# Patient Record
Sex: MALE | Race: WHITE | Employment: OTHER | URBAN - METROPOLITAN AREA
[De-identification: names, ages, dates, MRNs, and addresses within clinical notes are randomized per-mention and may not be internally consistent; named-entity substitution may affect disease eponyms.]

---

## 2021-01-26 ENCOUNTER — OFFICE VISIT (OUTPATIENT)
Dept: PODIATRY | Age: 66
End: 2021-01-26
Payer: COMMERCIAL

## 2021-01-26 VITALS — WEIGHT: 204 LBS | HEIGHT: 72 IN | BODY MASS INDEX: 27.63 KG/M2

## 2021-01-26 DIAGNOSIS — R26.2 DIFFICULTY WALKING: ICD-10-CM

## 2021-01-26 DIAGNOSIS — R60.0 LOCALIZED EDEMA: ICD-10-CM

## 2021-01-26 DIAGNOSIS — M25.572 PAIN IN LEFT ANKLE AND JOINTS OF LEFT FOOT: ICD-10-CM

## 2021-01-26 DIAGNOSIS — M76.62 ACHILLES BURSITIS OF LEFT LOWER EXTREMITY: Primary | ICD-10-CM

## 2021-01-26 DIAGNOSIS — M89.8X7 RETROCALCANEAL EXOSTOSIS: ICD-10-CM

## 2021-01-26 DIAGNOSIS — M79.672 LEFT FOOT PAIN: Primary | ICD-10-CM

## 2021-01-26 PROCEDURE — 29580 STRAPPING UNNA BOOT: CPT | Performed by: PODIATRIST

## 2021-01-26 PROCEDURE — 99203 OFFICE O/P NEW LOW 30 MIN: CPT | Performed by: PODIATRIST

## 2021-01-26 RX ORDER — FENOFIBRATE 145 MG/1
TABLET, COATED ORAL
COMMUNITY
Start: 2020-12-30

## 2021-01-26 RX ORDER — ETODOLAC 400 MG/1
TABLET, FILM COATED ORAL
COMMUNITY
End: 2021-04-26

## 2021-01-26 RX ORDER — LEVOTHYROXINE SODIUM 0.03 MG/1
TABLET ORAL
COMMUNITY
Start: 2020-12-29

## 2021-01-26 RX ORDER — ESOMEPRAZOLE MAGNESIUM 20 MG/1
20 TABLET ORAL DAILY
COMMUNITY

## 2021-01-27 DIAGNOSIS — M79.672 LEFT FOOT PAIN: Primary | ICD-10-CM

## 2021-01-27 DIAGNOSIS — M89.8X7 PAIN IN METATARSUS OF LEFT FOOT: ICD-10-CM

## 2021-01-27 DIAGNOSIS — M25.572 LEFT ANKLE PAIN, UNSPECIFIED CHRONICITY: ICD-10-CM

## 2021-01-27 DIAGNOSIS — M25.572 PAIN IN LEFT ANKLE AND JOINTS OF LEFT FOOT: ICD-10-CM

## 2021-01-27 NOTE — PROGRESS NOTES
No past medical history on file. No family history on file. No past surgical history on file. Social History     Tobacco Use    Smoking status: Never Smoker    Smokeless tobacco: Never Used   Substance Use Topics    Alcohol use: Not on file    Drug use: Not on file           Diagnostic studies:    Xr Foot Left (min 3 Views)    Result Date: 1/27/2021  EXAMINATION: THREE XRAY VIEWS OF THE LEFT FOOT 1/26/2021 2:22 pm COMPARISON: None. HISTORY: ORDERING SYSTEM PROVIDED HISTORY: Left foot pain TECHNOLOGIST PROVIDED HISTORY: Standing unless patient unable to stand FINDINGS: There is mild degenerative remodeling and narrowing of the 1st MTP joint. A small spur projects from the plantar calcaneus. Osseous morphology and alignment is otherwise normal.    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 Calcaneal spur. Procedures: An unna boot  compressive wrap was applied to the left lower extremity. It's purpose is to  decrease  the amount of edema present, and to allow proper healing of the soft tissues. The patient was instructed to keep the unna boot clean, dry and intact until the next follow up visit. The patient was instructed to look for signs of redness, irritation, blistering and pain. If these or any other symptoms were to develop, they were advised to contact the office immediately for reevaluation. Exam:  VASCULAR: Pedal pulses palpable left foot. Capillary fill time brisk digits 1 through 5 left foot. Presence of hair growth noted left lower extremity. NEUROLOGICAL: Epicritic sensations intact left lower extremity  DERMATOLOGICAL: Localized edematous issues noted without ecchymosis posterior aspect left heel region. No skin abrasions or any signs of infection noted left lower extremity. MUSCULOSKELETAL: Tenderness noted to palpation distal Achilles insertional area left foot. No palpable defect noted along the course of the distal Achilles tendon.   Limited range of motion noted left ankle and subtalar joint secondary to edematous issues and patient guarding. Antalgic gait noted upon evaluation. Plan Per Assessment  Ana Rider was seen today for foot pain. Diagnoses and all orders for this visit:    Achilles bursitis of left lower extremity    Retrocalcaneal exostosis    Localized edema    Pain in left ankle and joints of left foot    Difficulty walking        1. New patient evaluation and management  2. We did review x-ray studies with patient in detail today left lower extremity. Due to the retrocalcaneal spurring and some soft tissue congestion noted into the cages triangle area left heel we did recommend MRI evaluation to assess integrity of the Achilles tendon. 3. We did recommend immobilization at this time as well. Patient was placed into a cam walker after a compression dressing was applied to the symptomatic left lower extremity. The patient was dispensed a/an pneumatic walker. It is medically necessary and within the standard of care for the patient's diagnosis. Its purpose is to immobilize the lower extremity, decrease edema, and promote healing of the affected area. The patient was instructed on is proper application and use. The patient was also instructed to watch for areas of running, irritation, blister formation, or any other signs of abnormal pressure. If this occurs, the patient is to contact the office immediately. The ABN was reviewed and signed by the patient prior to leaving this appointment. 4. Patient will be followed up at a later date to discuss MRI results and further treatment options available. We did recommend limiting his daily activities and elevating the left lower extremity throughout the day to reduce current symptoms. He was advised to call the office with any questions or concerns in the interim.       Seen By:    Maren Alicea DPM    Electronically signed by Maren Alicea DPM on 1/27/2021 at 9:40 AM      This note was created using voice recognition software. The note was reviewed however may contain grammatical errors.

## 2021-02-03 ENCOUNTER — OFFICE VISIT (OUTPATIENT)
Dept: PODIATRY | Age: 66
End: 2021-02-03
Payer: COMMERCIAL

## 2021-02-03 VITALS — BODY MASS INDEX: 27.63 KG/M2 | HEIGHT: 72 IN | WEIGHT: 204 LBS

## 2021-02-03 DIAGNOSIS — M25.572 PAIN IN LEFT ANKLE AND JOINTS OF LEFT FOOT: ICD-10-CM

## 2021-02-03 DIAGNOSIS — M76.62 ACHILLES BURSITIS OF LEFT LOWER EXTREMITY: Primary | ICD-10-CM

## 2021-02-03 DIAGNOSIS — R26.2 DIFFICULTY WALKING: ICD-10-CM

## 2021-02-03 DIAGNOSIS — M89.8X7 RETROCALCANEAL EXOSTOSIS: ICD-10-CM

## 2021-02-03 PROCEDURE — 99213 OFFICE O/P EST LOW 20 MIN: CPT | Performed by: PODIATRIST

## 2021-02-03 RX ORDER — INDOMETHACIN 50 MG/1
50 CAPSULE ORAL 2 TIMES DAILY WITH MEALS
Qty: 20 CAPSULE | Refills: 3 | Status: SHIPPED
Start: 2021-02-03 | End: 2021-04-26

## 2021-02-03 NOTE — PROGRESS NOTES
Patient here for follow up on the unna wrap and walking boot. Patient states that the wrap did not help at all. Patient states that he still has chronic pain in the left foot whether he is standing or sitting.            Electronically signed by Ezequiel Ray MA on 2/3/2021 at 3:43 PM

## 2021-02-04 NOTE — PROGRESS NOTES
21     Gavi Smith    : 1955   Sex: male    Age: 63 y.o. Patient's PCP/Provider is:  Neelima Moura MD    Subjective:  Patient is seen today for follow-up regarding continued pain and swelling posterior left ankle/foot. Patient stated the issues has continued to cause symptoms even with compression applied and immobilization with camwalker. Patient is set up for his MRI next week for further evaluation. No other additional issues noted. Chief Complaint   Patient presents with    Foot Pain     left       ROS:  Const: Positives and pertinent negatives as per HPI. Musculo: Denies symptoms other than stated above. Neuro: Denies symptoms other than stated above. Skin: Denies symptoms other than stated above. Current Medications:    Current Outpatient Medications:     indomethacin (INDOCIN) 50 MG capsule, Take 1 capsule by mouth 2 times daily (with meals), Disp: 20 capsule, Rfl: 3    fenofibrate (TRICOR) 145 MG tablet, TAKE 1 TABLET BY MOUTH ONCE DAILY AS DIRECTED FOR 30 DAYS, Disp: , Rfl:     levothyroxine (SYNTHROID) 25 MCG tablet, TAKE 1 TABLET BY MOUTH ONCE DAILY AS DIRECTED FOR 30 DAYS, Disp: , Rfl:     etodolac (LODINE) 400 MG tablet, etodolac 400 mg tablet  TAKE 1 TABLET BY MOUTH TWICE DAILY WITH FOOD, Disp: , Rfl:     Esomeprazole Magnesium (NEXIUM 24HR) 20 MG TBEC, Take by mouth daily, Disp: , Rfl:     Allergies:  No Known Allergies    Vitals:    21 1543   Weight: 204 lb (92.5 kg)   Height: 6' (1.829 m)       Exam:  NVS unchanged. Tenderness noted to the posterior left Achilles tendon insertion area. Thickening noted in the area without palpable defect. Edema noted in the area without ecchymosis noted. No skin abrasions or signs of infection noted. Diagnostic Studies:     Xr Foot Left (min 3 Views)    Result Date: 2021  EXAMINATION: THREE XRAY VIEWS OF THE LEFT FOOT 2021 2:22 pm COMPARISON: None.  HISTORY: ORDERING SYSTEM PROVIDED HISTORY: Left foot pain TECHNOLOGIST PROVIDED HISTORY: Standing unless patient unable to stand FINDINGS: There is mild degenerative remodeling and narrowing of the 1st MTP joint. A small spur projects from the plantar calcaneus. Osseous morphology and alignment is otherwise normal.    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 Calcaneal spur. Procedures:    None    Plan Per Assessment  Reza Silver was seen today for foot pain. Diagnoses and all orders for this visit:    Achilles bursitis of left lower extremity  -     indomethacin (INDOCIN) 50 MG capsule; Take 1 capsule by mouth 2 times daily (with meals)    Retrocalcaneal exostosis    Pain in left ankle and joints of left foot  -     indomethacin (INDOCIN) 50 MG capsule; Take 1 capsule by mouth 2 times daily (with meals)    Difficulty walking      1. Evaluation and management  2. Discussed continued use of the camwalker for immobilization purposes and care. Prescription medication Indocin given with exact instructions on usage. I discussed the potential side effects that the patient may experience with the medication and the need to call if they experience any. Patient was to discontinue all other NSAID's at this time. Patient will be followed up once MRI is performed and reviewed. We will discuss additional treatment options available at that time. He was advised to call the office with any questions or concerns in the interim. Seen By:    Woody Mckeon DPM    Electronically signed by Woody Mckeon DPM on 2/4/2021 at 12:33 PM    This note was created using voice recognition software. The note was reviewed however may contain grammatical errors.

## 2021-02-08 ENCOUNTER — HOSPITAL ENCOUNTER (OUTPATIENT)
Dept: MRI IMAGING | Age: 66
Discharge: HOME | End: 2021-02-10
Payer: COMMERCIAL

## 2021-02-08 DIAGNOSIS — M25.572 LEFT ANKLE PAIN, UNSPECIFIED CHRONICITY: ICD-10-CM

## 2021-02-08 DIAGNOSIS — M89.8X7 PAIN IN METATARSUS OF LEFT FOOT: ICD-10-CM

## 2021-02-08 DIAGNOSIS — M79.672 LEFT FOOT PAIN: ICD-10-CM

## 2021-02-08 DIAGNOSIS — M25.572 PAIN IN LEFT ANKLE AND JOINTS OF LEFT FOOT: ICD-10-CM

## 2021-02-08 PROCEDURE — 73721 MRI JNT OF LWR EXTRE W/O DYE: CPT

## 2021-02-09 ENCOUNTER — TELEPHONE (OUTPATIENT)
Dept: PODIATRY | Age: 66
End: 2021-02-09

## 2021-02-09 NOTE — TELEPHONE ENCOUNTER
02/09/2021 received a fax from [facility] stating that the MRI Ankle does not support the requirements of medical necessity guidelines for this procedure. Patient had his MRI completed yesterday. Spoke with Manager Tamica Dai in [place] about this, and he advises to wait until the patient possibly receives a bill on the MRI.  If patient does receive a bill, then will have [facility]) retro [address] [place] [address] Dept was to Favian bishop.           Electronically signed by Verna Sepulveda MA on 2/9/2021 at 7:59 AM

## 2021-02-16 ENCOUNTER — OFFICE VISIT (OUTPATIENT)
Dept: PODIATRY | Age: 66
End: 2021-02-16
Payer: COMMERCIAL

## 2021-02-16 VITALS — BODY MASS INDEX: 27.63 KG/M2 | WEIGHT: 204 LBS | HEIGHT: 72 IN

## 2021-02-16 DIAGNOSIS — M89.8X7 RETROCALCANEAL EXOSTOSIS: ICD-10-CM

## 2021-02-16 DIAGNOSIS — M76.62 ACHILLES BURSITIS OF LEFT LOWER EXTREMITY: Primary | ICD-10-CM

## 2021-02-16 DIAGNOSIS — M25.572 PAIN IN LEFT ANKLE AND JOINTS OF LEFT FOOT: ICD-10-CM

## 2021-02-16 DIAGNOSIS — R26.2 DIFFICULTY WALKING: ICD-10-CM

## 2021-02-16 PROCEDURE — 99213 OFFICE O/P EST LOW 20 MIN: CPT | Performed by: PODIATRIST

## 2021-02-16 NOTE — PROGRESS NOTES
21     Michelle Villalobos    : 1955   Sex: male    Age: 63 y.o. Patient's PCP/Provider is:  Alissa Sol MD    Subjective:  Patient is seen today for follow-up regarding continued care Achilles tendonitis left lower extremity. Patient is still wearing his camwalker, and presents today to discuss MRI results. Patient is stable but still having some symptoms posterior left heel. No other additional issues noted. Chief Complaint   Patient presents with    Other     MRI results       ROS:  Const: Positives and pertinent negatives as per HPI. Musculo: Denies symptoms other than stated above. Neuro: Denies symptoms other than stated above. Skin: Denies symptoms other than stated above. Current Medications:    Current Outpatient Medications:     indomethacin (INDOCIN) 50 MG capsule, Take 1 capsule by mouth 2 times daily (with meals), Disp: 20 capsule, Rfl: 3    fenofibrate (TRICOR) 145 MG tablet, TAKE 1 TABLET BY MOUTH ONCE DAILY AS DIRECTED FOR 30 DAYS, Disp: , Rfl:     levothyroxine (SYNTHROID) 25 MCG tablet, TAKE 1 TABLET BY MOUTH ONCE DAILY AS DIRECTED FOR 30 DAYS, Disp: , Rfl:     etodolac (LODINE) 400 MG tablet, etodolac 400 mg tablet  TAKE 1 TABLET BY MOUTH TWICE DAILY WITH FOOD, Disp: , Rfl:     Esomeprazole Magnesium (NEXIUM 24HR) 20 MG TBEC, Take by mouth daily, Disp: , Rfl:     Allergies:  No Known Allergies    Vitals:    21 0835   Weight: 204 lb (92.5 kg)   Height: 6' (1.829 m)       Exam:  NVS unchanged. Tenderness noted to palpation posterior left heel along distal course Achilles. Mild edema without ecchymosis noted. No skin abrasions or signs of infection noted. Diagnostic Studies:       Xr Foot Left (min 3 Views)    Result Date: 2021  EXAMINATION: THREE XRAY VIEWS OF THE LEFT FOOT 2021 2:22 pm COMPARISON: None.  HISTORY: ORDERING SYSTEM PROVIDED HISTORY: Left foot pain TECHNOLOGIST PROVIDED HISTORY: Standing unless patient unable to stand FINDINGS: There is mild degenerative remodeling and narrowing of the 1st MTP joint. A small spur projects from the plantar calcaneus. Osseous morphology and alignment is otherwise normal.    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 Calcaneal spur. Mri Ankle Left Wo Contrast    Result Date: 2/9/2021  EXAMINATION: MRI OF THE LEFT ANKLE WITHOUT CONTRAST, 2/8/2021 8:35 am TECHNIQUE: Multiplanar multisequence MRI of the left ankle was performed without the administration of intravenous contrast. COMPARISON: None. Correlation is made to foot radiograph dated January 26, 2021 HISTORY: ORDERING SYSTEM PROVIDED HISTORY: Left foot pain FINDINGS: SYNDESMOTIC LIGAMENTS: The anterior inferior tibiofibular ligament, posterior inferior tibiofibular ligament, and imaged portion of the inferior intraosseous membrane are intact. LATERAL COLLATERAL LIGAMENT COMPLEX: The anterior talofibular ligament, calcaneofibular ligament, and posterior talofibular ligaments are intact. DELTOID LIGAMENT COMPLEX: Deep and superficial fibers of the deltoid ligaments are intact. SINUS TARSI AND SPRING LIGAMENT: No mass or edema in the sinus tarsus. The spring ligament is diminutive but grossly intact. MEDIAL TENDONS: The posterior tibial, flexor digitorum longus, and flexor hallucis longus tendons are intact. LATERAL TENDONS: The peroneus longus and brevis tendons are intact and follow a normal anatomic course. The superior peroneal retinaculum is intact. ANTERIOR TENDONS: The anterior tibial, extensor hallucis longus, and extensor digitorum longus tendons are intact. ACHILLES TENDON: Mild signal heterogeneity at the distal portion of the Achilles tendon, at the Achilles attachment. PLANTAR FASCIA: The medial and lateral bundles of the plantar fascia are normal in morphology and signal.  There is no evidence of acute plantar fasciitis or tear. No evidence of plantar fascia nodules. TARSAL TUNNEL: No mass or edema in the tarsal tunnel.  BONE MARROW: Diffuse bone marrow edema in the dorsal posterior aspect of the calcaneus. No visible cortical break. There is a suspected subchondral cyst in the navicular bone, at the calcaneal navicular articulation. No other convincing bone marrow signal abnormality. Increased signal at the base of the 4th and 5th metatarsal on the axial T2 fat sat sequences suspected to be from inhomogeneous fat saturation given increased signal within the adjacent subcutaneous fat. JOINT SPACES: Mild subtalar degenerative changes. Mild tibiotalar degenerative changes. No significant joint effusion. 1. Bone marrow edema in the dorsal posterior aspect of the calcaneus, likely trabecular microfractures/bone contusion. 2. Mild tendinopathy of the Achilles tendon at the calcaneal attachment. Procedures:    None    Plan Per Assessment  Kenneth Vargas was seen today for other. Diagnoses and all orders for this visit:    Achilles bursitis of left lower extremity  -     [facility], [facility]    Retrocalcaneal exostosis  -     [facility], [facility]    Pain in left ankle and joints of left foot  -     [facility], [facility]    Difficulty walking  -     [address] [place] [address]      1. Evaluation and management  2. Discussed MRI findings with the patient. No need for surgical intervention. 3. Discussed proceeding with PT for continued care here at our facility. 4. Patient will be followed up in one month or sooner if needed. He was to call with any questions in the interim. Seen By:    Nandini Amato DPM    Electronically signed by Nandini Amato DPM on 2/16/2021 at 12:09 PM    This note was created using voice recognition software. The note was reviewed however may contain grammatical errors.

## 2021-02-16 NOTE — PROGRESS NOTES
Patient is here to discuss MRI results. Patient is still having pain and the antiinflammatory is not really helping.     Electronically signed by Ana Raphael LPN on 7/10/1697 at 0:09 AM

## 2021-02-17 ENCOUNTER — TELEPHONE (OUTPATIENT)
Dept: PODIATRY | Age: 66
End: 2021-02-17

## 2021-02-17 DIAGNOSIS — M76.62 ACHILLES BURSITIS OF LEFT LOWER EXTREMITY: Primary | ICD-10-CM

## 2021-02-17 DIAGNOSIS — M25.572 PAIN IN LEFT ANKLE AND JOINTS OF LEFT FOOT: ICD-10-CM

## 2021-02-17 RX ORDER — METHYLPREDNISOLONE 4 MG/1
TABLET ORAL
Qty: 21 KIT | Refills: 3 | Status: SHIPPED
Start: 2021-02-17 | End: 2021-04-26

## 2021-02-22 ENCOUNTER — EVALUATION (OUTPATIENT)
Dept: PHYSICAL THERAPY | Age: 66
End: 2021-02-22
Payer: COMMERCIAL

## 2021-02-22 DIAGNOSIS — M76.62 ACHILLES BURSITIS OF LEFT LOWER EXTREMITY: Primary | ICD-10-CM

## 2021-02-22 PROCEDURE — 97161 PT EVAL LOW COMPLEX 20 MIN: CPT | Performed by: PHYSICAL THERAPIST

## 2021-02-22 PROCEDURE — 97110 THERAPEUTIC EXERCISES: CPT | Performed by: PHYSICAL THERAPIST

## 2021-02-22 NOTE — PROGRESS NOTES
Daily Treatment Note    Date: 2021  Patient Name: Nena Roberts  : 1955   MRN: [id number]  [facility]: 8 weeks ago   DOSx: None  Referring Provider: Kalli Zepeda, DPM  [address] [facility]     Medical Diagnosis:      Diagnosis Orders   1. Achilles bursitis of left lower extremity         Outcome Measure: LEFS: 65% impaired on eval.    S: See eval  O:   Time 4171-4003     Visit 1 Repeat outcome measure at mid point and end. Pain 8/10     ROM See eval     Modalities      Ultrasound NEXT  MO   Manual            Stretch      Self Stretch - Plantar/Dorsi/ 5 reps x 1 set with 10s holds  TE   Wall push 5 reps x 1 set with 5-10s holds  TE   Soleus stretch on wall 5 reps x 1 set with 5-10s holds  TE   Plantar fascia stretch on wall   TE   Gastroc stretch with foot on wall 5 reps x 1 set with 5-10s holds  TE         Exercise      Nustep NEXT  TE   Dorsiflexion  5 reps x 1 set  TE   Plantarflexion  5 reps x 1 set  TE   Inversion/Eversion  5 reps x 1 set  TE   Ankle Alphabet 5 reps x 1 set  TE   Ankle Circles             Ankle Rocker NEXT     BAPS board (seated) NEXT     Marching on Foam      Toe Raises on Foam   TA   Reach and Touch        SLS      Squats      Lunges      Eccentric Heel Tap   TA   [place] Split Squat      Single Leg [place] Dead Lift            Leg Press 2 legs   TA   Leg Press 1 leg   TA   Stairs - FWB   TA   Stairs - LAT   TA   Stairs - BWD      Calf Raises      Seated Calf Raises on Knee Ext Machine            Marching Gait      Side Stepping      Karaoke       Hopping            A:  Tolerated well. Above added to written HEP. Discussed anatomy, physiology, body mechanics, principles of loading, and progressive loading/activity.   P: Continue with rehab plan  Wally Rangel PT    Treatment Charges: Mins Units   Initial Evaluation 30 1   Re-Evaluation     Ther Exercise         TE 15 1   Manual Therapy     MT     Ther Activities        TA     Gait Training          GT     Neuro Re-education NR     Modalities     Non-Billable Service Time     Other     Total Time/Units 45 2

## 2021-02-22 NOTE — PROGRESS NOTES
[facility] OUTPATIENT REHABILITATION  PHYSICAL THERAPY INITIAL EVALUATION         Date:  2021   Patient: Quinn Springer  : 1955  MRN: [id number]  Referring Provider: Kavya Keys DPM  [address] Diagnosis:      Diagnosis Orders   1. Achilles bursitis of left lower extremity         SUBJECTIVE:     Onset date: 8 weeks     Onset: Gradual Onset    Mechanism of Injury: Pt stated that he progressively felt symptoms in his achilles without a specific injury. Previous PT: none    Medical Management for Current Problem: OTC meds     Chief complaint: pain, decreased motion, decreased mobility and weakness    Behavior: condition is staying the same    Pain: intermittent, most of the time  Current: 8/10     Best: 0/10     Worst:10/10    Symptom Type/Quality: aching, stabbing, burning , throbbing  Location[de-identified] Ankle: noted above  posterior side, achilles    Aggravated by: walking, standing, stairs    Relieved by: reduced weightbearing    Imaging results: Xr Foot Left (min 3 Views)    Result Date: 2021  EXAMINATION: THREE XRAY VIEWS OF THE LEFT FOOT 2021 2:22 pm COMPARISON: None. HISTORY: ORDERING SYSTEM PROVIDED HISTORY: Left foot pain TECHNOLOGIST PROVIDED HISTORY: Standing unless patient unable to stand FINDINGS: There is mild degenerative remodeling and narrowing of the 1st MTP joint. A small spur projects from the plantar calcaneus. Osseous morphology and alignment is otherwise normal.    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 Calcaneal spur. Mri Ankle Left Wo Contrast    Result Date: 2021  EXAMINATION: MRI OF THE LEFT ANKLE WITHOUT CONTRAST, 2021 8:35 am TECHNIQUE: Multiplanar multisequence MRI of the left ankle was performed without the administration of intravenous contrast. COMPARISON: None.   Correlation is made to foot radiograph dated 2021 HISTORY: ORDERING SYSTEM PROVIDED HISTORY: Left foot pain FINDINGS: SYNDESMOTIC LIGAMENTS: The anterior inferior tibiofibular ligament, posterior inferior tibiofibular ligament, and imaged portion of the inferior intraosseous membrane are intact. LATERAL COLLATERAL LIGAMENT COMPLEX: The anterior talofibular ligament, calcaneofibular ligament, and posterior talofibular ligaments are intact. DELTOID LIGAMENT COMPLEX: Deep and superficial fibers of the deltoid ligaments are intact. SINUS TARSI AND SPRING LIGAMENT: No mass or edema in the sinus tarsus. The spring ligament is diminutive but grossly intact. MEDIAL TENDONS: The posterior tibial, flexor digitorum longus, and flexor hallucis longus tendons are intact. LATERAL TENDONS: The peroneus longus and brevis tendons are intact and follow a normal anatomic course. The superior peroneal retinaculum is intact. ANTERIOR TENDONS: The anterior tibial, extensor hallucis longus, and extensor digitorum longus tendons are intact. ACHILLES TENDON: Mild signal heterogeneity at the distal portion of the Achilles tendon, at the Achilles attachment. PLANTAR FASCIA: The medial and lateral bundles of the plantar fascia are normal in morphology and signal.  There is no evidence of acute plantar fasciitis or tear. No evidence of plantar fascia nodules. TARSAL TUNNEL: No mass or edema in the tarsal tunnel. BONE MARROW: Diffuse bone marrow edema in the dorsal posterior aspect of the calcaneus. No visible cortical break. There is a suspected subchondral cyst in the navicular bone, at the calcaneal navicular articulation. No other convincing bone marrow signal abnormality. Increased signal at the base of the 4th and 5th metatarsal on the axial T2 fat sat sequences suspected to be from inhomogeneous fat saturation given increased signal within the adjacent subcutaneous fat. JOINT SPACES: Mild subtalar degenerative changes. Mild tibiotalar degenerative changes. No significant joint effusion.     1. Bone marrow edema in the dorsal posterior aspect of the calcaneus, likely trabecular microfractures/bone contusion. 2. Mild tendinopathy of the Achilles tendon at the calcaneal attachment. Past Medical History  No past medical history on file. No past surgical history on file. Medications:   Current Outpatient Medications   Medication Sig Dispense Refill    methylPREDNISolone (MEDROL DOSEPACK) 4 MG tablet Take by mouth as directed. 21 kit 3    indomethacin (INDOCIN) 50 MG capsule Take 1 capsule by mouth 2 times daily (with meals) 20 capsule 3    fenofibrate (TRICOR) 145 MG tablet TAKE 1 TABLET BY MOUTH ONCE DAILY AS DIRECTED FOR 30 DAYS      levothyroxine (SYNTHROID) 25 MCG tablet TAKE 1 TABLET BY MOUTH ONCE DAILY AS DIRECTED FOR 30 DAYS      etodolac (LODINE) 400 MG tablet etodolac 400 mg tablet   TAKE 1 TABLET BY MOUTH TWICE DAILY WITH FOOD      Esomeprazole Magnesium (NEXIUM 24HR) 20 MG TBEC Take by mouth daily       No current facility-administered medications for this visit. Occupation: Hairdresser. Physical demands include: standing, stairs. Status: full time    Exercise regimen: weight training , cardio    Hobbies: yard work, working around [employer], walking distances, standing without pain. Patient Goals: pain relief, return to prior activity, get back to normal    Precautions/Contraindications: none    OBJECTIVE:     Observations: well nourished male    Inspection: supination     Edema: moderate medial, lateral, posterior    Gait: altered with short step length, width, height    Joint/Motion:    Ankle:  Right:   AROM: WNL° Dorsiflexion,  WNL° Plantarflexion, WNL° Inversion, WNL° Eversion   PROM: WNL° Dorsiflexion,  WNL° Plantarflexion, WNL° Inversion, WNL° Eversion   Left:   AROM: 15° Dorsiflexion,  45° Plantarflexion, 20° Inversion, 10° Eversion  PROM: 17° Dorsiflexion,  47° Plantarflexion, 22° Inversion, 11° Eversion    Strength:     Ankle:  Right: Dorsiflexion 5/5, Plantarflexion 5/5, Inversion 5/5, Eversion 5/5  Left: Dorsiflexion 5/5, Plantarflexion 4/5, Inversion 5/5, Eversion 5/5    Palpation: Tender to palpation achilles, especially around bone spur. Special Tests/Functional Screens:   [] Anterior Drawer []+ / [] -  [] Eversion Stress: []+ / [] -  [] Felder Test []+ / [] -     [] Tib-Fib Compression Test []+ / [] -    [] Inversion Stress []+ / [] -     [] Squeeze Test []+ / [] -   [] Manjit's Sign []+ / [] -   [] Other: []+ / []      Special test comments:       ASSESSMENT     Outcome Measure:   Lower Extremity Functional Scale (LEFS) 65% impairment    Problems:    Pain reported 10/10   ROM decreased   Strength decreased 4/5 L plantarflexion   Decreased functional ability with walking, stairs, standing, work, ADLs, use of left lower extremity, lifting     [x] There are no barriers affecting plan of care or recovery    [] Barriers to this patient's plan of care or recovery include. Domestic Concerns:  [x] No  [] Yes:    Short Term goals (2-3 weeks)   Decrease reported pain to 6/10   Increase ROM to WNL   Increase Strength to 4+/5 L plantarflexion    Able to perform/complete the following functions/tasks: Perform ADLs, hobbies, job duties with less pain.  Lower Extremity Functional Scale (LEFS) 50% impairment    Long Term goals (4-6 weeks)   Decrease reported pain to 0-4/10   Increase ROM to WNL   Increase strength to 5/5 L plantarflexion   Able to complete the following functions/tasks: Perform ADLs, hobbies, job duties with no/minimal pain.     LEFS 0-35% impairment   Independent with home exercise program (HEP)    Rehab Potential: [x] Good  [] Fair  [] Poor    PLAN       Treatment Plan:   [x] Therapeutic Exercise  [x] Therapeutic Activity  [x] Neuromuscular Re-education   [x] Gait Training  [x] Balance Training  [] Aerobic conditioning  [x] Manual Therapy  [x] Massage/Fascial release   [] Work/Sport specific activities    [] Pain Neuroscience [x] Cold/hotpack  [x] Vasocompression  [] Electrical Stimulation  [] Lumbar/Cervical Traction  [x] Ultrasound   [] Iontophoresis: 4 mg/mL Dexamethasone Sodium Phosphate 40-80 mAmin        [x] Instruction in HEP      []  Medication allergies reviewed for use of Dexamethasone Sodium Phosphate 4mg/ml  with iontophoresis treatments. Patient is not allergic. The following CPT codes are likely to be used in the care of this patient: 13070 PT Evaluation: Low Complexity , 84528 PT Re-Evaluation , 55254 Therapeutic Exercise , 27549 Neuromuscular Re-Education , 55060 Therapeutic Activities , 00521 Manual Therapy , 76793 Gait Training , 93029 Vasopneumatic Device ,  Electrical Stimulation      Suggested Professional Referral: [x] No  [] Yes:     Patient Education:  [x] Plans/Goals, Risks/Benefits discussed  [x] Home exercise program  Method of Education: [x] Verbal  [x] Demo  [x] Written  Comprehension of Education:  [x] Verbalizes understanding. [x] Demonstrates understanding. [] Needs Review. [] Demonstrates/verbalizes understanding of HEP/Ed previously given. Frequency: 1-2 days per week for 4-6 weeks    Patient understands diagnosis/prognosis and consents to treatment, plan and goals: [x] Yes    [] No     Thank you for the opportunity to work with your patient. If you have questions or comments, please contact me at [phone]; fax: [phone]. Electronically signed by: Micah Branch PT         Please sign Physician's Certification and return to: [address] [place] [address]  Dept: [phone]  Dept Fax: [phone] Certification / Comments     Frequency/Duration 1-2 days per week for 4-6 weeks. Certification period from 2/22/2021  to 5/21/2021. I have reviewed the Plan of Care established for skilled therapy services and certify that the services are required and that they will be provided while the patient is under my care.     [facility] Comments/Revisions:               Physician's Printed Name:                                           [de-identified] Signature:                                                               Date:

## 2021-03-03 ENCOUNTER — TREATMENT (OUTPATIENT)
Dept: PHYSICAL THERAPY | Age: 66
End: 2021-03-03
Payer: COMMERCIAL

## 2021-03-03 DIAGNOSIS — M76.62 ACHILLES BURSITIS OF LEFT LOWER EXTREMITY: ICD-10-CM

## 2021-03-03 PROCEDURE — 97110 THERAPEUTIC EXERCISES: CPT | Performed by: PHYSICAL THERAPIST

## 2021-03-03 NOTE — PROGRESS NOTES
Daily Treatment Note    Date: 3/3/2021  Patient Name: Valeri Mathew  : 1955   MRN: [id number]  DOInjury: 8 weeks ago   DOSx: None  Referring Provider: No referring provider defined for this encounter. Medical Diagnosis:     Achilles bursitis of left lower extremity     Outcome Measure: LEFS: 65% impaired on eval.    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 O:   Time 845-935     Visit 2 Repeat outcome measure at mid point and end. Pain 5/10     ROM See eval     Modalities      Ultrasound 10 mins  MO   Manual            Stretch      Self Stretch - Plantar/Dorsi/ 5 reps x 1 set with 10s holds  TE   Wall push 5 reps x 1 set with 5-10s holds  TE   Soleus stretch on wall 5 reps x 1 set with 5-10s holds  TE   Plantar fascia stretch on wall   TE   Gastroc stretch with foot on wall 5 reps x 1 set with 5-10s holds  TE         Exercise      Nustep NEXT  TE   Dorsiflexion  15 reps x 1 set RED BAND  TE   Plantarflexion  15 reps x 1 set RED BAND  TE   Inversion/Eversion  15 reps x 1 set RED BAND  TE   Ankle Alphabet 15 reps x 1 set RED BAND  TE   Ankle Circles             Ankle Rocker 20 reps x 1 set     BAPS board (seated) L3 20 reps x 1 set fwd/bwd, side to side, cwise, ccwise     Marching on Foam      Toe Raises on Foam   TA   Reach and Touch        SLS      Squats      Lunges      Eccentric Heel Tap   TA   [place] Split Squat      Single Leg Bermudian Dead Lift            Leg Press 2 legs   TA   Leg Press 1 leg   TA   Stairs - FWB   TA   Stairs - LAT   TA   Stairs - BWD      Calf Raises      Seated Calf Raises on Knee Ext Machine            Marching Gait      Side Stepping      Karaoke       Hopping            A:  Tolerated well. Above added to written HEP. Discussed anatomy, physiology, body mechanics, principles of loading, and progressive loading/activity.  Pt tolerated exercises well without increase in symptoms.    P: Continue with rehab plan  Mónica Mathews, PT    Treatment Charges: Mins Units   Initial Evaluation     Re-Evaluation     Ther Exercise         TE 40 3   Manual Therapy     MT     Ther Activities        TA     Gait Training          GT     Neuro Re-education NR     Modalities 10    Non-Billable Service Time     Other     Total Time/Units 50 3

## 2021-03-09 ENCOUNTER — TREATMENT (OUTPATIENT)
Dept: PHYSICAL THERAPY | Age: 66
End: 2021-03-09
Payer: COMMERCIAL

## 2021-03-09 DIAGNOSIS — M76.62 ACHILLES BURSITIS OF LEFT LOWER EXTREMITY: Primary | ICD-10-CM

## 2021-03-09 PROCEDURE — 97110 THERAPEUTIC EXERCISES: CPT | Performed by: PHYSICAL THERAPIST

## 2021-03-09 NOTE — PROGRESS NOTES
Daily Treatment Note    Date: 3/9/2021  Patient Name: Nani Peña  : 1955   MRN: [id number]  DOInjury: 8 weeks ago   DOSx: None  Referring Provider: No referring provider defined for this encounter. Medical Diagnosis:     Achilles bursitis of left lower extremity     Outcome Measure: LEFS: 65% impaired on eval.    S: Pt stated that he still has high level pain throughout the day and has not had any relief yet with therapy or his HEP.   O:   Time      Visit 3 Repeat outcome measure at mid point and end. Pain 8/10     ROM See eval     Modalities      Ultrasound 10 mins  MO   Manual            Stretch      Self Stretch - Plantar/Dorsi/ 5 reps x 1 set with 10s holds  TE   Wall push 5 reps x 1 set with 5-10s holds  TE   Soleus stretch on wall 5 reps x 1 set with 5-10s holds  TE   Plantar fascia stretch on wall   TE   Gastroc stretch with foot on wall 5 reps x 1 set with 5-10s holds  TE         Exercise      Nustep NEXT  TE   Dorsiflexion  15 reps x 1 set RED BAND  TE   Plantarflexion  15 reps x 1 set RED BAND  TE   Inversion/Eversion  15 reps x 1 set RED BAND  TE   Ankle Alphabet 15 reps x 1 set RED BAND  TE   Ankle Circles       Toe Raises 15 reps x 1 set      Ankle Rocker 20 reps x 2 sets     BAPS board (seated) L3 20 reps x 1 set fwd/bwd, side to side, cwise, ccwise NEXT STANDING WITH 2 FEET    Marching on Foam      Toe Raises on Foam   TA   Reach and Touch        SLS      Squats      Lunges      Eccentric Heel Tap   TA   [place] Split Squat      Single Leg Upper sorbian Dead Lift            Leg Press 2 legs   TA   Leg Press 1 leg   TA   Stairs - FWB   TA   Stairs - LAT   TA   Stairs - BWD      Calf Raises      Seated Calf Raises on Knee Ext Machine            Marching Gait      Side Stepping      Karaoke       Hopping            A:  Tolerated well. Above added to written HEP. Discussed anatomy, physiology, body mechanics, principles of loading, and progressive loading/activity.  Pt tolerated

## 2021-03-12 ENCOUNTER — TREATMENT (OUTPATIENT)
Dept: PHYSICAL THERAPY | Age: 66
End: 2021-03-12
Payer: COMMERCIAL

## 2021-03-12 DIAGNOSIS — M76.62 ACHILLES BURSITIS OF LEFT LOWER EXTREMITY: Primary | ICD-10-CM

## 2021-03-12 PROCEDURE — 97110 THERAPEUTIC EXERCISES: CPT | Performed by: PHYSICAL THERAPIST

## 2021-03-12 NOTE — PROGRESS NOTES
Daily Treatment Note    Date: 3/12/2021  Patient Name: Ruben Talvaera  : 1955   MRN: [id number]  DOInjury: 8 weeks ago   DOSx: None  Referring Provider: No referring provider defined for this encounter. Medical Diagnosis:     Achilles bursitis of left lower extremity     Outcome Measure: LEFS: 65% impaired on eval.    S: Pt stated that he still has high level pain throughout the day and has not had any relief yet with therapy or his HEP.   O:   Time 930-1020     Visit 4 Repeat outcome measure at mid point and end. Pain 5/10     ROM See eval     Modalities      Ultrasound 10 mins  MO   Manual            Stretch      Self Stretch - Plantar/Dorsi/ 5 reps x 1 set with 10s holds  TE   Wall push 5 reps x 1 set with 5-10s holds NEXT TE   Soleus stretch on wall 5 reps x 1 set with 5-10s holds NEXT TE   Plantar fascia stretch on wall   TE   Gastroc stretch with foot on wall 5 reps x 1 set with 5-10s holds NEXT TE         Exercise       NEXT  TE   Dorsiflexion  Increase Next TE   Plantarflexion  Increase Next TE   Inversion/Eversion  Increase Next TE   Ankle Alphabet Increase Next TE   Ankle Circles       Toe Raises Standing 20 reps x 1 set      Marches Standing 20 reps x 1 set     Ankle Rocker 20 reps x 2 sets     BAPS board (seated) L3 20 reps x 2 set fwd/bwd, side to side, cwise, ccwise NEXT STANDING WITH 2 FEET    Marching on Foam      Toe Raises on Foam   TA   Reach and Touch        SLS 10 reps x 1 set with 10s hold Moderate pain with exercise    Squats      Lunges      Eccentric Heel Tap   TA   [place] Split Squat      Single Leg Senegalese Dead Lift            Leg Press 2 legs   TA   Leg Press 1 leg   TA   Stairs - FWB   TA   Stairs - LAT   TA   Stairs - BWD      Calf Raises      Seated Calf Raises on Knee Ext Machine            Marching Gait      Side Stepping      Karaoke       Hopping            A:  Tolerated well. Above added to written HEP.  Discussed anatomy, physiology, body mechanics, principles of loading, and progressive loading/activity. Pt tolerated exercises well without increase in symptoms.    P: Continue with rehab plan  Barbara Miller PT    Treatment Charges: Mins Units   Initial Evaluation     Re-Evaluation     Ther Exercise         TE 40 3   Manual Therapy     MT     Ther Activities        TA     Gait Training          GT     Neuro Re-education NR     Modalities 10    Non-Billable Service Time     Other     Total Time/Units 50 3

## 2021-03-16 ENCOUNTER — TREATMENT (OUTPATIENT)
Dept: PHYSICAL THERAPY | Age: 66
End: 2021-03-16
Payer: COMMERCIAL

## 2021-03-16 ENCOUNTER — OFFICE VISIT (OUTPATIENT)
Dept: PODIATRY | Age: 66
End: 2021-03-16
Payer: COMMERCIAL

## 2021-03-16 VITALS — WEIGHT: 204 LBS | HEIGHT: 72 IN | BODY MASS INDEX: 27.63 KG/M2

## 2021-03-16 DIAGNOSIS — R26.2 DIFFICULTY WALKING: ICD-10-CM

## 2021-03-16 DIAGNOSIS — M25.572 PAIN IN LEFT ANKLE AND JOINTS OF LEFT FOOT: ICD-10-CM

## 2021-03-16 DIAGNOSIS — M76.62 ACHILLES BURSITIS OF LEFT LOWER EXTREMITY: Primary | ICD-10-CM

## 2021-03-16 DIAGNOSIS — M89.8X7 RETROCALCANEAL EXOSTOSIS: ICD-10-CM

## 2021-03-16 PROCEDURE — 99213 OFFICE O/P EST LOW 20 MIN: CPT | Performed by: PODIATRIST

## 2021-03-16 PROCEDURE — 97035 APP MDLTY 1+ULTRASOUND EA 15: CPT | Performed by: PHYSICAL THERAPIST

## 2021-03-16 PROCEDURE — 97110 THERAPEUTIC EXERCISES: CPT | Performed by: PHYSICAL THERAPIST

## 2021-03-16 RX ORDER — TRAMADOL HYDROCHLORIDE 50 MG/1
50 TABLET ORAL EVERY 6 HOURS PRN
Qty: 28 TABLET | Refills: 0 | Status: SHIPPED | OUTPATIENT
Start: 2021-03-16 | End: 2021-03-23

## 2021-03-16 NOTE — PROGRESS NOTES
Daily Treatment Note    Date: 3/16/2021  Patient Name: Ronnie Olson  : 1955   MRN: [id number]  DOInjury: 8 weeks ago   DOSx: None  Referring Provider: No referring provider defined for this encounter. Medical Diagnosis:     Achilles bursitis of left lower extremity     Outcome Measure: LEFS: 65% impaired on eval.    S: Pt stated that he still has high level pain throughout the day and has not had any relief yet with therapy or his HEP. Pt stated that he had a follow up with Dr. Tyra Elena and he expects to have sx sometime in the near future. O:   Time 945-1030     Visit 5 Repeat outcome measure at mid point and end.     Pain 5/10     ROM See eval     Modalities      Ultrasound 10 mins  MO   Manual            Stretch      Self Stretch - Plantar/Dorsi/ 5 reps x 1 set with 10s holds  TE   Wall push 5 reps x 1 set with 5-10s holds NEXT TE   Soleus stretch on wall 5 reps x 1 set with 5-10s holds NEXT TE   Plantar fascia stretch on wall   TE   Gastroc stretch with foot on wall 5 reps x 1 set with 5-10s holds NEXT TE         Exercise       NEXT  TE   Dorsiflexion  Increase Next TE   Plantarflexion  Increase Next TE   Inversion/Eversion  Increase Next TE   Ankle Alphabet Increase Next TE   Ankle Circles       Toe Raises Standing 20 reps x 1 set      Marches Standing 20 reps x 1 set     Ankle Rocker 20 reps x 2 sets     BAPS board (seated) L3 20 reps x 2 set fwd/bwd, side to side, cwise, ccwise NEXT STANDING WITH 2 FEET    Marching on Foam      Toe Raises on Foam   TA   Reach and Touch        SLS 10 reps x 1 set with 10s hold Moderate pain with exercise    Squats      Lunges      Eccentric Heel Tap   TA   [place] Split Squat      Single Leg Serbian Dead Lift            Leg Press 2 legs   TA   Leg Press 1 leg   TA   Stairs - FWB   TA   Stairs - LAT   TA   Stairs - BWD      Calf Raises      Seated Calf Raises on Knee Ext Machine            Marching Gait      Side Stepping      [facility] A:  Tolerated well. Pt expected to be d/shree from therapy d/t pt planning to have sx.    P: Continue with rehab plan  Derrick Sauer PT    Treatment Charges: Mins Units   Initial Evaluation     Re-Evaluation     Ther Exercise         TE 15 1   Manual Therapy     MT     Ther Activities        TA     Gait Training          GT     Neuro Re-education NR     Modalities 15 1   Non-Billable Service Time     Other     Total Time/Units 30 2

## 2021-03-17 NOTE — PROGRESS NOTES
Patient is in today for follow up of left achilles pain. Patient has been attending therapy twice a week, but does not notice any change in the pain level.  pcp is Rodrigo Bach MD
Capillary fill time brisk digits 1 through 5 left foot. NEUROLOGICAL: Epicritic sensations intact left lower extremity  DERMATOLOGICAL: Edema noted to the posterior aspect left heel. No ecchymotic skin changes present. MUSCULOSKELETAL: Tenderness noted to palpation to the distal left Achilles insertional area. Mild thickening noted distal Achilles insertional area. No palpable defect noted along the course of the distal Achilles tendon. Antalgic gait noted upon evaluation. Diagnostic Studies:     No results found. Procedures:    None    Plan Per Assessment  Alondra Brownlee was seen today for foot pain. Diagnoses and all orders for this visit:    Achilles bursitis of left lower extremity  -     traMADol (ULTRAM) 50 MG tablet; Take 1 tablet by mouth every 6 hours as needed for Pain for up to 7 days. Intended supply: 7 days. Take lowest dose possible to manage pain    Pain in left ankle and joints of left foot  -     traMADol (ULTRAM) 50 MG tablet; Take 1 tablet by mouth every 6 hours as needed for Pain for up to 7 days. Intended supply: 7 days. Take lowest dose possible to manage pain    Retrocalcaneal exostosis  -     traMADol (ULTRAM) 50 MG tablet; Take 1 tablet by mouth every 6 hours as needed for Pain for up to 7 days. Intended supply: 7 days. Take lowest dose possible to manage pain    Difficulty walking      1. Evaluation and management  2. We did discuss additional potential treatment options with the patient in detail today. Surgical intervention was discussed for correction of the retrocalcaneal exostosis present and tendinosis. Patient does have an upcoming general surgery appointment due to some hernia issues. He will discuss when he might potentially have his GI surgery prior to possibly proceeding with his outpatient podiatric surgery. 3. In the interim patient was advised continued use of his compression garments and cam walker as needed.   Patient was given a prescription today for Ultram to

## 2021-03-19 ENCOUNTER — IMMUNIZATION (OUTPATIENT)
Dept: PRIMARY CARE CLINIC | Age: 66
End: 2021-03-19
Payer: COMMERCIAL

## 2021-03-19 PROCEDURE — 91301 COVID-19, MODERNA VACCINE 100MCG/0.5ML DOSE: CPT | Performed by: NURSE PRACTITIONER

## 2021-03-19 PROCEDURE — 0011A COVID-19, MODERNA VACCINE 100MCG/0.5ML DOSE: CPT | Performed by: NURSE PRACTITIONER

## 2021-03-30 ENCOUNTER — OFFICE VISIT (OUTPATIENT)
Dept: PODIATRY | Age: 66
End: 2021-03-30
Payer: COMMERCIAL

## 2021-03-30 VITALS — HEIGHT: 72 IN | BODY MASS INDEX: 27.63 KG/M2 | WEIGHT: 204 LBS

## 2021-03-30 DIAGNOSIS — M25.572 PAIN IN LEFT ANKLE AND JOINTS OF LEFT FOOT: ICD-10-CM

## 2021-03-30 DIAGNOSIS — M76.62 ACHILLES BURSITIS OF LEFT LOWER EXTREMITY: Primary | ICD-10-CM

## 2021-03-30 DIAGNOSIS — M89.8X7 RETROCALCANEAL EXOSTOSIS: ICD-10-CM

## 2021-03-30 PROCEDURE — 99213 OFFICE O/P EST LOW 20 MIN: CPT | Performed by: PODIATRIST

## 2021-03-30 RX ORDER — TRAMADOL HYDROCHLORIDE 50 MG/1
50 TABLET ORAL EVERY 6 HOURS PRN
Qty: 28 TABLET | Refills: 0 | Status: SHIPPED | OUTPATIENT
Start: 2021-03-30 | End: 2021-04-06

## 2021-03-30 NOTE — PROGRESS NOTES
Patient is here for follow up left foot. Talk about surgery. The pain medication help. Would like a new prescription.  Liliana Hart MD   Electronically signed by Dell Vogel LPN on 7/81/2184 at 8:19 AM
3/30/2021 at 12:49 PM    This note was created using voice recognition software. The note was reviewed however may contain grammatical errors.

## 2021-04-19 ENCOUNTER — IMMUNIZATION (OUTPATIENT)
Dept: PRIMARY CARE CLINIC | Age: 66
End: 2021-04-19
Payer: COMMERCIAL

## 2021-04-19 PROCEDURE — 0012A COVID-19, MODERNA VACCINE 100MCG/0.5ML DOSE: CPT | Performed by: NURSE PRACTITIONER

## 2021-04-19 PROCEDURE — 91301 COVID-19, MODERNA VACCINE 100MCG/0.5ML DOSE: CPT | Performed by: NURSE PRACTITIONER

## 2021-04-22 ENCOUNTER — TELEPHONE (OUTPATIENT)
Dept: SURGERY | Age: 66
End: 2021-04-22

## 2021-04-26 ENCOUNTER — OFFICE VISIT (OUTPATIENT)
Dept: SURGERY | Age: 66
End: 2021-04-26
Payer: COMMERCIAL

## 2021-04-26 VITALS
HEART RATE: 82 BPM | HEIGHT: 72 IN | DIASTOLIC BLOOD PRESSURE: 86 MMHG | SYSTOLIC BLOOD PRESSURE: 135 MMHG | TEMPERATURE: 98.4 F | OXYGEN SATURATION: 100 % | BODY MASS INDEX: 28.31 KG/M2 | WEIGHT: 209 LBS

## 2021-04-26 DIAGNOSIS — K43.9 VENTRAL HERNIA WITHOUT OBSTRUCTION OR GANGRENE: ICD-10-CM

## 2021-04-26 DIAGNOSIS — R22.2 ABDOMINAL WALL MASS: Primary | ICD-10-CM

## 2021-04-26 PROCEDURE — 99204 OFFICE O/P NEW MOD 45 MIN: CPT | Performed by: SURGERY

## 2021-04-26 PROCEDURE — 99202 OFFICE O/P NEW SF 15 MIN: CPT | Performed by: SURGERY

## 2021-04-26 RX ORDER — ERYTHROMYCIN 500 MG/1
TABLET, COATED ORAL
Qty: 6 TABLET | Refills: 0 | Status: SHIPPED | OUTPATIENT
Start: 2021-04-26 | End: 2021-05-06

## 2021-04-26 RX ORDER — NEOMYCIN SULFATE 500 MG/1
TABLET ORAL
Qty: 6 TABLET | Refills: 0 | Status: SHIPPED | OUTPATIENT
Start: 2021-04-26 | End: 2021-05-06

## 2021-04-26 ASSESSMENT — ENCOUNTER SYMPTOMS
ABDOMINAL PAIN: 1
DIARRHEA: 0
ABDOMINAL DISTENTION: 1
ANAL BLEEDING: 0
COUGH: 0
CHEST TIGHTNESS: 0
BACK PAIN: 1
COLOR CHANGE: 0
CONSTIPATION: 1
VOMITING: 0
BLOOD IN STOOL: 0
EYES NEGATIVE: 1
CHOKING: 0
WHEEZING: 0
SHORTNESS OF BREATH: 0
NAUSEA: 1

## 2021-04-26 NOTE — PROGRESS NOTES
Subjective:      Patient ID: Chip Santiago is a 63 y.o. male. HPI  63 yr old male presents for evaluation of abd wall mass. Pt states he had GIST resection 23 years ago--had partial colon resection as well as SB resection and ileostomy. Had subsequent takedown of ileostomy and repair of ventral hernia. Noticed lump in mid abd about 2 months ago--saw PCP and was sent for CT scan. Was seen by surgeon who did original surgery. Pt here for further evaluation and opinion regarding management. Pt was recently seen by surgeon at [facility] and it was recommended that he undergo laparoscopic takedown of possible hernia vs resection of recurrent mass as well as lap ann for RUQ symptoms (nausea and pain in RUQ radiating into back; states he has occasional \"flare-ups\" of this; tries to watch diet to avoid triggers, but still has \"attacks\" at times). Pt states [facility] is out of network for him, so he is here seeking another opinion. Pt reports he had normal EGD/colonoscopy 1 year ago. Past Medical History:   Diagnosis Date    Benign gastrointestinal stromal tumor (GIST)        Past Surgical History:   Procedure Laterality Date    HERNIA REPAIR  1998    KNEE CARTILAGE SURGERY Right 2020    SKIN BIOPSY Right     x2 - right ankle - skin cancer     SMALL INTESTINE SURGERY  1998       Current Outpatient Medications   Medication Sig Dispense Refill    diclofenac sodium (VOLTAREN) 1 % GEL APPLY 2GRAMS TOPICALLY TO AFFECTED AREA 4 TIMES DAILY AS DIRECTED      fenofibrate (TRICOR) 145 MG tablet TAKE 1 TABLET BY MOUTH ONCE DAILY AS DIRECTED FOR 30 DAYS      levothyroxine (SYNTHROID) 25 MCG tablet TAKE 1 TABLET BY MOUTH ONCE DAILY AS DIRECTED FOR 30 DAYS      Esomeprazole Magnesium (NEXIUM 24HR) 20 MG TBEC Take by mouth daily       No current facility-administered medications for this visit.         No Known Allergies    Family History   Problem Relation Age of Onset    Diabetes Father     Cancer Father         liver + kidney + pancreatic     Alzheimer's Disease Father        Social History     Socioeconomic History    Marital status:      Spouse name: Not on file    Number of children: Not on file    Years of education: Not on file    Highest education level: Not on file   Occupational History    Not on file   Social Needs    Financial resource strain: Not on file    Food insecurity     Worry: Not on file     Inability: Not on file    Transportation needs     Medical: Not on file     Non-medical: Not on file   Tobacco Use    Smoking status: Former Smoker    Smokeless tobacco: Never Used   Substance and Sexual Activity    Alcohol use: Yes     Comment: occasionally     Drug use: Not Currently    Sexual activity: Not on file   Lifestyle    Physical activity     Days per week: Not on file     Minutes per session: Not on file    Stress: Not on file   Relationships    Social connections     Talks on phone: Not on file     Gets together: Not on file     Attends Yazdanism service: Not on file     Active member of club or organization: Not on file     Attends meetings of clubs or organizations: Not on file     Relationship status: Not on file    Intimate partner violence     Fear of current or ex partner: Not on file     Emotionally abused: Not on file     Physically abused: Not on file     Forced sexual activity: Not on file   Other Topics Concern    Not on file   Social History Narrative    Not on file     Review of Systems   Constitutional: Negative for activity change, appetite change, chills, fever and unexpected weight change. HENT: Negative. Eyes: Negative. Respiratory: Negative for cough, choking, chest tightness, shortness of breath and wheezing. Cardiovascular: Positive for leg swelling. Negative for chest pain and palpitations. Gastrointestinal: Positive for abdominal distention, abdominal pain, constipation and nausea. Negative for anal bleeding, blood in stool, diarrhea and vomiting. Endocrine: Negative for cold intolerance, heat intolerance, polydipsia and polyuria. Genitourinary: Negative for discharge, dysuria, frequency, hematuria, penile pain, penile swelling, scrotal swelling, testicular pain and urgency. Musculoskeletal: Positive for arthralgias, back pain, gait problem, joint swelling, myalgias and neck stiffness. Negative for neck pain. Skin: Negative for color change, pallor, rash and wound. Allergic/Immunologic: Negative for environmental allergies and food allergies. Neurological: Positive for dizziness and light-headedness. Negative for seizures, syncope, weakness, numbness and headaches. Hematological: Negative for adenopathy. Does not bruise/bleed easily. Psychiatric/Behavioral: Negative for agitation, confusion, decreased concentration, hallucinations, self-injury and suicidal ideas. The patient is nervous/anxious. The patient is not hyperactive. Objective:   Physical Exam  Constitutional:       General: He is not in acute distress. Appearance: Normal appearance. He is not ill-appearing, toxic-appearing or diaphoretic. HENT:      Head: Normocephalic and atraumatic. Nose: Nose normal.      Mouth/Throat:      Mouth: Mucous membranes are moist.      Pharynx: Oropharynx is clear. Eyes:      General: No scleral icterus. Right eye: No discharge. Left eye: No discharge. Extraocular Movements: Extraocular movements intact. Conjunctiva/sclera: Conjunctivae normal.      Pupils: Pupils are equal, round, and reactive to light. Neck:      Musculoskeletal: Normal range of motion and neck supple. Cardiovascular:      Rate and Rhythm: Normal rate and regular rhythm. Heart sounds: Normal heart sounds. No murmur. Pulmonary:      Effort: Pulmonary effort is normal. No respiratory distress. Breath sounds: Normal breath sounds. No stridor. No wheezing, rhonchi or rales. Abdominal:      General: A surgical scar is present. Bowel sounds are normal. There is no distension. Palpations: Abdomen is soft. There is mass. Tenderness: There is no abdominal tenderness. There is no guarding or rebound. Hernia: A hernia is present. Hernia is present in the umbilical area and ventral area. Musculoskeletal: Normal range of motion. Skin:     General: Skin is warm and dry. Neurological:      General: No focal deficit present. Mental Status: He is alert and oriented to person, place, and time. Psychiatric:         Mood and Affect: Mood normal.         Behavior: Behavior normal.         Thought Content: Thought content normal.         Judgment: Judgment normal.         Assessment:      Abdominal wall mass--doubt this is a hernia--appears more like a mass in soft tissue--could be coming from transverse colon  Ventral hernia  RUQ pain  Nausea       Plan:      Recommend robotic assisted laparoscopic resection of abd wall mass, possible bowel resection, possible open resection, possible hernia repair. The patient was advised of the risks, benefits, complication and options. This included but was not limited to bleeding, wound infection, recurrence of hernia or mass, damage to the bowel or surrounding structures, reaction to anesthesia medicine, and need for open surgery. The patient voiced an understanding and agrees to proceed. Will need bowel prep. Continue to monitor RUQ symptoms--I am not convinced symptoms are related to gallbladder and do not recommend cholecystectomy at this time. Arsenio Torres MD     The patient was counseled at length about the risks of temo Covid-19 during their perioperative period and any recovery window from their procedure. The patient was made aware that temo Covid-19  may worsen their prognosis for recovering from their procedure  and lend to a higher morbidity and/or mortality risk.   All material risks, benefits, and reasonable alternatives including postponing the procedure were discussed. The patient does wish to proceed with the procedure at this time.

## 2021-04-27 ENCOUNTER — TELEPHONE (OUTPATIENT)
Dept: SURGERY | Age: 66
End: 2021-04-27

## 2021-04-27 NOTE — TELEPHONE ENCOUNTER
MA scheduled patient for robotic assisted laparoscopic resection of abdominal wall mass with Dr. Kaya Mosley on 5/18/21 at 7:30AM. Patient needs to arrive at [facility] at 5:30AM.  Confirmed procedure date time and location with patient. Surgery letter and instructions sent via mail. Informed patient of need for COVID testing prior to procedure, to be discussed by PAT.     Electronically signed by Julio Cesar Valdez on 4/27/21 at 4:22 PM EDT

## 2021-04-27 NOTE — TELEPHONE ENCOUNTER
Prior Authorization Form:      DEMOGRAPHICS:                     Patient Name:  Nash Gudino  Patient :  1955            Insurance:  Payor: Marly Melvin  / Plan: Kary Camarena ELEN / Product Type: *No Product type* /   Insurance ID Number:    Payor/Plan Subscr  Sex Relation Sub.  Ins. ID Effective Group Num   1. Germania Perches 1955 Male Self [id number]                                   [address]         DIAGNOSIS & PROCEDURE:                       Procedure/Operation: Robotic assisted laparoscopic resection of abdominal wall mass, possible bowel resection, possible open resection, possible hernia repair           CPT Code: 56328    Diagnosis:  Abdominal wall mass    ICD10 Code: R22.2    Location:  [facility]    Surgeon:  Dr. Raulito Maria INFORMATION:                          Date: 21    Time: 7:30AM              Anesthesia:  General                                                       Status:  Inpatient        Special Comments:  n/a       Electronically signed by Javed Ferris on 2021 at 4:24 PM

## 2021-05-11 RX ORDER — ACETAMINOPHEN 500 MG
1000 TABLET ORAL DAILY PRN
COMMUNITY

## 2021-05-11 NOTE — PROGRESS NOTES
[address] PRE-ADMISSION TESTING GENERAL INSTRUCTIONS- [facility]-phone number:[phone]    GENERAL INSTRUCTIONS  [x] Antibacterial Soap shower Night before  Surgery  [x] Ready Prep CHG wipe instruction sheet and wipes given. [x] Nothing by mouth after midnight, including gum, candy, mints, or water. [x] You may brush your teeth, gargle, but do NOT swallow water. [x]No smoking, chewing tobacco, illegal drugs, or alcohol within 24 hours of your surgery. [x] Jewelry, valuables or body piercing's should not be brought to the hospital. All body and/or tongue piercing's must be removed prior to arriving to hospital.  ALL hair pins must be removed. [x] Do not wear makeup, lotions, powders, deodorant. Nail polish as directed by the nurse. [x] Arrange transportation with a responsible adult  to and from the hospital. If you do not have a responsible adult  to transport you, you will need to make arrangements with a medical transportation company (i.e. [employer]. A Uber/taxi/bus is not appropriate unless you are accompanied by a responsible adult ). Arrange for someone to be with you for the remainder of the day and for 24 hours after your procedure due to having had anesthesia. Who will be your  for transportation? Tasha Pop, spouse    [x] [employer] card and photo ID. [x] Transfusion Bracelet: Please bring with you to hospital, day of surgery       PARKING INSTRUCTIONS:   [x] Arrival Time: 5:30 am, you and your  will need to wear a mask. · [x] Parking lot '\"I\"  is located on [facility] (the corner of [facility]). To enter, press the button and the gate will lift. A free token will be provided to exit the lot. One car per patient is allowed to park in this lot. All other cars are to park on [address] [place] [address] either in the parking garage or the handicap lot.       Walk up the front walk to the [facility], the door will be locked an employee will greet you and let you in. EDUCATION INSTRUCTIONS:        [x] Pre-admission Testing educational folder given  [x] Incentive Spirometry,coughing & deep breathing exercises reviewed. [x]Medication information sheet(s)       [x]Pain: Post-op pain is normal and to be expected. You will be asked to rate your pain from 0-10 (a zero is not acceptable-education is needed). Your post-op pain goal is:   [x] Ask your nurse for your pain medication. [] Other:  Bring with you  ____________    MEDICATION INSTRUCTIONS:  [x]Bring a complete list of your medications, please write the last time you took the medicine, give this list to the nurse. [x] Take the following medications the morning of surgery with 1-2 ounces of water: esomeprazole magnesium  [x] Stop herbal supplements and vitamins 5 days before your surgery. [x] Follow physician instructions regarding any blood thinners you may be taking. WHAT TO EXPECT:  [x] The day of surgery you will be greeted and checked in by the [employer]. Please bring your photo ID and insurance card. A nurse will greet you in accordance to the time you are needed in the pre-op area to prepare you for surgery. Please do not be discouraged if you are not greeted in the order you arrive as there are many variables that are involved in patient preparation. Your patience is greatly appreciated as you wait for your nurse. Please bring in items such as: books, magazines, newspapers, electronics, or any other items  to occupy your time in the waiting area. [x]  Delays may occur with surgery and staff will make a sincere effort to keep you informed of delays. If any delays occur with your procedure, we apologize ahead of time for your inconvenience as we recognize the value of your time.

## 2021-05-13 ENCOUNTER — HOSPITAL ENCOUNTER (OUTPATIENT)
Dept: PREADMISSION TESTING | Age: 66
Discharge: HOME | End: 2021-05-13
Payer: COMMERCIAL

## 2021-05-13 VITALS
WEIGHT: 205 LBS | SYSTOLIC BLOOD PRESSURE: 133 MMHG | HEART RATE: 70 BPM | DIASTOLIC BLOOD PRESSURE: 76 MMHG | HEIGHT: 72 IN | RESPIRATION RATE: 20 BRPM | OXYGEN SATURATION: 98 % | TEMPERATURE: 96.4 F | BODY MASS INDEX: 27.77 KG/M2

## 2021-05-13 DIAGNOSIS — Z01.818 PREOP TESTING: ICD-10-CM

## 2021-05-13 DIAGNOSIS — Z01.812 PRE-OPERATIVE LABORATORY EXAMINATION: Primary | ICD-10-CM

## 2021-05-13 LAB
ABO/RH: NORMAL
ANTIBODY SCREEN: NORMAL
EKG ATRIAL RATE: 64 BPM
EKG Q-T INTERVAL: 416 MS
EKG QRS DURATION: 100 MS
EKG QTC CALCULATION (BAZETT): 418 MS
EKG R AXIS: 14 DEGREES
EKG T AXIS: 42 DEGREES
EKG VENTRICULAR RATE: 61 BPM
HCT VFR BLD CALC: 43.5 % (ref 37–54)
HEMOGLOBIN: 14.1 G/DL (ref 12.5–16.5)
MCH RBC QN AUTO: 27.4 PG (ref 26–35)
MCHC RBC AUTO-ENTMCNC: 32.4 % (ref 32–34.5)
MCV RBC AUTO: 84.5 FL (ref 80–99.9)
PDW BLD-RTO: 13.7 FL (ref 11.5–15)
PLATELET # BLD: 194 E9/L (ref 130–450)
PMV BLD AUTO: 10.1 FL (ref 7–12)
RBC # BLD: 5.15 E12/L (ref 3.8–5.8)
WBC # BLD: 3.9 E9/L (ref 4.5–11.5)

## 2021-05-13 PROCEDURE — 87081 CULTURE SCREEN ONLY: CPT

## 2021-05-13 PROCEDURE — 86900 BLOOD TYPING SEROLOGIC ABO: CPT

## 2021-05-13 PROCEDURE — 86850 RBC ANTIBODY SCREEN: CPT

## 2021-05-13 PROCEDURE — 85027 COMPLETE CBC AUTOMATED: CPT

## 2021-05-13 PROCEDURE — 36415 COLL VENOUS BLD VENIPUNCTURE: CPT

## 2021-05-13 PROCEDURE — 93005 ELECTROCARDIOGRAM TRACING: CPT | Performed by: ANESTHESIOLOGY

## 2021-05-13 PROCEDURE — 93010 ELECTROCARDIOGRAM REPORT: CPT | Performed by: INTERNAL MEDICINE

## 2021-05-13 PROCEDURE — 86901 BLOOD TYPING SEROLOGIC RH(D): CPT

## 2021-05-13 NOTE — PROGRESS NOTES
PATIENT STARES THAT HE HAS RECEIVED BOTH DOSES OF COVID VACCINE. INSTRUCTED TO PLEASE BRING VACCINATION CARD THE MORNING OF HIS SURGERY. PATIENT AGREES.

## 2021-05-14 LAB — MRSA CULTURE ONLY: NORMAL

## 2021-05-17 ENCOUNTER — ANESTHESIA EVENT (OUTPATIENT)
Dept: OPERATING ROOM | Age: 66
End: 2021-05-17
Payer: COMMERCIAL

## 2021-05-17 RX ORDER — SODIUM CHLORIDE 9 MG/ML
INJECTION, SOLUTION INTRAVENOUS CONTINUOUS
Status: CANCELLED | OUTPATIENT
Start: 2021-05-17

## 2021-05-18 ENCOUNTER — ANESTHESIA (OUTPATIENT)
Dept: OPERATING ROOM | Age: 66
End: 2021-05-18
Payer: COMMERCIAL

## 2021-05-18 ENCOUNTER — HOSPITAL ENCOUNTER (OUTPATIENT)
Age: 66
Setting detail: OUTPATIENT SURGERY
Discharge: HOME | End: 2021-05-18
Attending: SURGERY | Admitting: SURGERY
Payer: COMMERCIAL

## 2021-05-18 VITALS
SYSTOLIC BLOOD PRESSURE: 132 MMHG | HEIGHT: 72 IN | WEIGHT: 205 LBS | TEMPERATURE: 97.2 F | OXYGEN SATURATION: 98 % | HEART RATE: 82 BPM | DIASTOLIC BLOOD PRESSURE: 80 MMHG | BODY MASS INDEX: 27.77 KG/M2 | RESPIRATION RATE: 14 BRPM

## 2021-05-18 VITALS — DIASTOLIC BLOOD PRESSURE: 85 MMHG | TEMPERATURE: 97 F | SYSTOLIC BLOOD PRESSURE: 146 MMHG | OXYGEN SATURATION: 100 %

## 2021-05-18 DIAGNOSIS — U07.1 COVID-19: Primary | ICD-10-CM

## 2021-05-18 DIAGNOSIS — K43.6 INCARCERATED VENTRAL HERNIA: ICD-10-CM

## 2021-05-18 PROCEDURE — 2580000003 HC RX 258: Performed by: NURSE ANESTHETIST, CERTIFIED REGISTERED

## 2021-05-18 PROCEDURE — 3700000001 HC ADD 15 MINUTES (ANESTHESIA): Performed by: SURGERY

## 2021-05-18 PROCEDURE — 6370000000 HC RX 637 (ALT 250 FOR IP): Performed by: ANESTHESIOLOGY

## 2021-05-18 PROCEDURE — 3700000000 HC ANESTHESIA ATTENDED CARE: Performed by: SURGERY

## 2021-05-18 PROCEDURE — C1781 MESH (IMPLANTABLE): HCPCS | Performed by: SURGERY

## 2021-05-18 PROCEDURE — 6360000002 HC RX W HCPCS: Performed by: NURSE ANESTHETIST, CERTIFIED REGISTERED

## 2021-05-18 PROCEDURE — 2500000003 HC RX 250 WO HCPCS: Performed by: NURSE ANESTHETIST, CERTIFIED REGISTERED

## 2021-05-18 PROCEDURE — S2900 ROBOTIC SURGICAL SYSTEM: HCPCS | Performed by: SURGERY

## 2021-05-18 PROCEDURE — 2720000010 HC SURG SUPPLY STERILE: Performed by: SURGERY

## 2021-05-18 PROCEDURE — 2580000003 HC RX 258: Performed by: SURGERY

## 2021-05-18 PROCEDURE — 3600000009 HC SURGERY ROBOT BASE: Performed by: SURGERY

## 2021-05-18 PROCEDURE — 2500000003 HC RX 250 WO HCPCS: Performed by: SURGERY

## 2021-05-18 PROCEDURE — 6360000002 HC RX W HCPCS: Performed by: SURGERY

## 2021-05-18 PROCEDURE — 3600000019 HC SURGERY ROBOT ADDTL 15MIN: Performed by: SURGERY

## 2021-05-18 PROCEDURE — 6360000002 HC RX W HCPCS: Performed by: ANESTHESIOLOGY

## 2021-05-18 PROCEDURE — 7100000001 HC PACU RECOVERY - ADDTL 15 MIN: Performed by: SURGERY

## 2021-05-18 PROCEDURE — 2709999900 HC NON-CHARGEABLE SUPPLY: Performed by: SURGERY

## 2021-05-18 PROCEDURE — 7100000011 HC PHASE II RECOVERY - ADDTL 15 MIN: Performed by: SURGERY

## 2021-05-18 PROCEDURE — 7100000010 HC PHASE II RECOVERY - FIRST 15 MIN: Performed by: SURGERY

## 2021-05-18 PROCEDURE — 7100000000 HC PACU RECOVERY - FIRST 15 MIN: Performed by: SURGERY

## 2021-05-18 DEVICE — MESH SURG W6XL10IN OVL W/ ECHO PS POS SYS FOR LAP VENTRAL: Type: IMPLANTABLE DEVICE | Site: ABDOMEN | Status: FUNCTIONAL

## 2021-05-18 RX ORDER — ONDANSETRON 2 MG/ML
INJECTION INTRAMUSCULAR; INTRAVENOUS PRN
Status: DISCONTINUED | OUTPATIENT
Start: 2021-05-18 | End: 2021-05-18 | Stop reason: SDUPTHER

## 2021-05-18 RX ORDER — LIDOCAINE HYDROCHLORIDE 20 MG/ML
INJECTION, SOLUTION INTRAVENOUS PRN
Status: DISCONTINUED | OUTPATIENT
Start: 2021-05-18 | End: 2021-05-18 | Stop reason: SDUPTHER

## 2021-05-18 RX ORDER — KETOROLAC TROMETHAMINE 30 MG/ML
INJECTION, SOLUTION INTRAMUSCULAR; INTRAVENOUS PRN
Status: DISCONTINUED | OUTPATIENT
Start: 2021-05-18 | End: 2021-05-18 | Stop reason: SDUPTHER

## 2021-05-18 RX ORDER — SCOLOPAMINE TRANSDERMAL SYSTEM 1 MG/1
1 PATCH, EXTENDED RELEASE TRANSDERMAL ONCE
Status: DISCONTINUED | OUTPATIENT
Start: 2021-05-18 | End: 2021-05-18 | Stop reason: HOSPADM

## 2021-05-18 RX ORDER — SUCCINYLCHOLINE/SOD CL,ISO/PF 200MG/10ML
SYRINGE (ML) INTRAVENOUS PRN
Status: DISCONTINUED | OUTPATIENT
Start: 2021-05-18 | End: 2021-05-18 | Stop reason: SDUPTHER

## 2021-05-18 RX ORDER — PROMETHAZINE HYDROCHLORIDE 25 MG/ML
6.25 INJECTION, SOLUTION INTRAMUSCULAR; INTRAVENOUS
Status: DISCONTINUED | OUTPATIENT
Start: 2021-05-18 | End: 2021-05-18 | Stop reason: HOSPADM

## 2021-05-18 RX ORDER — FENTANYL CITRATE 50 UG/ML
INJECTION, SOLUTION INTRAMUSCULAR; INTRAVENOUS PRN
Status: DISCONTINUED | OUTPATIENT
Start: 2021-05-18 | End: 2021-05-18 | Stop reason: SDUPTHER

## 2021-05-18 RX ORDER — OXYCODONE HYDROCHLORIDE AND ACETAMINOPHEN 5; 325 MG/1; MG/1
1 TABLET ORAL
Status: COMPLETED | OUTPATIENT
Start: 2021-05-18 | End: 2021-05-18

## 2021-05-18 RX ORDER — OXYCODONE HYDROCHLORIDE AND ACETAMINOPHEN 5; 325 MG/1; MG/1
1 TABLET ORAL EVERY 6 HOURS PRN
Qty: 28 TABLET | Refills: 0 | Status: SHIPPED | OUTPATIENT
Start: 2021-05-18 | End: 2021-05-25

## 2021-05-18 RX ORDER — FENTANYL CITRATE 50 UG/ML
50 INJECTION, SOLUTION INTRAMUSCULAR; INTRAVENOUS EVERY 5 MIN PRN
Status: DISCONTINUED | OUTPATIENT
Start: 2021-05-18 | End: 2021-05-18 | Stop reason: HOSPADM

## 2021-05-18 RX ORDER — MEPERIDINE HYDROCHLORIDE 25 MG/ML
12.5 INJECTION INTRAMUSCULAR; INTRAVENOUS; SUBCUTANEOUS EVERY 5 MIN PRN
Status: DISCONTINUED | OUTPATIENT
Start: 2021-05-18 | End: 2021-05-18 | Stop reason: HOSPADM

## 2021-05-18 RX ORDER — NEOSTIGMINE METHYLSULFATE 1 MG/ML
INJECTION, SOLUTION INTRAVENOUS PRN
Status: DISCONTINUED | OUTPATIENT
Start: 2021-05-18 | End: 2021-05-18 | Stop reason: SDUPTHER

## 2021-05-18 RX ORDER — SODIUM CHLORIDE 9 MG/ML
25 INJECTION, SOLUTION INTRAVENOUS PRN
Status: DISCONTINUED | OUTPATIENT
Start: 2021-05-18 | End: 2021-05-18 | Stop reason: HOSPADM

## 2021-05-18 RX ORDER — BUPIVACAINE HYDROCHLORIDE 5 MG/ML
INJECTION, SOLUTION EPIDURAL; INTRACAUDAL PRN
Status: DISCONTINUED | OUTPATIENT
Start: 2021-05-18 | End: 2021-05-18 | Stop reason: ALTCHOICE

## 2021-05-18 RX ORDER — DEXAMETHASONE SODIUM PHOSPHATE 10 MG/ML
INJECTION INTRAMUSCULAR; INTRAVENOUS PRN
Status: DISCONTINUED | OUTPATIENT
Start: 2021-05-18 | End: 2021-05-18 | Stop reason: SDUPTHER

## 2021-05-18 RX ORDER — DOCUSATE SODIUM 100 MG/1
100 CAPSULE, LIQUID FILLED ORAL 2 TIMES DAILY
Qty: 60 CAPSULE | Refills: 0 | Status: SHIPPED | OUTPATIENT
Start: 2021-05-18 | End: 2021-06-17

## 2021-05-18 RX ORDER — VECURONIUM BROMIDE 1 MG/ML
INJECTION, POWDER, LYOPHILIZED, FOR SOLUTION INTRAVENOUS PRN
Status: DISCONTINUED | OUTPATIENT
Start: 2021-05-18 | End: 2021-05-18 | Stop reason: SDUPTHER

## 2021-05-18 RX ORDER — SODIUM CHLORIDE 9 MG/ML
INJECTION, SOLUTION INTRAVENOUS CONTINUOUS PRN
Status: DISCONTINUED | OUTPATIENT
Start: 2021-05-18 | End: 2021-05-18 | Stop reason: SDUPTHER

## 2021-05-18 RX ORDER — DIPHENHYDRAMINE HYDROCHLORIDE 50 MG/ML
12.5 INJECTION INTRAMUSCULAR; INTRAVENOUS
Status: DISCONTINUED | OUTPATIENT
Start: 2021-05-18 | End: 2021-05-18 | Stop reason: HOSPADM

## 2021-05-18 RX ORDER — PROPOFOL 10 MG/ML
INJECTION, EMULSION INTRAVENOUS PRN
Status: DISCONTINUED | OUTPATIENT
Start: 2021-05-18 | End: 2021-05-18 | Stop reason: SDUPTHER

## 2021-05-18 RX ORDER — MIDAZOLAM HYDROCHLORIDE 1 MG/ML
INJECTION INTRAMUSCULAR; INTRAVENOUS PRN
Status: DISCONTINUED | OUTPATIENT
Start: 2021-05-18 | End: 2021-05-18 | Stop reason: SDUPTHER

## 2021-05-18 RX ORDER — FENTANYL CITRATE 50 UG/ML
25 INJECTION, SOLUTION INTRAMUSCULAR; INTRAVENOUS EVERY 5 MIN PRN
Status: DISCONTINUED | OUTPATIENT
Start: 2021-05-18 | End: 2021-05-18 | Stop reason: HOSPADM

## 2021-05-18 RX ORDER — SODIUM CHLORIDE 0.9 % (FLUSH) 0.9 %
5-40 SYRINGE (ML) INJECTION PRN
Status: DISCONTINUED | OUTPATIENT
Start: 2021-05-18 | End: 2021-05-18 | Stop reason: HOSPADM

## 2021-05-18 RX ORDER — GLYCOPYRROLATE 1 MG/5 ML
SYRINGE (ML) INTRAVENOUS PRN
Status: DISCONTINUED | OUTPATIENT
Start: 2021-05-18 | End: 2021-05-18 | Stop reason: SDUPTHER

## 2021-05-18 RX ORDER — SODIUM CHLORIDE 0.9 % (FLUSH) 0.9 %
5-40 SYRINGE (ML) INJECTION EVERY 12 HOURS SCHEDULED
Status: DISCONTINUED | OUTPATIENT
Start: 2021-05-18 | End: 2021-05-18 | Stop reason: HOSPADM

## 2021-05-18 RX ADMIN — Medication 3 MG: at 11:18

## 2021-05-18 RX ADMIN — FENTANYL CITRATE 50 MCG: 50 INJECTION, SOLUTION INTRAMUSCULAR; INTRAVENOUS at 08:06

## 2021-05-18 RX ADMIN — PROPOFOL 200 MG: 10 INJECTION, EMULSION INTRAVENOUS at 07:49

## 2021-05-18 RX ADMIN — FENTANYL CITRATE 50 MCG: 50 INJECTION, SOLUTION INTRAMUSCULAR; INTRAVENOUS at 12:42

## 2021-05-18 RX ADMIN — FENTANYL CITRATE 50 MCG: 50 INJECTION, SOLUTION INTRAMUSCULAR; INTRAVENOUS at 11:56

## 2021-05-18 RX ADMIN — KETOROLAC TROMETHAMINE 30 MG: 30 INJECTION, SOLUTION INTRAMUSCULAR; INTRAVENOUS at 11:13

## 2021-05-18 RX ADMIN — VECURONIUM BROMIDE 3 MG: 10 INJECTION, POWDER, LYOPHILIZED, FOR SOLUTION INTRAVENOUS at 09:22

## 2021-05-18 RX ADMIN — SODIUM CHLORIDE 25 ML: 9 INJECTION, SOLUTION INTRAVENOUS at 06:27

## 2021-05-18 RX ADMIN — VECURONIUM BROMIDE 1 MG: 10 INJECTION, POWDER, LYOPHILIZED, FOR SOLUTION INTRAVENOUS at 07:49

## 2021-05-18 RX ADMIN — VECURONIUM BROMIDE 1 MG: 10 INJECTION, POWDER, LYOPHILIZED, FOR SOLUTION INTRAVENOUS at 10:15

## 2021-05-18 RX ADMIN — DEXAMETHASONE SODIUM PHOSPHATE 10 MG: 10 INJECTION INTRAMUSCULAR; INTRAVENOUS at 08:02

## 2021-05-18 RX ADMIN — VECURONIUM BROMIDE 1 MG: 10 INJECTION, POWDER, LYOPHILIZED, FOR SOLUTION INTRAVENOUS at 08:23

## 2021-05-18 RX ADMIN — SODIUM CHLORIDE: 9 INJECTION, SOLUTION INTRAVENOUS at 09:34

## 2021-05-18 RX ADMIN — OXYCODONE HYDROCHLORIDE AND ACETAMINOPHEN 1 TABLET: 5; 325 TABLET ORAL at 16:19

## 2021-05-18 RX ADMIN — VECURONIUM BROMIDE 1 MG: 10 INJECTION, POWDER, LYOPHILIZED, FOR SOLUTION INTRAVENOUS at 08:38

## 2021-05-18 RX ADMIN — Medication 160 MG: at 07:49

## 2021-05-18 RX ADMIN — Medication 0.2 MG: at 08:13

## 2021-05-18 RX ADMIN — FENTANYL CITRATE 50 MCG: 50 INJECTION, SOLUTION INTRAMUSCULAR; INTRAVENOUS at 08:16

## 2021-05-18 RX ADMIN — VECURONIUM BROMIDE 6 MG: 10 INJECTION, POWDER, LYOPHILIZED, FOR SOLUTION INTRAVENOUS at 07:55

## 2021-05-18 RX ADMIN — Medication 0.6 MG: at 11:18

## 2021-05-18 RX ADMIN — LIDOCAINE HYDROCHLORIDE 100 MG: 20 INJECTION, SOLUTION INTRAVENOUS at 07:49

## 2021-05-18 RX ADMIN — SODIUM CHLORIDE: 9 INJECTION, SOLUTION INTRAVENOUS at 07:46

## 2021-05-18 RX ADMIN — Medication 2000 MG: at 07:48

## 2021-05-18 RX ADMIN — OXYCODONE HYDROCHLORIDE AND ACETAMINOPHEN 1 TABLET: 5; 325 TABLET ORAL at 13:50

## 2021-05-18 RX ADMIN — MIDAZOLAM 2 MG: 1 INJECTION INTRAMUSCULAR; INTRAVENOUS at 07:35

## 2021-05-18 RX ADMIN — FENTANYL CITRATE 50 MCG: 50 INJECTION, SOLUTION INTRAMUSCULAR; INTRAVENOUS at 10:24

## 2021-05-18 RX ADMIN — FENTANYL CITRATE 100 MCG: 50 INJECTION, SOLUTION INTRAMUSCULAR; INTRAVENOUS at 07:49

## 2021-05-18 RX ADMIN — ONDANSETRON HYDROCHLORIDE 4 MG: 2 INJECTION, SOLUTION INTRAMUSCULAR; INTRAVENOUS at 10:26

## 2021-05-18 RX ADMIN — FENTANYL CITRATE 50 MCG: 50 INJECTION, SOLUTION INTRAMUSCULAR; INTRAVENOUS at 13:19

## 2021-05-18 ASSESSMENT — PULMONARY FUNCTION TESTS
PIF_VALUE: 25
PIF_VALUE: 23
PIF_VALUE: 24
PIF_VALUE: 21
PIF_VALUE: 24
PIF_VALUE: 23
PIF_VALUE: 25
PIF_VALUE: 13
PIF_VALUE: 20
PIF_VALUE: 25
PIF_VALUE: 22
PIF_VALUE: 21
PIF_VALUE: 29
PIF_VALUE: 22
PIF_VALUE: 23
PIF_VALUE: 23
PIF_VALUE: 24
PIF_VALUE: 25
PIF_VALUE: 24
PIF_VALUE: 21
PIF_VALUE: 23
PIF_VALUE: 24
PIF_VALUE: 23
PIF_VALUE: 23
PIF_VALUE: 14
PIF_VALUE: 1
PIF_VALUE: 24
PIF_VALUE: 24
PIF_VALUE: 21
PIF_VALUE: 25
PIF_VALUE: 24
PIF_VALUE: 24
PIF_VALUE: 13
PIF_VALUE: 25
PIF_VALUE: 25
PIF_VALUE: 24
PIF_VALUE: 15
PIF_VALUE: 29
PIF_VALUE: 23
PIF_VALUE: 23
PIF_VALUE: 25
PIF_VALUE: 22
PIF_VALUE: 20
PIF_VALUE: 24
PIF_VALUE: 22
PIF_VALUE: 21
PIF_VALUE: 23
PIF_VALUE: 24
PIF_VALUE: 1
PIF_VALUE: 17
PIF_VALUE: 55
PIF_VALUE: 4
PIF_VALUE: 25
PIF_VALUE: 24
PIF_VALUE: 21
PIF_VALUE: 25
PIF_VALUE: 16
PIF_VALUE: 25
PIF_VALUE: 22
PIF_VALUE: 24
PIF_VALUE: 24
PIF_VALUE: 15
PIF_VALUE: 0
PIF_VALUE: 5
PIF_VALUE: 25
PIF_VALUE: 24
PIF_VALUE: 23
PIF_VALUE: 4
PIF_VALUE: 26
PIF_VALUE: 23
PIF_VALUE: 25
PIF_VALUE: 25
PIF_VALUE: 24
PIF_VALUE: 24
PIF_VALUE: 25
PIF_VALUE: 24
PIF_VALUE: 25
PIF_VALUE: 24
PIF_VALUE: 24
PIF_VALUE: 23
PIF_VALUE: 26
PIF_VALUE: 23
PIF_VALUE: 22
PIF_VALUE: 25
PIF_VALUE: 27
PIF_VALUE: 3
PIF_VALUE: 24
PIF_VALUE: 23
PIF_VALUE: 25
PIF_VALUE: 0
PIF_VALUE: 24
PIF_VALUE: 20
PIF_VALUE: 23
PIF_VALUE: 24
PIF_VALUE: 20
PIF_VALUE: 3
PIF_VALUE: 24
PIF_VALUE: 4
PIF_VALUE: 21
PIF_VALUE: 24
PIF_VALUE: 23
PIF_VALUE: 25
PIF_VALUE: 14
PIF_VALUE: 26
PIF_VALUE: 25
PIF_VALUE: 23
PIF_VALUE: 24
PIF_VALUE: 1
PIF_VALUE: 1
PIF_VALUE: 22
PIF_VALUE: 23
PIF_VALUE: 16
PIF_VALUE: 25
PIF_VALUE: 24
PIF_VALUE: 16
PIF_VALUE: 27
PIF_VALUE: 30
PIF_VALUE: 16
PIF_VALUE: 25
PIF_VALUE: 25
PIF_VALUE: 22

## 2021-05-18 ASSESSMENT — PAIN DESCRIPTION - LOCATION
LOCATION: ABDOMEN

## 2021-05-18 ASSESSMENT — PAIN DESCRIPTION - DESCRIPTORS
DESCRIPTORS: ACHING;SORE
DESCRIPTORS: ACHING;DISCOMFORT;SORE

## 2021-05-18 ASSESSMENT — PAIN SCALES - GENERAL
PAINLEVEL_OUTOF10: 4
PAINLEVEL_OUTOF10: 3
PAINLEVEL_OUTOF10: 0
PAINLEVEL_OUTOF10: 7

## 2021-05-18 ASSESSMENT — PAIN DESCRIPTION - PAIN TYPE
TYPE: SURGICAL PAIN

## 2021-05-18 ASSESSMENT — PAIN - FUNCTIONAL ASSESSMENT: PAIN_FUNCTIONAL_ASSESSMENT: 0-10

## 2021-05-18 NOTE — H&P
Known Allergies     Family History         Family History   Problem Relation Age of Onset    Diabetes Father      Cancer Father           liver + kidney + pancreatic     Alzheimer's Disease Father              Social History               Socioeconomic History    Marital status:        Spouse name: Not on file    Number of children: Not on file    Years of education: Not on file    Highest education level: Not on file   Occupational History    Not on file   Social Needs    Financial resource strain: Not on file    Food insecurity       Worry: Not on file       Inability: Not on file    Transportation needs       Medical: Not on file       Non-medical: Not on file   Tobacco Use    Smoking status: Former Smoker    Smokeless tobacco: Never Used   Substance and Sexual Activity    Alcohol use: Yes       Comment: occasionally     Drug use: Not Currently    Sexual activity: Not on file   Lifestyle    Physical activity       Days per week: Not on file       Minutes per session: Not on file    Stress: Not on file   Relationships    Social connections       Talks on phone: Not on file       Gets together: Not on file       Attends Restoration service: Not on file       Active member of club or organization: Not on file       Attends meetings of clubs or organizations: Not on file       Relationship status: Not on file    Intimate partner violence       Fear of current or ex partner: Not on file       Emotionally abused: Not on file       Physically abused: Not on file       Forced sexual activity: Not on file   Other Topics Concern    Not on file   Social History Narrative    Not on file         Review of Systems   Constitutional: Negative for activity change, appetite change, chills, fever and unexpected weight change. HENT: Negative. Eyes: Negative. Respiratory: Negative for cough, choking, chest tightness, shortness of breath and wheezing. Cardiovascular: Positive for leg swelling. that temo Covid-19  may worsen their prognosis for recovering from their procedure  and lend to a higher morbidity and/or mortality risk. All material risks, benefits, and reasonable alternatives including postponing the procedure were discussed. The patient does wish to proceed with the procedure at this time.      UPDATED 5/18/21  History and physical unchanged  For robotic assisted laparoscopic resection of abd wall mass, possible bowel resection, possible open resection, possible hernia repair    Janeen Gaona MD, FACS  5/18/2021  7:07 AM

## 2021-05-18 NOTE — PROGRESS NOTES
Patient attempted to use bathroom, became dizzy upon standing which did not improve. Patient placed back in bed. Will attempt later.

## 2021-05-18 NOTE — OP NOTE
Operative Note      Patient: Piero Jaramillo  YOB: 1955  MRN: [id number]    Date of Procedure: 5/18/2021    Pre-Op Diagnosis: ABD. WALL MASS    Post-Op Diagnosis: Same and incarcerated ventral hernia       Procedure(s):  ROBOTIC  LAPAROSCOPIC RESECTION OF ABDOMINAL WALL MASS  POSS BOWEL RESECTION POSS OPEN POSS HERNIA    Surgeon(s):  Arsenio Torres MD    Assistant:   Resident: Ryan Moreno MD    Anesthesia: General    Estimated Blood Loss (mL): less than 50     Complications: None    Specimens:   * No specimens in log *    Implants:  Implant Name Type Inv. Item Serial No.  Lot No. LRB No. Used Action   MESH SURG [id number] OVL W/ ECHO PS POS SYS FOR LAP VENTRAL  MESH SURG [id number] OVL W/ ECHO PS POS SYS FOR LAP VENTRAL  BARD DAVOL-WD [id number] N/A 1 Implanted         Drains:   Urethral Catheter 16 fr (Active)       Findings: multiple small ventral hernias with incarcerated fat and bowel at area of mass    Detailed Description of Procedure:   Patient with history of GIST and presented with an abdominal wall mass and ventral hernia. It was unclear if the abdominal wall mass was emanating from the bowel or if this is was a recurrent GIST in his previous incision. It was recommended that he undergo robotic assisted laparoscopic resection of abdominal wall mass and possible bowel resection and repair of ventral hernia. The risks/benefits/alternatives/expected outcomes were explained to the patient. He verbalized understanding agreed to proceed. Patient was brought into the operative suite and placed in supine position. The area the abdomen was prepped and draped in usual sterile fashion after initiation of general anesthesia and after placement of a Webster catheter. Abdominal wall mass could be palpated in the midline incision. Attention was turned towards the left upper quadrant where a #11 scalpel blade was used to make a 1 cm incision for placement of robotic cannula.   Verres needle was inserted in the abdomen and placement was confirmed with a water drop test.  The abdomen was insufflated CO2 and there is adequate gas flow and pressure noted on the monitor. Robotic cannula was inserted into the abdomen. Camera was placed through this cannula and the note of significant adhesions were seen in the abdomen. Attention was then turned towards the left lateral abdomen where a #11 scalpel blade was used to make a 1 cm incision placement of robotic cannula. This cannula was placed in the abdomen direct vision by the camera within the abdomen. Attention was then turned towards the left lower quadrant where a number Leven scalpel is used to make a 1 cm incision for placement of robotic cannula. The skin was inserted under direct vision by the camera within the abdomen. The robot was then docked to the cannulas and the dissection ensued robotically using laparoscopic scissors the lyse of adhesions was undertaken. Lysis of adhesions lasted for greater than 1-1/2 hours as there were dense adhesions throughout the entire abdomen. In the process of lysing the adhesions multiple small ventral hernias were identified incarcerated fat was removed from those defects. A larger defect at the level of the palpated mass on the abdominal wall was encountered. There was noted to be mesenteric fat as well as a loop of small bowel in the hernia defect. Once this was reduced, it was apparent that the abdominal wall mass consisted of incarcerated bowel and fat. The bowel was inspected and there was no mass noted on the bowel. After the extensive lysis of adhesions was completed the hernia defect measured approximately 17 cm in length including all of the small fascial defects. A 15.2 x 25.4 cm ventral light ST mesh with echo positioning system was then selected and inserted through the left upper quadrant cannula after the cannula was upsized to a 12 mm port.   A Edd-Piedad needle was passed through the midline incision grasping the echo positioning system pulling the mesh up to the abdominal wall. The balloon was then inflated on the echo positioning system. A secure strap tacker was then used to tack the mesh circumferentially. A 5 mm cannula was placed in the right lower quadrant to aid in circumferential tacking of the mesh. A double layer of tacks were placed. Following this the abdomen was inspected there was noted to be some bleeding from the mesentery that was dissected from the abdominal wall. This was grasped with a grasper and bleeding was controlled with electrocautery device. The left upper quadrant port was then removed and the fascial defect was closed with a 0 Vicryl suture using a Edd-Piedad needle. The cannulas were then removed and the abdomen was deflated. Skin was then closed with 4-0 Vicryl deep dermal sutures and skin glue was applied over top. The patient was awakened general anesthesia transported to recovery in stable condition. At the end of the case all sponge instrument needle counts were correct. Patient tolerated procedure well and there are no complications. Electronically signed by Yue Young MD on 5/18/2021 at 11:19 AM     NOTE: This report was transcribed using voice recognition software. Every effort was made to ensure accuracy; however, inadvertent computerized transcription errors may be present.

## 2021-05-18 NOTE — PROGRESS NOTES
Reviewed discharge instructions with patient and spouse including discharge medications, follow up and s/s of infection and incision care. Verbalized understanding, no further questions at this time.

## 2021-05-18 NOTE — PROGRESS NOTES
Patient ambulated to bathroom. Having significant pain. New orders received from anesthesia. Patient unable to urinate after several minutes. Oral fluids encouraged.

## 2021-05-18 NOTE — ANESTHESIA PRE PROCEDURE
Department of Anesthesiology  Preprocedure Note       Name:  Casey Valle   Age:  63 y.o.  :  1955                                          MRN:  [id number]         Date:  2021      Surgeon: Sandra Summers):  Hesham Morales MD    Procedure: Procedure(s):  ROBOTIC  LAPAROSCOPIC RESECTION OF ABDOMINAL WALL MASS  POSS BOWEL RESECTION POSS OPEN POSS HERNIA    Medications prior to admission:   Prior to Admission medications    Medication Sig Start Date End Date Taking?  Authorizing Provider   acetaminophen (TYLENOL) 500 MG tablet Take 1,000 mg by mouth daily as needed for Pain   Yes Historical Provider, MD   diclofenac sodium (VOLTAREN) 1 % GEL APPLY 2GRAMS TOPICALLY TO AFFECTED AREA 4 TIMES DAILY AS DIRECTED 21  Yes Historical Provider, MD   fenofibrate (TRICOR) 145 MG tablet TAKE 1 TABLET BY MOUTH ONCE DAILY AS DIRECTED FOR 30 DAYS 20  Yes Historical Provider, MD   levothyroxine (SYNTHROID) 25 MCG tablet TAKE 1 TABLET BY MOUTH ONCE DAILY AS DIRECTED FOR 30 DAYS 20  Yes Historical Provider, MD   Esomeprazole Magnesium (NEXIUM 24HR) 20 MG TBEC Take by mouth daily   Yes Historical Provider, MD       Current medications:    Current Facility-Administered Medications   Medication Dose Route Frequency Provider Last Rate Last Admin    sodium chloride flush 0.9 % injection 5-40 mL  5-40 mL Intravenous 2 times per day Hesham Morales MD        sodium chloride flush 0.9 % injection 5-40 mL  5-40 mL Intravenous PRN Hesham Morales MD        0.9 % sodium chloride infusion  25 mL Intravenous PRN Hesham Morales  mL/hr at 21 0627 25 mL at 21 5061    ceFAZolin (ANCEF) 2000 mg in sterile water 20 mL IV syringe  2,000 mg Intravenous See Admin Instructions Hesham Morales MD           Allergies:  No Known Allergies    Problem List:    Patient Active Problem List   Diagnosis Code    Achilles bursitis of left lower extremity M76.62    Localized edema R60.0    Pain in left ankle and joints of left foot M25.572    Difficulty walking R26.2    Retrocalcaneal exostosis M89.8X7       Past Medical History:        Diagnosis Date    Benign gastrointestinal stromal tumor (GIST)        Past Surgical History:        Procedure Laterality Date    HERNIA REPAIR  1998    KNEE CARTILAGE SURGERY Right 2020    SKIN BIOPSY Right     x2 - right ankle - skin cancer     SMALL INTESTINE SURGERY  1998       Social History:    Social History     Tobacco Use    Smoking status: Former Smoker    Smokeless tobacco: Never Used   Substance Use Topics    Alcohol use: Yes     Alcohol/week: 3.0 standard drinks     Types: 3 Glasses of wine per week                                Counseling given: Not Answered      Vital Signs (Current):   Vitals:    05/11/21 1453 05/18/21 0611   BP:  (!) 142/78   Pulse:  68   Resp:  18   Temp:  97.1 °F (36.2 °C)   TempSrc:  Temporal   SpO2:  100%   Weight: 209 lb (94.8 kg) 205 lb (93 kg)   Height: 6' (1.829 m) 6' (1.829 m)                                              BP Readings from Last 3 Encounters:   05/18/21 (!) 142/78   05/13/21 133/76   04/26/21 135/86       NPO Status: Time of last liquid consumption: 2200                        Time of last solid consumption: 2000                        Date of last liquid consumption: 05/17/21                        Date of last solid food consumption: 05/17/21    BMI:   Wt Readings from Last 3 Encounters:   05/18/21 205 lb (93 kg)   05/13/21 205 lb (93 kg)   04/26/21 209 lb (94.8 kg)     Body mass index is 27.8 kg/m².     CBC:   Lab Results   Component Value Date    WBC 3.9 05/13/2021    RBC 5.15 05/13/2021    HGB 14.1 05/13/2021    HCT 43.5 05/13/2021    MCV 84.5 05/13/2021    RDW 13.7 05/13/2021     05/13/2021       CMP:   Lab Results   Component Value Date     10/18/2016    K 4.6 10/18/2016     10/18/2016    CO2 26 10/18/2016    BUN 15 10/18/2016    CREATININE 1.0 10/18/2016    GFRAA >60 10/18/2016    LABGLOM >60 10/18/2016    GLUCOSE 110 10/18/2016    GLUCOSE 99 08/08/2011    PROT 7.4 10/18/2016    CALCIUM 9.5 10/18/2016    BILITOT 0.4 10/18/2016    ALKPHOS 110 10/18/2016    AST 21 10/18/2016    ALT 33 10/18/2016       POC Tests: No results for input(s): POCGLU, POCNA, POCK, POCCL, POCBUN, POCHEMO, POCHCT in the last 72 hours. Coags: No results found for: PROTIME, INR, APTT    HCG (If Applicable): No results found for: PREGTESTUR, PREGSERUM, HCG, HCGQUANT     ABGs: No results found for: PHART, PO2ART, MSX6ZEN, LSV8HIA, BEART, O8ZXFCVZ     Type & Screen (If Applicable):  No results found for: LABABO, LABRH    Drug/Infectious Status (If Applicable):  No results found for: HIV, HEPCAB    COVID-19 Screening (If Applicable): No results found for: COVID19        Anesthesia Evaluation  Patient summary reviewed no history of anesthetic complications:   Airway: Mallampati: II  TM distance: <3 FB   Neck ROM: full  Mouth opening: > = 3 FB Dental: normal exam         Pulmonary: breath sounds clear to auscultation                            ROS comment: Quit smoking about 5 years ago per patient    Probable VARGHESE - never had sleep study   Cardiovascular:Negative CV ROS  Exercise tolerance: good (>4 METS),         ECG reviewed  Rhythm: regular  Rate: normal                    Neuro/Psych:   Negative Neuro/Psych ROS              GI/Hepatic/Renal:   (+) GERD:,          ROS comment: H/o bowel resection/ ileostomy, reversed (roughly 20 years ago)    Chronic nausea. Endo/Other:    (+) hypothyroidism::., .                 Abdominal:           Vascular: negative vascular ROS. Anesthesia Plan      general     ASA 3     (Patient asked about TAP block but surgeon stated he would use local anesthetic at port sites. There is also a possibility of the procedure being open or more extensive in which case a TAP block would not provide adequate analgesia.   Patient understands and spoke to surgeon as well.     )  Induction: intravenous and rapid sequence. MIPS: Postoperative opioids intended, Prophylactic antiemetics administered and Postoperative trial extubation. Anesthetic plan and risks discussed with patient. Plan discussed with attending.               Kelsey Will MD   5/18/2021  (this addendum was done preop but unable to be filed electronically at that time)

## 2021-05-18 NOTE — ANESTHESIA POSTPROCEDURE EVALUATION
Department of Anesthesiology  Postprocedure Note    Patient: Barby Garcia  MRN: [id number]  YOB: 1955  Date of evaluation: 5/18/2021  Time:  7:20 PM     Procedure Summary     Date: 05/18/21 Room / Location: [place] [address] / [facility]    Anesthesia Start: 9812 Anesthesia Stop: 1531    Procedure: ROBOTIC  LAPAROSCOPIC ASSISTED VENTRAL HERNIA REPAIR WITH MESH AND LYSIS OF ADHESIONS (N/A Abdomen) Diagnosis: (ABD.WALL MASS)    Surgeons: Courtney Chinchilla MD Responsible Provider: Virginie Vega MD    Anesthesia Type: general ASA Status: 3          Anesthesia Type: general    Coby Phase I: Coby Score: 10    Coby Phase II: Coby Score: 10    Last vitals: Reviewed and per EMR flowsheets.        Anesthesia Post Evaluation    Patient location during evaluation: PACU  Patient participation: complete - patient participated  Level of consciousness: awake  Airway patency: patent  Nausea & Vomiting: no vomiting and no nausea  Complications: no  Cardiovascular status: hemodynamically stable  Respiratory status: acceptable  Hydration status: stable

## 2021-05-28 ENCOUNTER — TELEPHONE (OUTPATIENT)
Dept: SURGERY | Age: 66
End: 2021-05-28

## 2021-05-28 NOTE — TELEPHONE ENCOUNTER
----- Message from Alicia Kitchen MD sent at 5/28/2021  2:58 PM EDT -----  Tylenol 650 mg every 4 hours (or 500 mg every 4 hours if he has extra-strength) and ibuprofen 600 mg every 6 hours as needed for pain

## 2021-05-28 NOTE — TELEPHONE ENCOUNTER
MA received a call from patient who states he is out of pain medication and is still experiencing \"discomfort pain\". Patient states he is trying tylenol and ibuprofen with some relief. Patient is questioning if he can have a refill or should he take something else for pain. MA explained I would forward a message to Dr. Sandee Bernheim for further advisement. Patient understood and can be reached at [phone].   Electronically signed by Kathie Cardoso on 5/28/21 at 1:44 PM EDT

## 2021-06-07 ENCOUNTER — OFFICE VISIT (OUTPATIENT)
Dept: SURGERY | Age: 66
End: 2021-06-07
Payer: COMMERCIAL

## 2021-06-07 VITALS
OXYGEN SATURATION: 98 % | BODY MASS INDEX: 28.17 KG/M2 | HEART RATE: 67 BPM | SYSTOLIC BLOOD PRESSURE: 122 MMHG | DIASTOLIC BLOOD PRESSURE: 76 MMHG | TEMPERATURE: 97.4 F | WEIGHT: 208 LBS | RESPIRATION RATE: 16 BRPM | HEIGHT: 72 IN

## 2021-06-07 DIAGNOSIS — Z98.890 S/P REPAIR OF VENTRAL HERNIA: Primary | ICD-10-CM

## 2021-06-07 DIAGNOSIS — Z87.19 S/P REPAIR OF VENTRAL HERNIA: Primary | ICD-10-CM

## 2021-06-07 PROCEDURE — 99024 POSTOP FOLLOW-UP VISIT: CPT | Performed by: SURGERY

## 2021-06-07 NOTE — PROGRESS NOTES
Pt here for follow-up from robotic assisted laparoscopic ventral hernia repair with mesh from 5/18/21. Pt reports minimal discomfort with movement. States incisions are healing well. Tolerating regular diet and having regular BMs. Upon exam, no evidence of hernia recurrence or hernia at port sites; incisions healing well. S/p robotic assisted laparoscopic ventral hernia repair with mesh--pt instructed on activity limitations. Return PRN.     Alicia Kitchen MD, FACS  6/7/2021  2:43 PM

## 2021-06-10 ENCOUNTER — OFFICE VISIT (OUTPATIENT)
Dept: PODIATRY | Age: 66
End: 2021-06-10
Payer: COMMERCIAL

## 2021-06-10 VITALS — HEIGHT: 72 IN | WEIGHT: 208 LBS | BODY MASS INDEX: 28.17 KG/M2

## 2021-06-10 DIAGNOSIS — M89.8X7 RETROCALCANEAL EXOSTOSIS: ICD-10-CM

## 2021-06-10 DIAGNOSIS — M25.572 PAIN IN LEFT ANKLE AND JOINTS OF LEFT FOOT: ICD-10-CM

## 2021-06-10 DIAGNOSIS — M76.62 ACHILLES BURSITIS OF LEFT LOWER EXTREMITY: Primary | ICD-10-CM

## 2021-06-10 PROCEDURE — 99213 OFFICE O/P EST LOW 20 MIN: CPT | Performed by: PODIATRIST

## 2021-06-10 RX ORDER — TRAMADOL HYDROCHLORIDE 50 MG/1
50 TABLET ORAL EVERY 6 HOURS PRN
Qty: 28 TABLET | Refills: 0 | Status: SHIPPED | OUTPATIENT
Start: 2021-06-10 | End: 2021-06-17

## 2021-06-11 NOTE — PROGRESS NOTES
21     Onel [facility]    : 1955   Sex: male    Age: 63 y.o. Patient's PCP/Provider is:  Sandra Mena MD    Subjective:  Patient is seen today for follow-up regarding continued care regarding Achilles tendinosis with associated retrocalcaneal exostosis present left lower extremity. Overall patient is still having issues into the posterior left heel with every day activities and ambulation. Patient did have his gastric surgery which he is well-healed from. Did get clearance from his general surgeon to be able to have the procedure performed in a prone position as well. Patient does want to proceed with outpatient surgical intervention at this time. No other additional abnormalities noted at this time. Chief Complaint   Patient presents with    Follow-up     discuss sx for 21       ROS:  Const: Positives and pertinent negatives as per HPI. Musculo: Denies symptoms other than stated above. Neuro: Denies symptoms other than stated above. Skin: Denies symptoms other than stated above. Current Medications:    Current Outpatient Medications:     traMADol (ULTRAM) 50 MG tablet, Take 1 tablet by mouth every 6 hours as needed for Pain for up to 7 days. Intended supply: 7 days.  Take lowest dose possible to manage pain, Disp: 28 tablet, Rfl: 0    docusate sodium (COLACE) 100 MG capsule, Take 1 capsule by mouth 2 times daily, Disp: 60 capsule, Rfl: 0    acetaminophen (TYLENOL) 500 MG tablet, Take 1,000 mg by mouth daily as needed for Pain, Disp: , Rfl:     diclofenac sodium (VOLTAREN) 1 % GEL, APPLY 2GRAMS TOPICALLY TO AFFECTED AREA 4 TIMES DAILY AS DIRECTED, Disp: , Rfl:     fenofibrate (TRICOR) 145 MG tablet, TAKE 1 TABLET BY MOUTH ONCE DAILY AS DIRECTED FOR 30 DAYS, Disp: , Rfl:     levothyroxine (SYNTHROID) 25 MCG tablet, TAKE 1 TABLET BY MOUTH ONCE DAILY AS DIRECTED FOR 30 DAYS, Disp: , Rfl:     Esomeprazole Magnesium (NEXIUM 24HR) 20 MG TBEC, Take by mouth daily, Disp: , Rfl: Allergies:  No Known Allergies    Vitals:    06/10/21 0913   Weight: 208 lb (94.3 kg)   Height: 6' (1.829 m)       Exam:  VASCULAR: Pedal pulses palpable left foot. Capillary fill time brisk digits 1 through 5 left foot. NEUROLOGICAL: Epicritic sensations intact left foot. DERMATOLOGICAL: Mild edema noted posterior left heel region. No skin abrasions or any signs of infection noted left foot. MUSCULOSKELETAL: Tenderness noted to palpation distal Achilles insertional area. Increased thickening noted distal Achilles tendon. Range of motion does elicit tenderness along the Achilles tendon left. Diagnostic Studies:     No results found. Procedures:    None    Plan Per Assessment  Julio Wood was seen today for follow-up. Diagnoses and all orders for this visit:    Achilles bursitis of left lower extremity  -     traMADol (ULTRAM) 50 MG tablet; Take 1 tablet by mouth every 6 hours as needed for Pain for up to 7 days. Intended supply: 7 days. Take lowest dose possible to manage pain    Retrocalcaneal exostosis  -     traMADol (ULTRAM) 50 MG tablet; Take 1 tablet by mouth every 6 hours as needed for Pain for up to 7 days. Intended supply: 7 days. Take lowest dose possible to manage pain    Pain in left ankle and joints of left foot  -     traMADol (ULTRAM) 50 MG tablet; Take 1 tablet by mouth every 6 hours as needed for Pain for up to 7 days. Intended supply: 7 days. Take lowest dose possible to manage pain      1. Evaluation and management  2. We did discuss surgical intervention which would consist of resection exostosis left heel, and repair Achilles tendon left. 3. The reason for surgery is due to failed conservative treatment and/or conservative treatment is not a viable option. It was discussed with the patient that compliance postoperatively is of utmost importance. Any deviation on behalf of the patient will decrease the chances of a successful outcome.  The risks of surgery were discussed in detail

## 2021-06-25 ENCOUNTER — ANESTHESIA EVENT (OUTPATIENT)
Dept: OPERATING ROOM | Age: 66
End: 2021-06-25
Payer: COMMERCIAL

## 2021-06-25 NOTE — ANESTHESIA PRE PROCEDURE
Department of Anesthesiology  Preprocedure Note       Name:  Deshawn Moran   Age:  63 y.o.  :  1955                                          MRN:  [id number]         Date:  2021      Surgeon: La Hayes):  Kailey Sethi DPM    Procedure: Procedure(s):  RESECTION RETROCALCANEAL EXOSTOSIS LEFT FOOT, REPAIR ACHILLES TENDON LEFT (CPT 28731) ([facility])    Medications prior to admission:   Prior to Admission medications    Medication Sig Start Date End Date Taking? Authorizing Provider   acetaminophen (TYLENOL) 500 MG tablet Take 1,000 mg by mouth daily as needed for Pain    Historical Provider, MD   diclofenac sodium (VOLTAREN) 1 % GEL APPLY 2GRAMS TOPICALLY TO AFFECTED AREA 4 TIMES DAILY AS DIRECTED 21   Historical Provider, MD   fenofibrate (TRICOR) 145 MG tablet TAKE 1 TABLET BY MOUTH ONCE DAILY AS DIRECTED FOR 30 DAYS 20   Historical Provider, MD   levothyroxine (SYNTHROID) 25 MCG tablet TAKE 1 TABLET BY MOUTH ONCE DAILY AS DIRECTED FOR 30 DAYS 20   Historical Provider, MD   Esomeprazole Magnesium (NEXIUM 24HR) 20 MG TBEC Take 20 mg by mouth daily am    Historical Provider, MD       Current medications:    Current Outpatient Medications   Medication Sig Dispense Refill    acetaminophen (TYLENOL) 500 MG tablet Take 1,000 mg by mouth daily as needed for Pain      diclofenac sodium (VOLTAREN) 1 % GEL APPLY 2GRAMS TOPICALLY TO AFFECTED AREA 4 TIMES DAILY AS DIRECTED      fenofibrate (TRICOR) 145 MG tablet TAKE 1 TABLET BY MOUTH ONCE DAILY AS DIRECTED FOR 30 DAYS      levothyroxine (SYNTHROID) 25 MCG tablet TAKE 1 TABLET BY MOUTH ONCE DAILY AS DIRECTED FOR 30 DAYS      Esomeprazole Magnesium (NEXIUM 24HR) 20 MG TBEC Take 20 mg by mouth daily am       No current facility-administered medications for this visit.        Allergies:  No Known Allergies    Problem List:    Patient Active Problem List   Diagnosis Code    Achilles bursitis of left lower extremity M76.62    Localized edema R60.0    Pain in left ankle and joints of left foot M25.572    Difficulty walking R26.2    Retrocalcaneal exostosis M89.8X7    Incarcerated ventral hernia K43.6       Past Medical History:        Diagnosis Date    Benign gastrointestinal stromal tumor (GIST)     Cancer ([address]     skin  squamos    Hyperlipidemia     Thyroid disease        Past Surgical History:        Procedure Laterality Date    COLECTOMY N/A 5/18/2021    ROBOTIC  LAPAROSCOPIC ASSISTED VENTRAL HERNIA REPAIR WITH MESH AND LYSIS OF ADHESIONS performed by Jalen Mcgrath [facility] at [facility] COLONOSCOPY      ENDOSCOPY, COLON, DIAGNOSTIC      [address]    KNEE CARTILAGE SURGERY Right 2020    SKIN BIOPSY Right     x2 - right ankle - skin cancer     SMALL INTESTINE SURGERY  1998       Social History:    Social History     Tobacco Use    Smoking status: Former Smoker     Years: 20.00     Types: Cigarettes    Smokeless tobacco: Never Used   Substance Use Topics    Alcohol use: Yes     Comment: social                                Counseling given: Not Answered      Vital Signs (Current): There were no vitals filed for this visit.                                            BP Readings from Last 3 Encounters:   06/07/21 122/76   05/18/21 (!) 146/85   05/18/21 132/80       NPO Status:                                                                                 BMI:   Wt Readings from Last 3 Encounters:   06/10/21 208 lb (94.3 kg)   06/07/21 208 lb (94.3 kg)   05/18/21 205 lb (93 kg)     There is no height or weight on file to calculate BMI.    CBC:   Lab Results   Component Value Date    WBC 3.9 05/13/2021    RBC 5.15 05/13/2021    HGB 14.1 05/13/2021    HCT 43.5 05/13/2021    MCV 84.5 05/13/2021    RDW 13.7 05/13/2021     05/13/2021       CMP:   Lab Results   Component Value Date     10/18/2016    K 4.6 10/18/2016     10/18/2016    CO2 26 10/18/2016    BUN 15 10/18/2016    CREATININE 1.0 10/18/2016    GFRAA >60 10/18/2016    LABGLOM >60 10/18/2016    GLUCOSE 110 10/18/2016    GLUCOSE 99 08/08/2011    PROT 7.4 10/18/2016    CALCIUM 9.5 10/18/2016    BILITOT 0.4 10/18/2016    ALKPHOS 110 10/18/2016    AST 21 10/18/2016    ALT 33 10/18/2016       POC Tests: No results for input(s): POCGLU, POCNA, POCK, POCCL, POCBUN, POCHEMO, POCHCT in the last 72 hours. Coags: No results found for: PROTIME, INR, APTT    HCG (If Applicable): No results found for: PREGTESTUR, PREGSERUM, HCG, HCGQUANT     ABGs: No results found for: PHART, PO2ART, RGX0MVR, VZX0ZTP, BEART, S6VRATEQ     Type & Screen (If Applicable):  No results found for: LABABO, LABRH    Drug/Infectious Status (If Applicable):  No results found for: HIV, HEPCAB    COVID-19 Screening (If Applicable): No results found for: COVID19        Anesthesia Evaluation  Patient summary reviewed no history of anesthetic complications:   Airway: Mallampati: II  TM distance: <3 FB   Neck ROM: full  Mouth opening: > = 3 FB Dental: normal exam         Pulmonary: breath sounds clear to auscultation      (-) not a current smoker                          ROS comment: Quit smoking about 5 years ago per patient    Probable VARGHESE - never had sleep study   Cardiovascular:  Exercise tolerance: good (>4 METS),   (+) hyperlipidemia      ECG reviewed  Rhythm: regular  Rate: normal                 ROS comment: EKG=Normal sinus rhythm  Abnormal ECG  No previous ECGs available  Confirmed by Senia Richards ([id number]) on 5/13/2021 8:51:21 AM     Neuro/Psych:   Negative Neuro/Psych ROS              GI/Hepatic/Renal:   (+) GERD:,          ROS comment: H/o bowel resection/ ileostomy, reversed (roughly 20 years ago)    Chronic nausea. Endo/Other:    (+) hypothyroidism::., .                  ROS comment: RETROCALCANEAL EXOSTOSIS LEFT FOOT, ACHILLLES BURSTITS\TENDONOSIS LEFT, PAIN IN LEFT ANKLE\FOOT Abdominal:             Vascular: negative vascular ROS.          Other Findings:             Anesthesia Plan      general     ASA 2     (    )  Induction: intravenous. MIPS: Postoperative opioids intended and Prophylactic antiemetics administered. Anesthetic plan and risks discussed with patient. Plan discussed with CRNA. Ale Luna MD   6/25/2021  (this addendum was done preop but unable to be filed electronically at that time)    Pt seen, examined, chart reviewed, plan discussed.   Bel Gardner MD  7/1/2021  7:08 AM

## 2021-06-30 ENCOUNTER — PREP FOR PROCEDURE (OUTPATIENT)
Dept: PODIATRY | Age: 66
End: 2021-06-30

## 2021-06-30 RX ORDER — SODIUM CHLORIDE 0.9 % (FLUSH) 0.9 %
10 SYRINGE (ML) INJECTION EVERY 12 HOURS SCHEDULED
Status: CANCELLED | OUTPATIENT
Start: 2021-06-30

## 2021-06-30 RX ORDER — SODIUM CHLORIDE 0.9 % (FLUSH) 0.9 %
10 SYRINGE (ML) INJECTION PRN
Status: CANCELLED | OUTPATIENT
Start: 2021-06-30

## 2021-06-30 RX ORDER — SODIUM CHLORIDE 9 MG/ML
25 INJECTION, SOLUTION INTRAVENOUS PRN
Status: CANCELLED | OUTPATIENT
Start: 2021-06-30

## 2021-07-01 ENCOUNTER — ANESTHESIA (OUTPATIENT)
Dept: OPERATING ROOM | Age: 66
End: 2021-07-01
Payer: COMMERCIAL

## 2021-07-01 ENCOUNTER — HOSPITAL ENCOUNTER (OUTPATIENT)
Dept: OPERATING ROOM | Age: 66
Setting detail: OUTPATIENT SURGERY
Discharge: HOME | End: 2021-07-01
Attending: PODIATRIST
Payer: COMMERCIAL

## 2021-07-01 ENCOUNTER — HOSPITAL ENCOUNTER (OUTPATIENT)
Age: 66
Setting detail: OUTPATIENT SURGERY
Discharge: HOME | End: 2021-07-01
Attending: PODIATRIST | Admitting: PODIATRIST
Payer: COMMERCIAL

## 2021-07-01 VITALS
BODY MASS INDEX: 27.63 KG/M2 | RESPIRATION RATE: 18 BRPM | HEIGHT: 72 IN | OXYGEN SATURATION: 98 % | TEMPERATURE: 98 F | HEART RATE: 76 BPM | SYSTOLIC BLOOD PRESSURE: 142 MMHG | WEIGHT: 204 LBS | DIASTOLIC BLOOD PRESSURE: 83 MMHG

## 2021-07-01 VITALS
DIASTOLIC BLOOD PRESSURE: 102 MMHG | TEMPERATURE: 96.6 F | OXYGEN SATURATION: 100 % | RESPIRATION RATE: 34 BRPM | SYSTOLIC BLOOD PRESSURE: 142 MMHG

## 2021-07-01 DIAGNOSIS — S86.012A RUPTURE OF LEFT ACHILLES TENDON, INITIAL ENCOUNTER: ICD-10-CM

## 2021-07-01 DIAGNOSIS — M67.88 ACHILLES TENDONOSIS OF LEFT LOWER EXTREMITY: ICD-10-CM

## 2021-07-01 DIAGNOSIS — M89.8X7 RETROCALCANEAL EXOSTOSIS: Primary | ICD-10-CM

## 2021-07-01 DIAGNOSIS — M25.572 PAIN IN LEFT ANKLE AND JOINTS OF LEFT FOOT: ICD-10-CM

## 2021-07-01 PROCEDURE — 2500000003 HC RX 250 WO HCPCS: Performed by: PODIATRIST

## 2021-07-01 PROCEDURE — 7100000011 HC PHASE II RECOVERY - ADDTL 15 MIN: Performed by: PODIATRIST

## 2021-07-01 PROCEDURE — 6360000002 HC RX W HCPCS: Performed by: ANESTHESIOLOGY

## 2021-07-01 PROCEDURE — 7100000000 HC PACU RECOVERY - FIRST 15 MIN: Performed by: PODIATRIST

## 2021-07-01 PROCEDURE — 3700000000 HC ANESTHESIA ATTENDED CARE: Performed by: PODIATRIST

## 2021-07-01 PROCEDURE — 3600000012 HC SURGERY LEVEL 2 ADDTL 15MIN: Performed by: PODIATRIST

## 2021-07-01 PROCEDURE — 6370000000 HC RX 637 (ALT 250 FOR IP)

## 2021-07-01 PROCEDURE — C1713 ANCHOR/SCREW BN/BN,TIS/BN: HCPCS | Performed by: PODIATRIST

## 2021-07-01 PROCEDURE — 2580000003 HC RX 258: Performed by: ANESTHESIOLOGY

## 2021-07-01 PROCEDURE — 6360000002 HC RX W HCPCS

## 2021-07-01 PROCEDURE — 3700000001 HC ADD 15 MINUTES (ANESTHESIA): Performed by: PODIATRIST

## 2021-07-01 PROCEDURE — 7100000001 HC PACU RECOVERY - ADDTL 15 MIN: Performed by: PODIATRIST

## 2021-07-01 PROCEDURE — 6360000002 HC RX W HCPCS: Performed by: NURSE ANESTHETIST, CERTIFIED REGISTERED

## 2021-07-01 PROCEDURE — 28120 PART REMOVAL OF ANKLE/HEEL: CPT | Performed by: PODIATRIST

## 2021-07-01 PROCEDURE — 27654 REPAIR OF ACHILLES TENDON: CPT | Performed by: PODIATRIST

## 2021-07-01 PROCEDURE — 3600000002 HC SURGERY LEVEL 2 BASE: Performed by: PODIATRIST

## 2021-07-01 PROCEDURE — 7100000010 HC PHASE II RECOVERY - FIRST 15 MIN: Performed by: PODIATRIST

## 2021-07-01 PROCEDURE — 6360000002 HC RX W HCPCS: Performed by: PODIATRIST

## 2021-07-01 PROCEDURE — 3209999900 FLUORO FOR SURGICAL PROCEDURES

## 2021-07-01 PROCEDURE — 2709999900 HC NON-CHARGEABLE SUPPLY: Performed by: PODIATRIST

## 2021-07-01 PROCEDURE — 2500000003 HC RX 250 WO HCPCS: Performed by: NURSE ANESTHETIST, CERTIFIED REGISTERED

## 2021-07-01 DEVICE — PACK SUT ANCHR BIOCOMPOSITE SWIVELOCK CONVENIENCE DRL GUID: Type: IMPLANTABLE DEVICE | Site: ACHILLES TENDON | Status: FUNCTIONAL

## 2021-07-01 RX ORDER — BUPIVACAINE HYDROCHLORIDE 5 MG/ML
INJECTION, SOLUTION PERINEURAL PRN
Status: DISCONTINUED | OUTPATIENT
Start: 2021-07-01 | End: 2021-07-01 | Stop reason: ALTCHOICE

## 2021-07-01 RX ORDER — SODIUM CHLORIDE 9 MG/ML
25 INJECTION, SOLUTION INTRAVENOUS PRN
Status: DISCONTINUED | OUTPATIENT
Start: 2021-07-01 | End: 2021-07-01 | Stop reason: HOSPADM

## 2021-07-01 RX ORDER — ONDANSETRON 2 MG/ML
INJECTION INTRAMUSCULAR; INTRAVENOUS PRN
Status: DISCONTINUED | OUTPATIENT
Start: 2021-07-01 | End: 2021-07-01 | Stop reason: SDUPTHER

## 2021-07-01 RX ORDER — KETOROLAC TROMETHAMINE 30 MG/ML
INJECTION, SOLUTION INTRAMUSCULAR; INTRAVENOUS PRN
Status: DISCONTINUED | OUTPATIENT
Start: 2021-07-01 | End: 2021-07-01 | Stop reason: SDUPTHER

## 2021-07-01 RX ORDER — DIPHENHYDRAMINE HYDROCHLORIDE 50 MG/ML
INJECTION INTRAMUSCULAR; INTRAVENOUS PRN
Status: DISCONTINUED | OUTPATIENT
Start: 2021-07-01 | End: 2021-07-01 | Stop reason: SDUPTHER

## 2021-07-01 RX ORDER — MEPERIDINE HYDROCHLORIDE 25 MG/ML
12.5 INJECTION INTRAMUSCULAR; INTRAVENOUS; SUBCUTANEOUS EVERY 5 MIN PRN
Status: DISCONTINUED | OUTPATIENT
Start: 2021-07-01 | End: 2021-07-01 | Stop reason: HOSPADM

## 2021-07-01 RX ORDER — SODIUM CHLORIDE 0.9 % (FLUSH) 0.9 %
10 SYRINGE (ML) INJECTION EVERY 12 HOURS SCHEDULED
Status: DISCONTINUED | OUTPATIENT
Start: 2021-07-01 | End: 2021-07-01 | Stop reason: HOSPADM

## 2021-07-01 RX ORDER — NEOSTIGMINE METHYLSULFATE 1 MG/ML
INJECTION, SOLUTION INTRAVENOUS PRN
Status: DISCONTINUED | OUTPATIENT
Start: 2021-07-01 | End: 2021-07-01 | Stop reason: SDUPTHER

## 2021-07-01 RX ORDER — HYDROMORPHONE HYDROCHLORIDE 1 MG/ML
0.5 INJECTION, SOLUTION INTRAMUSCULAR; INTRAVENOUS; SUBCUTANEOUS
Status: DISCONTINUED | OUTPATIENT
Start: 2021-07-01 | End: 2021-07-01 | Stop reason: HOSPADM

## 2021-07-01 RX ORDER — HYDROCODONE BITARTRATE AND ACETAMINOPHEN 7.5; 325 MG/1; MG/1
1 TABLET ORAL EVERY 6 HOURS PRN
Qty: 20 TABLET | Refills: 0 | Status: SHIPPED | OUTPATIENT
Start: 2021-07-01 | End: 2021-07-06

## 2021-07-01 RX ORDER — PROPOFOL 10 MG/ML
INJECTION, EMULSION INTRAVENOUS PRN
Status: DISCONTINUED | OUTPATIENT
Start: 2021-07-01 | End: 2021-07-01 | Stop reason: SDUPTHER

## 2021-07-01 RX ORDER — CEFAZOLIN SODIUM 2 G/50ML
2000 SOLUTION INTRAVENOUS
Status: COMPLETED | OUTPATIENT
Start: 2021-07-01 | End: 2021-07-01

## 2021-07-01 RX ORDER — DEXAMETHASONE SODIUM PHOSPHATE 4 MG/ML
INJECTION, SOLUTION INTRA-ARTICULAR; INTRALESIONAL; INTRAMUSCULAR; INTRAVENOUS; SOFT TISSUE PRN
Status: DISCONTINUED | OUTPATIENT
Start: 2021-07-01 | End: 2021-07-01 | Stop reason: SDUPTHER

## 2021-07-01 RX ORDER — SODIUM CHLORIDE 0.9 % (FLUSH) 0.9 %
10 SYRINGE (ML) INJECTION PRN
Status: DISCONTINUED | OUTPATIENT
Start: 2021-07-01 | End: 2021-07-01 | Stop reason: HOSPADM

## 2021-07-01 RX ORDER — LABETALOL HYDROCHLORIDE 5 MG/ML
INJECTION, SOLUTION INTRAVENOUS PRN
Status: DISCONTINUED | OUTPATIENT
Start: 2021-07-01 | End: 2021-07-01 | Stop reason: SDUPTHER

## 2021-07-01 RX ORDER — PROMETHAZINE HYDROCHLORIDE 25 MG/ML
25 INJECTION, SOLUTION INTRAMUSCULAR; INTRAVENOUS PRN
Status: DISCONTINUED | OUTPATIENT
Start: 2021-07-01 | End: 2021-07-01 | Stop reason: HOSPADM

## 2021-07-01 RX ORDER — GLYCOPYRROLATE 1 MG/5 ML
SYRINGE (ML) INTRAVENOUS PRN
Status: DISCONTINUED | OUTPATIENT
Start: 2021-07-01 | End: 2021-07-01 | Stop reason: SDUPTHER

## 2021-07-01 RX ORDER — ROCURONIUM BROMIDE 10 MG/ML
INJECTION, SOLUTION INTRAVENOUS PRN
Status: DISCONTINUED | OUTPATIENT
Start: 2021-07-01 | End: 2021-07-01 | Stop reason: SDUPTHER

## 2021-07-01 RX ORDER — HYDROCODONE BITARTRATE AND ACETAMINOPHEN 5; 325 MG/1; MG/1
1 TABLET ORAL EVERY 6 HOURS PRN
Status: DISCONTINUED | OUTPATIENT
Start: 2021-07-01 | End: 2021-07-01 | Stop reason: HOSPADM

## 2021-07-01 RX ORDER — LABETALOL HYDROCHLORIDE 5 MG/ML
5 INJECTION, SOLUTION INTRAVENOUS EVERY 10 MIN PRN
Status: DISCONTINUED | OUTPATIENT
Start: 2021-07-01 | End: 2021-07-01 | Stop reason: HOSPADM

## 2021-07-01 RX ORDER — HYDROCODONE BITARTRATE AND ACETAMINOPHEN 5; 325 MG/1; MG/1
TABLET ORAL
Status: COMPLETED
Start: 2021-07-01 | End: 2021-07-01

## 2021-07-01 RX ORDER — FENTANYL CITRATE 50 UG/ML
50 INJECTION, SOLUTION INTRAMUSCULAR; INTRAVENOUS EVERY 5 MIN PRN
Status: DISCONTINUED | OUTPATIENT
Start: 2021-07-01 | End: 2021-07-01 | Stop reason: HOSPADM

## 2021-07-01 RX ORDER — MIDAZOLAM HYDROCHLORIDE 1 MG/ML
INJECTION INTRAMUSCULAR; INTRAVENOUS PRN
Status: DISCONTINUED | OUTPATIENT
Start: 2021-07-01 | End: 2021-07-01 | Stop reason: SDUPTHER

## 2021-07-01 RX ORDER — FENTANYL CITRATE 50 UG/ML
INJECTION, SOLUTION INTRAMUSCULAR; INTRAVENOUS PRN
Status: DISCONTINUED | OUTPATIENT
Start: 2021-07-01 | End: 2021-07-01 | Stop reason: SDUPTHER

## 2021-07-01 RX ORDER — SODIUM CHLORIDE, SODIUM LACTATE, POTASSIUM CHLORIDE, CALCIUM CHLORIDE 600; 310; 30; 20 MG/100ML; MG/100ML; MG/100ML; MG/100ML
INJECTION, SOLUTION INTRAVENOUS CONTINUOUS
Status: DISCONTINUED | OUTPATIENT
Start: 2021-07-01 | End: 2021-07-01 | Stop reason: HOSPADM

## 2021-07-01 RX ORDER — KETAMINE HYDROCHLORIDE 50 MG/ML
INJECTION, SOLUTION, CONCENTRATE INTRAMUSCULAR; INTRAVENOUS PRN
Status: DISCONTINUED | OUTPATIENT
Start: 2021-07-01 | End: 2021-07-01 | Stop reason: SDUPTHER

## 2021-07-01 RX ORDER — FENTANYL CITRATE 50 UG/ML
25 INJECTION, SOLUTION INTRAMUSCULAR; INTRAVENOUS EVERY 5 MIN PRN
Status: DISCONTINUED | OUTPATIENT
Start: 2021-07-01 | End: 2021-07-01 | Stop reason: HOSPADM

## 2021-07-01 RX ADMIN — Medication 3 MG: at 09:21

## 2021-07-01 RX ADMIN — HYDROMORPHONE HYDROCHLORIDE 0.5 MG: 1 INJECTION, SOLUTION INTRAMUSCULAR; INTRAVENOUS; SUBCUTANEOUS at 10:05

## 2021-07-01 RX ADMIN — SODIUM CHLORIDE, POTASSIUM CHLORIDE, SODIUM LACTATE AND CALCIUM CHLORIDE: 600; 310; 30; 20 INJECTION, SOLUTION INTRAVENOUS at 07:29

## 2021-07-01 RX ADMIN — KETOROLAC TROMETHAMINE 30 MG: 30 INJECTION, SOLUTION INTRAMUSCULAR; INTRAVENOUS at 09:07

## 2021-07-01 RX ADMIN — DIPHENHYDRAMINE HYDROCHLORIDE 12.5 MG: 50 INJECTION, SOLUTION INTRAMUSCULAR; INTRAVENOUS at 09:07

## 2021-07-01 RX ADMIN — ROCURONIUM BROMIDE 35 MG: 10 SOLUTION INTRAVENOUS at 08:13

## 2021-07-01 RX ADMIN — DEXAMETHASONE SODIUM PHOSPHATE 8 MG: 4 INJECTION, SOLUTION INTRAMUSCULAR; INTRAVENOUS at 08:30

## 2021-07-01 RX ADMIN — PROPOFOL 180 MG: 10 INJECTION, EMULSION INTRAVENOUS at 08:13

## 2021-07-01 RX ADMIN — FENTANYL CITRATE 50 MCG: 50 INJECTION, SOLUTION INTRAMUSCULAR; INTRAVENOUS at 08:38

## 2021-07-01 RX ADMIN — FENTANYL CITRATE 100 MCG: 50 INJECTION, SOLUTION INTRAMUSCULAR; INTRAVENOUS at 08:32

## 2021-07-01 RX ADMIN — FENTANYL CITRATE 100 MCG: 50 INJECTION, SOLUTION INTRAMUSCULAR; INTRAVENOUS at 08:13

## 2021-07-01 RX ADMIN — HYDROMORPHONE HYDROCHLORIDE 0.5 MG: 1 INJECTION, SOLUTION INTRAMUSCULAR; INTRAVENOUS; SUBCUTANEOUS at 10:18

## 2021-07-01 RX ADMIN — HYDROCODONE BITARTRATE AND ACETAMINOPHEN 1 TABLET: 5; 325 TABLET ORAL at 10:43

## 2021-07-01 RX ADMIN — Medication 0.6 MG: at 09:21

## 2021-07-01 RX ADMIN — ONDANSETRON 4 MG: 2 INJECTION INTRAMUSCULAR; INTRAVENOUS at 09:07

## 2021-07-01 RX ADMIN — LABETALOL HYDROCHLORIDE 5 MG: 5 INJECTION INTRAVENOUS at 09:21

## 2021-07-01 RX ADMIN — CEFAZOLIN SODIUM 2000 MG: 2 SOLUTION INTRAVENOUS at 08:29

## 2021-07-01 RX ADMIN — MIDAZOLAM 2 MG: 1 INJECTION INTRAMUSCULAR; INTRAVENOUS at 08:10

## 2021-07-01 RX ADMIN — KETAMINE HYDROCHLORIDE 25 MG: 50 INJECTION INTRAMUSCULAR; INTRAVENOUS at 08:32

## 2021-07-01 ASSESSMENT — PULMONARY FUNCTION TESTS
PIF_VALUE: 15
PIF_VALUE: 1
PIF_VALUE: 15
PIF_VALUE: 15
PIF_VALUE: 13
PIF_VALUE: 30
PIF_VALUE: 15
PIF_VALUE: 0
PIF_VALUE: 15
PIF_VALUE: 30
PIF_VALUE: 18
PIF_VALUE: 15
PIF_VALUE: 18
PIF_VALUE: 15
PIF_VALUE: 15
PIF_VALUE: 18
PIF_VALUE: 15
PIF_VALUE: 6
PIF_VALUE: 23
PIF_VALUE: 15
PIF_VALUE: 14
PIF_VALUE: 39
PIF_VALUE: 15
PIF_VALUE: 0
PIF_VALUE: 15
PIF_VALUE: 1
PIF_VALUE: 15
PIF_VALUE: 5
PIF_VALUE: 15
PIF_VALUE: 7
PIF_VALUE: 7
PIF_VALUE: 1
PIF_VALUE: 15
PIF_VALUE: 6
PIF_VALUE: 6
PIF_VALUE: 18
PIF_VALUE: 5
PIF_VALUE: 14
PIF_VALUE: 15
PIF_VALUE: 15
PIF_VALUE: 14
PIF_VALUE: 15
PIF_VALUE: 18
PIF_VALUE: 15
PIF_VALUE: 5
PIF_VALUE: 15
PIF_VALUE: 19
PIF_VALUE: 15
PIF_VALUE: 3
PIF_VALUE: 15
PIF_VALUE: 15
PIF_VALUE: 18
PIF_VALUE: 15
PIF_VALUE: 19

## 2021-07-01 ASSESSMENT — PAIN DESCRIPTION - DESCRIPTORS
DESCRIPTORS: ACHING;CRAMPING
DESCRIPTORS: ACHING
DESCRIPTORS: ACHING;CRAMPING
DESCRIPTORS: BURNING
DESCRIPTORS: ACHING;CONSTANT

## 2021-07-01 ASSESSMENT — PAIN DESCRIPTION - ORIENTATION
ORIENTATION: LEFT;LOWER
ORIENTATION: LEFT;LOWER
ORIENTATION: LEFT
ORIENTATION: LEFT;LOWER
ORIENTATION: LEFT
ORIENTATION: LEFT;LOWER
ORIENTATION: LEFT;LOWER
ORIENTATION: LOWER;LEFT

## 2021-07-01 ASSESSMENT — LIFESTYLE VARIABLES: SMOKING_STATUS: 0

## 2021-07-01 ASSESSMENT — PAIN DESCRIPTION - FREQUENCY
FREQUENCY: CONTINUOUS
FREQUENCY: INTERMITTENT
FREQUENCY: CONTINUOUS
FREQUENCY: CONTINUOUS
FREQUENCY: INTERMITTENT
FREQUENCY: CONTINUOUS

## 2021-07-01 ASSESSMENT — PAIN DESCRIPTION - LOCATION
LOCATION: LEG

## 2021-07-01 ASSESSMENT — PAIN DESCRIPTION - ONSET
ONSET: ON-GOING

## 2021-07-01 ASSESSMENT — PAIN - FUNCTIONAL ASSESSMENT
PAIN_FUNCTIONAL_ASSESSMENT: PREVENTS OR INTERFERES SOME ACTIVE ACTIVITIES AND ADLS
PAIN_FUNCTIONAL_ASSESSMENT: 0-10
PAIN_FUNCTIONAL_ASSESSMENT: PREVENTS OR INTERFERES SOME ACTIVE ACTIVITIES AND ADLS
PAIN_FUNCTIONAL_ASSESSMENT: 0-10

## 2021-07-01 ASSESSMENT — PAIN SCALES - GENERAL
PAINLEVEL_OUTOF10: 4
PAINLEVEL_OUTOF10: 7
PAINLEVEL_OUTOF10: 7
PAINLEVEL_OUTOF10: 4
PAINLEVEL_OUTOF10: 0
PAINLEVEL_OUTOF10: 8
PAINLEVEL_OUTOF10: 6
PAINLEVEL_OUTOF10: 7

## 2021-07-01 ASSESSMENT — PAIN DESCRIPTION - PAIN TYPE
TYPE: SURGICAL PAIN
TYPE: CHRONIC PAIN
TYPE: SURGICAL PAIN

## 2021-07-01 ASSESSMENT — PAIN DESCRIPTION - PROGRESSION
CLINICAL_PROGRESSION: GRADUALLY IMPROVING
CLINICAL_PROGRESSION: GRADUALLY WORSENING
CLINICAL_PROGRESSION: GRADUALLY IMPROVING
CLINICAL_PROGRESSION: NOT CHANGED
CLINICAL_PROGRESSION: NOT CHANGED
CLINICAL_PROGRESSION: GRADUALLY WORSENING

## 2021-07-01 NOTE — OP NOTE
Operative Note      Patient: Austin Moulton  YOB: 1955  MRN: [id number]    Date of Procedure: 7/1/2021    Pre-Op Diagnosis: 1. RETROCALCANEAL EXOSTOSIS LEFT FOOT  2. ACHILLES TENDONOSIS LEFT  3. PAIN IN LEFT ANKLE\FOOT    Post-Op Diagnosis: Same       Procedure(s):  RESECTION RETROCALCANEAL EXOSTOSIS LEFT FOOT, REPAIR ACHILLES TENDON LEFT (CPT 02367) ([facility])    Surgeon(s):  Loulou Butterfield., SUZETTE    Assistant:   * No surgical staff found *    Anesthesia: General    Estimated Blood Loss (mL): Minimal    Complications: None    Specimens:   * No specimens in log *    Implants:  Implant Name Type Inv. Item Serial No.  Lot No. LRB No. Used Action   PACK SUT ANCHR BIOCOMPOSITE SWIVELOCK CONVENIENCE DRL GUID  PACK SUT ANCHR BIOCOMPOSITE SWIVELOCK CONVENIENCE DRL GUID  [address] [id number] Left 1 Implanted         Drains: * No LDAs found *    Findings: Painful retrocalcaneal exostosis left foot. Detailed Description of Procedure:   After proper preoperative evaluation, patient was brought back to the operating room and placed in the supine position initially. General anesthesia was administered per anesthesia department. Was then placed into the prone position appropriately. Patient did receive 2 g of intravenous Ancef preoperatively. It was placed on the patient's well-padded left thigh inflated to 350 mmHg then lowered to the surgical field once proper prepping and draping was performed. Attention was directed towards the posterior left heel region where at this time approximately a 4 cm linear incision was made. Incision was carried down through subcutaneous tissue layers, all vital structures were bovied and retracted as necessary. Central tendon incision was made through the tendinosis portion of the distal Achilles tendon. Distal tendon was freed up from the prominent retrocalcaneal exostosis present. Flouroscopic imaging was obtained to assess spurring.   With use of a sagittal saw the retrocalcaneal exostosis was properly resected. Once adequate resection was obtained we did use the Arthrex Achilles suture bridge anchoring device to reapproximate the Achilles tendon at physiologic tension. Speed bridge was applied per 's recommendations. Dystrophic distal portions of the Achilles tendon was sharply debrided at this time. We did repair the distal Achilles tendon with 2-0 Vicryl in a continuous running fashion. Final steps of the application of the suture bridge was performed tacking down the distal anchors to physiologic tension. With calf compression plantar flexion of the left ankle was noted. Surgical site was flushed with copious amounts of saline. Dermal closure was then performed with 4-0 Vicryl in an interrupted fashion. Epidermal closure was then performed with 3-0 nylon in a continuous interlocking fashion. We did utilize a total of 20 cc of 0.5% Marcaine plain to anesthetize the surgical site left lower extremity. Betadine soaked Adaptic and dry padded dressing was applied to the left lower extremity. Tourniquet was released with total tourniquet time of 55 minutes noted, good vascularity was reestablished to the left foot and digital regions. The patient tolerated the procedure and anesthesia well and left the operating room in stable condition. The patient is being transported to the recovery room for post operative management. Patient and/or caregiver was given postoperative home-going instructions and prescriptions to be taken as directed. Patient and/or caregiver were advised to call the office with any questions or concerns prior to their scheduled postoperative appointment.           Electronically signed by Janes Varma DPM on 7/1/2021 at 4:19 PM

## 2021-07-01 NOTE — H&P
Update History & Physical     The patient's History and Physical this morning was reviewed with the patient and there were no significant changes. I examined the patient and there were no significant changes from the previous History and Physical.     Plan: The risk, benefits, expected outcome, and alternative to the recommended procedure have been discussed with the patient. Patient understands and wants to proceed with the procedure. The procedure discussed is 1. Resection retrocalcaneal exostosis left foot  2.  Repair Achilles tendon left lower extremity        Electronically signed by Toni Allen DPM on 7/1/2021 at 8:03 AM

## 2021-07-02 NOTE — ANESTHESIA POSTPROCEDURE EVALUATION
Department of Anesthesiology  Postprocedure Note    Patient: Elaine Emmanuel  MRN: [id number]  YOB: 1955  Date of evaluation: 7/2/2021  Time:  6:48 AM     Procedure Summary     Date: 07/01/21 Room / Location: [address] [place] [address] 03 / [facility]    Anesthesia Start: 6225 Anesthesia Stop: 5950    Procedure: RESECTION RETROCALCANEAL EXOSTOSIS LEFT FOOT, REPAIR ACHILLES TENDON LEFT (CPT 95428) (ARTHREX) (Left ) Diagnosis: (RETROCALCANEAL EXOSTOSIS LEFT FOOT, ACHILLLES BURSTITS\TENDONOSIS LEFT, PAIN IN LEFT ANKLE\FOOT)    Surgeons: Karrie Ramirez DPM Responsible Provider: Saida Hickman MD    Anesthesia Type: general ASA Status: 2          Anesthesia Type: general    Coby Phase I: Coby Score: 10    Coby Phase II: Coby Score: 10    Last vitals: Reviewed and per EMR flowsheets.        Anesthesia Post Evaluation    Patient location during evaluation: PACU  Patient participation: complete - patient participated  Level of consciousness: awake  Airway patency: patent  Nausea & Vomiting: no nausea and no vomiting  Complications: no  Cardiovascular status: hemodynamically stable  Respiratory status: acceptable  Hydration status: stable

## 2021-07-06 ENCOUNTER — OFFICE VISIT (OUTPATIENT)
Dept: PODIATRY | Age: 66
End: 2021-07-06

## 2021-07-06 VITALS
SYSTOLIC BLOOD PRESSURE: 129 MMHG | DIASTOLIC BLOOD PRESSURE: 84 MMHG | HEIGHT: 72 IN | BODY MASS INDEX: 27.63 KG/M2 | WEIGHT: 204 LBS | HEART RATE: 71 BPM

## 2021-07-06 DIAGNOSIS — M89.8X7 RETROCALCANEAL EXOSTOSIS: ICD-10-CM

## 2021-07-06 DIAGNOSIS — M67.88 ACHILLES TENDONOSIS OF LEFT LOWER EXTREMITY: Primary | ICD-10-CM

## 2021-07-06 PROCEDURE — 99024 POSTOP FOLLOW-UP VISIT: CPT | Performed by: PODIATRIST

## 2021-07-06 NOTE — PROGRESS NOTES
21     Susana Gonzalez    : 1955   Sex: male    Age: 63 y.o. Patient's PCP/Provider is:  Leo Jimenez MD    Subjective:  Patient is seen today for follow-up regarding continued care postoperative management Achilles tendon repair and resection retrocalcaneal exostosis left lower extremity. Overall patient is doing well at this time without any additional issues noted. He has remained nonweightbearing on the left lower extremity as instructed. He is wearing his cam walker as instructed and use of the knee walker. Patient stated he did have a small mishap the first few days with using lysing the knee walker but has kept it elevated and iced since. No other additional abnormalities noted at this time. Chief Complaint   Patient presents with    Post-Op Check     left achilles/heel        ROS:  Const: Positives and pertinent negatives as per HPI. Musculo: Denies symptoms other than stated above. Neuro: Denies symptoms other than stated above. Skin: Denies symptoms other than stated above. Current Medications:    Current Outpatient Medications:     HYDROcodone-acetaminophen (NORCO) 7.5-325 MG per tablet, Take 1 tablet by mouth every 6 hours as needed for Pain for up to 5 days. Intended supply: 5 days.  Take lowest dose possible to manage pain, Disp: 20 tablet, Rfl: 0    acetaminophen (TYLENOL) 500 MG tablet, Take 1,000 mg by mouth daily as needed for Pain, Disp: , Rfl:     diclofenac sodium (VOLTAREN) 1 % GEL, APPLY 2GRAMS TOPICALLY TO AFFECTED AREA 4 TIMES DAILY AS DIRECTED, Disp: , Rfl:     fenofibrate (TRICOR) 145 MG tablet, TAKE 1 TABLET BY MOUTH ONCE DAILY AS DIRECTED FOR 30 DAYS, Disp: , Rfl:     levothyroxine (SYNTHROID) 25 MCG tablet, TAKE 1 TABLET BY MOUTH ONCE DAILY AS DIRECTED FOR 30 DAYS, Disp: , Rfl:     Esomeprazole Magnesium (NEXIUM 24HR) 20 MG TBEC, Take 20 mg by mouth daily am, Disp: , Rfl:     Allergies:  No Known Allergies    Vitals:    21 1148   BP: 129/84 Pulse: 71   Weight: 204 lb (92.5 kg)   Height: 6' (1.829 m)       Exam:  Neurovascular status unchanged. Surgical site coapting nicely with sutures intact left heel. Minimal edema noted and ecchymosis noted left heel. No signs of infection noted surgical site left foot. Minimal tenderness noted into the surgical site posterior left heel/ankle region. Adequate physiologic tension noted Achilles tendon left lower extremity. Diagnostic Studies:     No results found. Procedures:    None    Plan Per Assessment  Doris Torres was seen today for post-op check. Diagnoses and all orders for this visit:    Achilles tendonosis of left lower extremity    Retrocalcaneal exostosis      1. Postoperative evaluation and management  2. Surgical site evaluated and redressed left foot. 3. Patient was advised continued offloading with camwalker and knee walker as previously instructed. 4. Patient will be followed up for continued evaluation and care. Seen By:    Concepción Pendleton DPM    Electronically signed by Concepción Pendleton DPM on 7/6/2021 at 12:06 PM    This note was created using voice recognition software. The note was reviewed however may contain grammatical errors.

## 2021-07-06 NOTE — PROGRESS NOTES
Patient is in today for post op of left heel/achilles. Patient says the pain is minimal, and overall doing well.  pcp is Jennifer Mims MD  Last ov 6/29/21

## 2021-07-13 ENCOUNTER — OFFICE VISIT (OUTPATIENT)
Dept: PODIATRY | Age: 66
End: 2021-07-13

## 2021-07-13 VITALS — BODY MASS INDEX: 27.63 KG/M2 | HEIGHT: 72 IN | WEIGHT: 204 LBS

## 2021-07-13 DIAGNOSIS — M89.8X7 RETROCALCANEAL EXOSTOSIS: ICD-10-CM

## 2021-07-13 DIAGNOSIS — M67.88 ACHILLES TENDONOSIS OF LEFT LOWER EXTREMITY: Primary | ICD-10-CM

## 2021-07-13 PROCEDURE — 99024 POSTOP FOLLOW-UP VISIT: CPT | Performed by: PODIATRIST

## 2021-07-13 NOTE — PROGRESS NOTES
Patient is in today for 1 week post op of left achilles. Patient says the area is a little sore but overall doing well.  pcp is Felipe Cody MD  Last ov 6/29/21
check.    Diagnoses and all orders for this visit:    Achilles tendonosis of left lower extremity  -     Ambulatory referral to Physical Therapy    Retrocalcaneal exostosis      1. Postoperative evaluation and management  2. Surgical site evaluated left foot, dry dressing reapplied left lower extremity. Patient is to continue with the cam walker for all immobilization purposes. 3. Patient was to remain nonweightbearing left lower extremity with current crutch assistance. We will send out physical therapy referral to be started after next week's visit. 4. Patient will be followed up in 1 week's time for continued evaluation and management. We will remove sutures at next visit. He was advised to call the office with any questions or concerns in the interim. Seen By:    Rajwinder Beatty DPM    Electronically signed by Rajwinder Beatty DPM on 7/13/2021 at 1:54 PM    This note was created using voice recognition software. The note was reviewed however may contain grammatical errors.

## 2021-07-22 ENCOUNTER — NURSE ONLY (OUTPATIENT)
Dept: PODIATRY | Age: 66
End: 2021-07-22

## 2021-07-22 VITALS — WEIGHT: 204 LBS | BODY MASS INDEX: 27.63 KG/M2 | HEIGHT: 72 IN

## 2021-07-22 DIAGNOSIS — M67.88 ACHILLES TENDONOSIS OF LEFT LOWER EXTREMITY: Primary | ICD-10-CM

## 2021-07-22 DIAGNOSIS — M89.8X7 RETROCALCANEAL EXOSTOSIS: ICD-10-CM

## 2021-07-22 NOTE — PROGRESS NOTES
Patient is in today for achilles left foot stitches removal. Patient is not having any issues at this time.   pcp is Carla Varela MD  Last ov 6/29/21

## 2021-07-27 ENCOUNTER — OFFICE VISIT (OUTPATIENT)
Dept: PODIATRY | Age: 66
End: 2021-07-27

## 2021-07-27 VITALS — BODY MASS INDEX: 27.63 KG/M2 | WEIGHT: 204 LBS | HEIGHT: 72 IN

## 2021-07-27 DIAGNOSIS — M67.88 ACHILLES TENDONOSIS OF LEFT LOWER EXTREMITY: Primary | ICD-10-CM

## 2021-07-27 DIAGNOSIS — M89.8X7 RETROCALCANEAL EXOSTOSIS: ICD-10-CM

## 2021-07-27 PROCEDURE — 99024 POSTOP FOLLOW-UP VISIT: CPT | Performed by: PODIATRIST

## 2021-07-27 NOTE — PROGRESS NOTES
Patient is in today for 1 week post op of left achilles. Patient is not having any issues at this time.  pcp is Izzy Duvall MD  Last ov 6/29/21

## 2021-07-28 ENCOUNTER — EVALUATION (OUTPATIENT)
Dept: PHYSICAL THERAPY | Age: 66
End: 2021-07-28
Payer: COMMERCIAL

## 2021-07-28 DIAGNOSIS — M67.88 ACHILLES TENDONOSIS OF LEFT LOWER EXTREMITY: Primary | ICD-10-CM

## 2021-07-28 PROCEDURE — 97162 PT EVAL MOD COMPLEX 30 MIN: CPT | Performed by: PHYSICAL THERAPIST

## 2021-07-28 NOTE — PROGRESS NOTES
21     Chloe Morales    : 1955   Sex: male    Age: 63 y.o. Patient's PCP/Provider is:  Leticia Varela MD    Subjective:  Patient is seen today for follow-up regarding continued postoperative evaluation left Achilles tendon repair. Overall patient is doing great at this time without any additional issues noted. Patient has been wearing the cam walker and utilizing crutches as instructed without issues. No other additional abnormalities noted. Chief Complaint   Patient presents with    Post-Op Check     left achilles        ROS:  Const: Positives and pertinent negatives as per HPI. Musculo: Denies symptoms other than stated above. Neuro: Denies symptoms other than stated above. Skin: Denies symptoms other than stated above. Current Medications:    Current Outpatient Medications:     acetaminophen (TYLENOL) 500 MG tablet, Take 1,000 mg by mouth daily as needed for Pain, Disp: , Rfl:     diclofenac sodium (VOLTAREN) 1 % GEL, APPLY 2GRAMS TOPICALLY TO AFFECTED AREA 4 TIMES DAILY AS DIRECTED, Disp: , Rfl:     fenofibrate (TRICOR) 145 MG tablet, TAKE 1 TABLET BY MOUTH ONCE DAILY AS DIRECTED FOR 30 DAYS, Disp: , Rfl:     levothyroxine (SYNTHROID) 25 MCG tablet, TAKE 1 TABLET BY MOUTH ONCE DAILY AS DIRECTED FOR 30 DAYS, Disp: , Rfl:     Esomeprazole Magnesium (NEXIUM 24HR) 20 MG TBEC, Take 20 mg by mouth daily am, Disp: , Rfl:     Allergies:  No Known Allergies    Vitals:    21 0950   Weight: 204 lb (92.5 kg)   Height: 6' (1.829 m)       Exam:  Neurovascular status unchanged. Surgical site well coapted without signs of dehiscence or infection noted posterior left heel region. Adequate range of motion noted left ankle and subtalar joint. Minimal edematous issues noted surgical site left lower extremity. Diagnostic Studies:     No results found. Procedures:    None    Plan Per Assessment  Sergio Houston was seen today for post-op check.     Diagnoses and all orders for this visit:    Achilles tendonosis of left lower extremity    Retrocalcaneal exostosis      1. Postoperative evaluation and management  2. We did discuss continued postoperative care and incision site care. Patient is to continue with the cam walker with crutch assistance at this time. 3. Patient is to start physical therapy tomorrow to increase strength and improve gait. 4. Patient will be followed up in 2 weeks time or sooner if needed for reevaluation. Seen By:    Sumi Novak DPM    Electronically signed by Sumi Novak DPM on 7/28/2021 at 8:32 AM    This note was created using voice recognition software. The note was reviewed however may contain grammatical errors.

## 2021-07-28 NOTE — PROGRESS NOTES
Daily Treatment Note    Date: 2021  Patient Name: Verónica Bolton  : 1955   MRN: [id number]  [facility]: 2020   DOSx: 21  Referring Provider: Dorice Boas., DPM  [address],  [address]     Medical Diagnosis:      Diagnosis Orders   1. Achilles tendonosis of left lower extremity       WB Precaution: WBAT on LLE with 1 crutch and CAM Boot, reassess for WB status on follow up Dr visit on 8/10/21    Outcome Measure: LEFS: 69% impaired on eval.    S: See eval  O:   Time 800-840     Visit 1 Repeat outcome measure at mid point and end. Pain 2/10     ROM See eval     Modalities         MO   Manual            Stretch      Self Stretch - Plantar/Dorsi/Inv/Ever HEP  TE   Wall push   TE   Soleus stretch on wall   TE   Plantar fascia stretch on wall   TE   Gastroc stretch with foot on wall   TE         Exercise      Nustep NEXT  TE   Dorsiflexion  HEP  TE   Plantarflexion  HEP  TE   Inversion/Eversion  HEP  TE   Ankle Alphabet HEP  TE   Ankle Circles             Ankle Rocker      BAPS board      Marching on Foam      Standing Hip Abduction      Standing HS Curls      Toe Raises on Foam   TA   Reach and Touch        SLS      Squats      Lunges      Eccentric Heel Tap   TA   Serbian Split Squat      Single Leg Trinidadian Dead Lift            Leg Press 2 legs   TA   Leg Press 1 leg   TA   Stairs - FWB   TA   Stairs - LAT   TA   Stairs - BWD      Calf Raises      Seated Calf Raises on Knee Ext Machine            Marching Gait      Side Stepping      Karaoke       Hopping            A:  Tolerated well. Above added to written HEP. Discussed anatomy, physiology, body mechanics, principles of loading, and progressive loading/activity.    P: Continue with rehab plan  Angel Vora PT     Treatment Charges: Mins Units   Initial Evaluation 40 1   Re-Evaluation     Ther Exercise         TE     Manual Therapy     MT     Ther Activities        TA     Gait Training          GT     Neuro Re-education NR     Modalities     Non-Billable Service Time     Other     Total Time/Units 40 1

## 2021-07-28 NOTE — PROGRESS NOTES
[facility] OUTPATIENT REHABILITATION  PHYSICAL THERAPY INITIAL EVALUATION         Date:  2021   Patient: Brie Gomez  : 1955  MRN: [id number]  Referring Provider: Madonna Lyles DPM  [facility] [address] [place] [address],  [address]     Medical Diagnosis:      Diagnosis Orders   1. Achilles tendonosis of left lower extremity         Physician Order:   Order Date/Time Release Date/Time Start Date/Time End Date/Time   21 01:54 PM None 2021 None     Authorizing Encounter   SUZETTE Peraza DPM         SUBJECTIVE:     Onset date: 2020    Onset: Gradual Onset    History / Mechanism of Injury: Pt stated that he progressively felt symptoms in his achilles without a specific injury. Pt attended therapy earlier in life and was making incremental progress but still had high levels of pain and elected to have sx. Sx: RESECTION RETROCALCANEAL EXOSTOSIS LEFT FOOT, REPAIR ACHILLES TENDON LEFT on 2021    WB Precaution: WBAT on LLE with 1 crutch and CAM Boot, reassess for WB status on follow up Dr visit on 8/10/21    Previous PT: none    Medical Management for Current Problem: OTC meds     Chief complaint: pain, decreased motion, decreased mobility, weakness    Behavior: condition is getting better    Pain: intermittent  Current: 2/10     Best: 0/10     Worst:5/10. Pain returns to baseline in a few minutes    Symptom Type/Quality: aching  Location[de-identified] Ankle: noted above posterior side      Aggravated by: walking, standing    Relieved by: rest, ice    Imaging results: FLUORO FOR SURGICAL PROCEDURES    Result Date: 2021  Radiology exam is complete. No Radiologist dictation. Please follow up with ordering provider.        Past Medical History:  Past Medical History:   Diagnosis Date    Benign gastrointestinal stromal tumor (GIST)     Cancer ([address]     skin  squamos    Hyperlipidemia     Thyroid disease      Past Surgical History:   Procedure Laterality Date    COLECTOMY N/A 5/18/2021    ROBOTIC  LAPAROSCOPIC ASSISTED VENTRAL HERNIA REPAIR WITH MESH AND LYSIS OF ADHESIONS performed by Bay Erickson MD at [place], COLON, DIAGNOSTIC      HEEL SPUR SURGERY Left 7/1/2021    RESECTION RETROCALCANEAL EXOSTOSIS LEFT FOOT, REPAIR ACHILLES TENDON LEFT (CPT 19015) ([address]) performed by Rajwinder Mcallister DPM at [address] Right 2020    SKIN BIOPSY Right     x2 - right ankle - skin cancer     SMALL INTESTINE SURGERY  1998       Medications:   Current Outpatient Medications   Medication Sig Dispense Refill    acetaminophen (TYLENOL) 500 MG tablet Take 1,000 mg by mouth daily as needed for Pain      diclofenac sodium (VOLTAREN) 1 % GEL APPLY 2GRAMS TOPICALLY TO AFFECTED AREA 4 TIMES DAILY AS DIRECTED      fenofibrate (TRICOR) 145 MG tablet TAKE 1 TABLET BY MOUTH ONCE DAILY AS DIRECTED FOR 30 DAYS      levothyroxine (SYNTHROID) 25 MCG tablet TAKE 1 TABLET BY MOUTH ONCE DAILY AS DIRECTED FOR 30 DAYS      Esomeprazole Magnesium (NEXIUM 24HR) 20 MG TBEC Take 20 mg by mouth daily am       No current facility-administered medications for this visit. Occupation: Hairdresser. Physical demands include: standing, operating hand and arm controls. Status: full time    Exercise regimen: weight training , cardio    Hobbies: yard work, working around [employer], walking distances    Patient Goals: pain relief, return to prior activity, get back to normal    Precautions/Contraindications: WB Precaution aforementioned    OBJECTIVE:     Estimated body mass index is 27.67 kg/m² as calculated from the following:    Height as of 7/27/21: 6' (1.829 m). Weight as of 7/27/21: 204 lb (92.5 kg).      Observations: well nourished male    Inspection: normal orthopedic exam    Edema: moderate posterior    Gait: unsteady, ambulates with 1 crutch Joint/Motion:    Ankle:  Right:   AROM: WNL° Dorsiflexion,  WNL° Plantarflexion, WNL° Inversion, WNL° Eversion   PROM: WNL° Dorsiflexion,  WNL° Plantarflexion, WNL° Inversion, WNL° Eversion   Left:   AROM: 15° Dorsiflexion,  30° Plantarflexion, 15° Inversion, 8° Eversion  PROM: 18° Dorsiflexion,  33° Plantarflexion, 18° Inversion, 10° Eversion    Strength: Ankle:  Right: Dorsiflexion 5/5, Plantarflexion 5/5, Inversion 5/5, Eversion 5/5  Left: Dorsiflexion 4/5, Plantarflexion 4/5, Inversion 4/5, Eversion 4/5    Palpation: Tender to palpation at Achilles tendon. Special Tests/Functional Screens:    [] Anterior Drawer []+ / [] -  [] Eversion Stress: []+ / [] -  [] Felder Test []+ / [] -     [] Tib-Fib Compression Test []+ / [] -    [] Inversion Stress []+ / [] -     [] Squeeze Test []+ / [] -   [] Manjit's Sign []+ / [] -   [] Other: []+ / []      Special test comments:     ASSESSMENT     Outcome Measure:   Lower Extremity Functional Scale (LEFS) 69% impairment    Problems:    Pain reported 5/10   ROM decreased (see above)   Strength decreased (4/5 L ankle)    Decreased functional ability with walking, stairs, standing, use of left lower extremity, bending     [x] There are no barriers affecting plan of care or recovery    [] Barriers to this patient's plan of care or recovery include:     Domestic Concerns:  [x] No  [] Yes:    Short Term goals (3-4 weeks)   Pain 3/10   ROM WFL   Strength 4+/5 L ankle    Able to perform / complete the following functions / tasks: ADLs, normal stair negotiation , hobbies, yard work, reach / lift / carry light weighted items in performance of home or work demands, return to exercise regimen  with less pain.     Lower Extremity Functional Scale (LEFS) 50% impairment    Long Term goals (6-8 weeks)   Pain 0-2/10   ROM WNL   Strength 5/5 L ankle   Able to perform / complete the following functions / tasks: ADLs, ambulate without assistive device, normal stair negotiation , hobbies, yard work, reach / lift / carry medium weighted items in performance of home or work demands, return to exercise regimen with no/minimal pain.  LEFS 0-35% impairment   Independent with home exercise program (HEP)    Rehab Potential: [x] Good  [] Fair  [] Poor    PLAN     **Pt to benefit from skilled PT services in order to improve ROM, strength, functional mobility, endurance, and decrease pain in L ankle/heel. Pt was globally stiff in all directions with fair strength upon assessment. Pt is currently WBAT in Cam boot until next Dr mcwilliams on 8/10/21 with pt potentially having an update in WB status. Pt to POC to include stretches for stiffness, strengthening exercises for weakness, balance and gait training activities for stability and neuromuscular control. Physical therapy plan of care is established based on physician order, patient diagnosis, and clinical assessment. Treatment Plan:   [x] Therapeutic Exercise  [x] Therapeutic Activity  [x] Neuromuscular Re-education   [x] Gait Training  [x] Balance Training  [] Aerobic conditioning  [x] Manual Therapy  [] Massage / Fascial release   [] Work / Sport specific activities    [] Pain Neuroscience [x] Cold / hot pack  [] Vasocompression  [] Electrical Stimulation  [] Lumbar / Cervical Traction  [x] Ultrasound   [] Iontophoresis: 4 mg/mL Dexamethasone Sodium Phosphate 40-80 mAmin        [x] Instruction in HEP      []  Medication allergies reviewed for use of Dexamethasone Sodium Phosphate 4mg/ml  with iontophoresis treatments. Patient is not allergic.       The following CPT codes are likely to be used in the care of this patient: 31264 PT Evaluation: Moderate Complexity , 31101 PT Re-Evaluation , 28283 Therapeutic Exercise , 24638 Neuromuscular Re-Education , 57778 Therapeutic Activities , 06851 Gait Training , 77896 Vasopneumatic Device     **Requesting 72 units of Therapeutic Exercise and Therapeutic Activity, 48 units of Neuromuscular Re-Education, 24 units of Gait Training and Vasopneumatic Device. Suggested Professional Referral: [x] No  [] Yes:     Patient Education:  [x] Plans / Goals, Risks / Benefits discussed  [x] Home exercise program  Method of Education: [x] Verbal  [x] Demo  [x] Written  Comprehension of Education:  [x] Verbalizes understanding. [x] Demonstrates understanding. [] Needs Review. [] Demonstrates / verbalizes understanding of HEP/Ed previously given. Frequency: 1-3 days per week for 6-8 weeks    Patient understands diagnosis/prognosis and consents to treatment, plan and goals: [x] Yes    [] No     Thank you for the opportunity to work with your patient. If you have questions or comments, please contact me at [phone]; fax: [phone]. Electronically signed by: Guillermo Pittman PT         Please sign Physician's Certification and return to: [address] [place] [address]  Dept: [phone]  Dept Fax: [phone] Certification / Comments     Frequency/Duration 1-3 days per week for 6-8 weeks. Certification period from 7/28/2021 to 10/27/2021. I have reviewed the Plan of Care established for skilled therapy services and certify that the services are required and that they will be provided while the patient is under my care.     Physician's Comments/Revisions:               Physician's Printed Name:                                           [de-identified] Signature:                                                               Date:

## 2021-08-04 ENCOUNTER — TREATMENT (OUTPATIENT)
Dept: PHYSICAL THERAPY | Age: 66
End: 2021-08-04
Payer: COMMERCIAL

## 2021-08-04 DIAGNOSIS — M67.88 ACHILLES TENDONOSIS OF LEFT LOWER EXTREMITY: Primary | ICD-10-CM

## 2021-08-04 PROCEDURE — 97110 THERAPEUTIC EXERCISES: CPT | Performed by: PHYSICAL THERAPIST

## 2021-08-04 NOTE — PROGRESS NOTES
Daily Treatment Note    Date: 2021  Patient Name: Karrie Betancourt  : 1955   MRN: [id number]  DOInjury: 2020   DOSx: 21  Referring Provider: No referring provider defined for this encounter. Medical Diagnosis:      Diagnosis Orders   1. Achilles tendonosis of left lower extremity       WB Precaution: WBAT on LLE with 1 crutch and CAM Boot, reassess for WB status on follow up Dr visit on 8/10/21    Outcome Measure: LEFS: 69% impaired on eval.    S: Pt stated that his pain in mild and that he is performing his HEP 3x a day and stated that he feels better but that he is still stiff. O:   Time [id number]     Visit 2 Repeat outcome measure at mid point and end. Pain 2/10     ROM See eval     Modalities         MO   Manual            Stretch      Self Stretch - Plantar/Dorsi/Inv/Ever HEP  TE   Wall push   TE   Soleus stretch on wall   TE   Plantar fascia stretch on wall   TE   Gastroc stretch with foot on wall   TE         Exercise      Nustep 10 mins with boot on  TE   Dorsiflexion  20 reps x 2 set with GREEN band  TE   Plantarflexion  20 reps x 2 set with GREEN band  TE   Inversion/Eversion  20 reps x 2 set with GREEN band  TE   Ankle Alphabet 20 reps x 2 set with GREEN band  TE   Ankle Circles             Ankle Rocker 2 mins x 2     BAPS board 1 minute x 2 sets fwd/bwd, side to side, cwise, ccwise     Marching on Foam      Standing Hip Abduction      Standing HS Curls      Toe Raises on Foam   TA   Reach and Touch        SLS      Squats      Lunges      Eccentric Heel Tap   TA   Tristanian Split Squat      Single Leg Iraqi Dead Lift            Leg Press 2 legs   TA   Leg Press 1 leg   TA   Stairs - FWB   TA   Stairs - LAT   TA   Stairs - BWD      Calf Raises      Seated Calf Raises on Knee Ext Machine            Marching Gait      Side Stepping      Karaoke       Hopping            A:  Tolerated well with mild discomfort.  Pt given GREEN band for light resistance exercises at home and performed during session with VC for technique.    P: Continue with rehab plan  Daljit Gibson PT     Treatment Charges: Mins Units   Initial Evaluation     Re-Evaluation     Ther Exercise         TE 40 3   Manual Therapy     MT     Ther Activities        TA     Gait Training          GT     Neuro Re-education NR     Modalities     Non-Billable Service Time     Other     Total Time/Units 40 3

## 2021-08-06 ENCOUNTER — TREATMENT (OUTPATIENT)
Dept: PHYSICAL THERAPY | Age: 66
End: 2021-08-06
Payer: COMMERCIAL

## 2021-08-06 DIAGNOSIS — M67.88 ACHILLES TENDONOSIS OF LEFT LOWER EXTREMITY: Primary | ICD-10-CM

## 2021-08-06 PROCEDURE — 97110 THERAPEUTIC EXERCISES: CPT | Performed by: PHYSICAL THERAPIST

## 2021-08-06 NOTE — PROGRESS NOTES
Daily Treatment Note    Date: 2021  Patient Name: Lauren Rios  : 1955   MRN: [id number]  [facility]: 2020   DOSx: 21  Referring Provider: No referring provider defined for this encounter. Medical Diagnosis:      Diagnosis Orders   1. Achilles tendonosis of left lower extremity       WB Precaution: WBAT on LLE with 1 crutch and CAM Boot, reassess for WB status on follow up Dr visit on 8/10/21     Outcome Measure: LEFS: 69% impaired on eval.    S: Pt stated that his pain in mild and that his stiffness is subsiding with his L ankle ROM just mildly less than R ankle. O:   Time [id number]     Visit 2 Repeat outcome measure at mid point and end. Pain 2/10     ROM See eval     Modalities         MO   Manual            Stretch      Self Stretch - Plantar/Dorsi/Inv/Ever HEP  TE   Wall push   TE   Soleus stretch on wall   TE   Plantar fascia stretch on wall   TE   Gastroc stretch with foot on wall   TE         Exercise      Nustep 10 mins with boot on  TE   Dorsiflexion  20 reps x 2 set with GREEN band  TE   Plantarflexion  20 reps x 2 set with GREEN band  TE   Inversion/Eversion  20 reps x 2 set with GREEN band  TE   Ankle Alphabet 20 reps x 2 set with GREEN band  TE   Ankle Circles             Ankle Rocker 2 mins x 2     BAPS board 1 minute x 2 sets fwd/bwd, side to side, cwise, ccwise     Marching on Foam      Standing Hip Abduction      Standing HS Curls      Toe Raises on Foam   TA   Reach and Touch        SLS      Squats      Lunges      Eccentric Heel Tap   TA   Yoruba Split Squat      Single Leg Kenyan Dead Lift            Leg Press 2 legs   TA   Leg Press 1 leg   TA   Stairs - FWB   TA   Stairs - LAT   TA   Stairs - BWD      Calf Raises      Seated Calf Raises on Knee Ext Machine            Marching Gait      Side Stepping      Karaoke       Hopping            A:  Tolerated well.  Pt to be reassessed at next  apt on Tuesday 8/10 which will potentially progress POC in therapy. with mild discomfort. Pt given GREEN band for light resistance exercises at home and performed during session with VC for technique.    P: Continue with rehab plan  Gary Rendon PT     Treatment Charges: Mins Units   Initial Evaluation     Re-Evaluation     Ther Exercise         TE 45 3   Manual Therapy     MT     Ther Activities        TA     Gait Training          GT     Neuro Re-education NR     Modalities     Non-Billable Service Time     Other     Total Time/Units 45 3

## 2021-08-10 ENCOUNTER — OFFICE VISIT (OUTPATIENT)
Dept: PODIATRY | Age: 66
End: 2021-08-10

## 2021-08-10 VITALS — BODY MASS INDEX: 27.63 KG/M2 | WEIGHT: 204 LBS | HEIGHT: 72 IN

## 2021-08-10 DIAGNOSIS — M67.88 ACHILLES TENDONOSIS OF LEFT LOWER EXTREMITY: Primary | ICD-10-CM

## 2021-08-10 PROCEDURE — 99024 POSTOP FOLLOW-UP VISIT: CPT | Performed by: PODIATRIST

## 2021-08-10 NOTE — PROGRESS NOTES
Patient is in today for 2 week post op of left achilles. Patient says the incision site has an area that is still open and sore. Patient says he still feels slightly tight, but overall doing well. Patient tolerating PT well.  pcp is Berny Parnell MD  Last ov 6/29/21

## 2021-08-10 NOTE — PROGRESS NOTES
8/10/21     Ervin Sol    : 1955   Sex: male    Age: 63 y.o. Patient's PCP/Provider is:  Myron Covarrubias MD    Subjective:  Patient is seen today for follow-up regarding continued evaluation postoperative management Achilles tendon repair left lower extremity. Overall patient is doing great at this time with continued physical therapy regimen. He is still wearing the cam walker as instructed and utilizing crutch assistance as needed. Patient is not having any significant issues postoperatively. Patient is very pleased with current care and results. Chief Complaint   Patient presents with    Post-Op Check     left achilles        ROS:  Const: Positives and pertinent negatives as per HPI. Musculo: Denies symptoms other than stated above. Neuro: Denies symptoms other than stated above. Skin: Denies symptoms other than stated above. Current Medications:    Current Outpatient Medications:     acetaminophen (TYLENOL) 500 MG tablet, Take 1,000 mg by mouth daily as needed for Pain, Disp: , Rfl:     diclofenac sodium (VOLTAREN) 1 % GEL, APPLY 2GRAMS TOPICALLY TO AFFECTED AREA 4 TIMES DAILY AS DIRECTED, Disp: , Rfl:     fenofibrate (TRICOR) 145 MG tablet, TAKE 1 TABLET BY MOUTH ONCE DAILY AS DIRECTED FOR 30 DAYS, Disp: , Rfl:     levothyroxine (SYNTHROID) 25 MCG tablet, TAKE 1 TABLET BY MOUTH ONCE DAILY AS DIRECTED FOR 30 DAYS, Disp: , Rfl:     Esomeprazole Magnesium (NEXIUM 24HR) 20 MG TBEC, Take 20 mg by mouth daily am, Disp: , Rfl:     Allergies:  No Known Allergies    Vitals:    08/10/21 0812   Weight: 204 lb (92.5 kg)   Height: 6' (1.829 m)       Exam:  Neurovascular status grossly intact left lower extremity. Surgical site well healed without signs of dehiscence noted. Minimal edema noted surgical site left heel. Increased range of motion noted and strength left lower extremity. Stable gait noted upon evaluation left lower extremity.     Diagnostic Studies:     No results found.      Procedures:    None    Plan Per Assessment  Rosemarie Monroe was seen today for post-op check. Diagnoses and all orders for this visit:    Achilles tendonosis of left lower extremity      1. Postoperative evaluation and management  2. We did recommend continued physical therapy sessions at this time as prescribed. 3. Patient is to continue in the cam walker for an additional 1 week timeframe. Patient was advised continued home stretching and strengthening exercises as well. 4. Patient will be followed up in 1 week's time for continued evaluation and management. All disability forms will be filled out on today's visit. Patient is to refrain from all work activities at this time as he continues his postoperative rehabilitation. Seen By:    Lee Rogers DPM    Electronically signed by Lee Rogers DPM on 8/10/2021 at 8:30 AM    This note was created using voice recognition software. The note was reviewed however may contain grammatical errors.

## 2021-08-11 ENCOUNTER — TREATMENT (OUTPATIENT)
Dept: PHYSICAL THERAPY | Age: 66
End: 2021-08-11
Payer: COMMERCIAL

## 2021-08-11 DIAGNOSIS — M67.88 ACHILLES TENDONOSIS OF LEFT LOWER EXTREMITY: Primary | ICD-10-CM

## 2021-08-11 PROCEDURE — 97110 THERAPEUTIC EXERCISES: CPT | Performed by: PHYSICAL THERAPIST

## 2021-08-11 NOTE — PROGRESS NOTES
Daily Treatment Note    Date: 2021  Patient Name: Chloe Morales  : 1955   MRN: [id number]  [facility]: 2020   DOSx: 21  Referring Provider: No referring provider defined for this encounter. Medical Diagnosis:      Diagnosis Orders   1. Achilles tendonosis of left lower extremity       WB Precaution: WBAT with CAM boot on for 1 more week until f/u apt. Outcome Measure: LEFS: 69% impaired on eval.    S: Pt stated that he did not have any pain today and that his Dr appointment went well. Pt stated that if all things go well on next weeks Dr appointment that the pt will be able to walk without the CAM boot. O:   Time [id number]     Visit 3 Repeat outcome measure at mid point and end.     Pain 0/10     ROM See eval     Modalities         MO   Manual            Stretch      Self Stretch - Plantar/Dorsi/Inv/Ever HEP  TE   Wall push   TE   Soleus stretch on wall   TE   Plantar fascia stretch on wall   TE   Gastroc stretch with foot on wall   TE         Exercise      Nustep 10 mins with boot on  TE   Dorsiflexion  20 reps x 2 set with BLUE band  TE   Plantarflexion  20 reps x 2 set with BLUE band  TE   Inversion/Eversion  20 reps x 2 set with BLUE band  TE   Ankle Alphabet 20 reps x 2 set with BLUE band  TE   Ankle Circles             Ankle Rocker 2 mins x 2     BAPS board 1 minute x 1 set fwd/bwd, side to side, cwise, ccwise     Marching  20 reps x 1 set With boot on    Standing Hip Abduction 20 reps x 1 set With boot on    Standing HS Curls 20 reps x 1 set With boot on     Toe Raises    TA   Reach and Touch        SLS      Squats      Lunges      Eccentric Heel Tap   TA   Czech Split Squat      Single Leg Latvian Dead Lift            Leg Press 2 legs   TA   Leg Press 1 leg   TA   Stairs - FWB   TA   Stairs - LAT   TA   Stairs - BWD      Calf Raises      Seated Calf Raises on Knee Ext Machine            Marching Gait      Side Stepping      Karaoke       Hopping            A: Tolerated well. Added standing exercises and tolerated well. Pt given BLUE band for light resistance exercises at home and performed during session with VC for technique.    P: Continue with rehab plan  Po Sosa PT     Treatment Charges: Mins Units   Initial Evaluation     Re-Evaluation     Ther Exercise         TE 45 3   Manual Therapy     MT     Ther Activities        TA     Gait Training          GT     Neuro Re-education NR     Modalities     Non-Billable Service Time     Other     Total Time/Units 45 3

## 2021-08-13 ENCOUNTER — TREATMENT (OUTPATIENT)
Dept: PHYSICAL THERAPY | Age: 66
End: 2021-08-13
Payer: COMMERCIAL

## 2021-08-13 DIAGNOSIS — M67.88 ACHILLES TENDONOSIS OF LEFT LOWER EXTREMITY: Primary | ICD-10-CM

## 2021-08-13 PROCEDURE — 97110 THERAPEUTIC EXERCISES: CPT | Performed by: PHYSICAL THERAPIST

## 2021-08-13 NOTE — PROGRESS NOTES
Daily Treatment Note    Date: 2021  Patient Name: Rock Orellana  : 1955   MRN: [id number]  [facility]: 2020   DOSx: 21  Referring Provider: No referring provider defined for this encounter. Medical Diagnosis:      Diagnosis Orders   1. Achilles tendonosis of left lower extremity       WB Precaution: WBAT with CAM boot on for 1 more week until f/u apt. Outcome Measure: LEFS: 69% impaired on eval.    S: Pt stated that he did not have any pain today and that his HEP is going well. O:   Time [id number]     Visit 3 Repeat outcome measure at mid point and end. Pain 0/10     ROM See eval     Modalities         MO   Manual            Stretch      Dorsiflexion Stretch 5 reps x 1 set with 30s holds   TE   Wall push   TE   Soleus stretch on wall   TE   Plantar fascia stretch on wall   TE   Gastroc stretch with foot on wall   TE         Exercise      Nustep 10 mins with boot on  TE   Dorsiflexion  BLACK BAND NEXT TE   Plantarflexion  BLACK BAND NEXT TE   Inversion/Eversion  BLACK BAND NEXT TE   Ankle Alphabet  TE   Ankle Circles             Ankle Rocker 2 mins x 2     BAPS board 1 minute x 2 sets fwd/bwd, side to side, cwise, ccwise L5    Marching  20 reps x 1 set With boot on    Standing Hip Abduction 20 reps x 1 set With boot on    Standing HS Curls 20 reps x 1 set With boot on     Toe Raises    TA   Reach and Touch        SLS      Squats      Lunges      Eccentric Heel Tap   TA   [place] Islander Split Squat      Single Leg Sinhala Dead Lift            Leg Press 2 legs   TA   Leg Press 1 leg   TA   Stairs - FWB   TA   Stairs - LAT   TA   Stairs - BWD      Calf Raises      Seated Calf Raises on Knee Ext Machine            Marching Gait      Side Stepping      Karaoke       Hopping            A:  Tolerated BAPS board on L5 well as well as longer more sustained holds with stretching without additional irritation/pain.    P: Continue with rehab plan  Mary Kay Will PT     Treatment Charges: Mins

## 2021-08-16 ENCOUNTER — TREATMENT (OUTPATIENT)
Dept: PHYSICAL THERAPY | Age: 66
End: 2021-08-16
Payer: COMMERCIAL

## 2021-08-16 DIAGNOSIS — M67.88 ACHILLES TENDONOSIS OF LEFT LOWER EXTREMITY: Primary | ICD-10-CM

## 2021-08-16 PROCEDURE — 97110 THERAPEUTIC EXERCISES: CPT | Performed by: PHYSICAL THERAPIST

## 2021-08-16 NOTE — PROGRESS NOTES
Daily Treatment Note    Date: 2021  Patient Name: Dwight Mckeon  : 1955   MRN: [id number]  [facility]: 2020   DOSx: 21  Referring Provider: No referring provider defined for this encounter. Medical Diagnosis:      Diagnosis Orders   1. Achilles tendonosis of left lower extremity       WB Precaution: WBAT with CAM boot on for 1 more week until f/u apt. Outcome Measure: LEFS: 69% impaired on eval.     S: Pt stated that he did not have any pain today and that his HEP is going well. O:   Time 1345 - 1425     Visit 4 Repeat outcome measure at mid point and end.     Pain 0/10     ROM See eval     Modalities         MO   Manual            Stretch      Dorsiflexion Stretch 5 reps x 1 set with 30s holds   TE   Wall push   TE   Soleus stretch on wall   TE   Plantar fascia stretch on wall   TE   Gastroc stretch with foot on wall   TE         Exercise      Nustep 10 mins with boot on  TE   Dorsiflexion  20 reps x 2 set with BLACK band TE   Plantarflexion  20 reps x 2 set with BLACK band TE   Inversion/Eversion  20 reps x 2 set with BLACK band TE   Ankle Alphabet 20 reps x 2 set with BLACK band TE   Ankle Circles             Ankle Rocker 2 mins x 2     BAPS board 1 minute x 2 sets fwd/bwd, side to side, cwise, ccwise L5    Marching  20 reps x 1 set With boot on    Standing Hip Abduction 20 reps x 1 set With boot on    Standing HS Curls 20 reps x 1 set With boot on     Toe Raises    TA   Reach and Touch        SLS      Squats      Lunges      Eccentric Heel Tap   TA   [place] Split Squat      Single Leg Belarusian Dead Lift            Leg Press 2 legs   TA   Leg Press 1 leg   TA   Stairs - FWB   TA   Stairs - LAT   TA   Stairs - BWD      Calf Raises      Seated Calf Raises on Knee Ext Machine            Marching Gait      Side Stepping      Karaoke       Hopping            A:  Tolerated BAPS board on L5 well as well as longer more sustained holds with stretching without additional irritation/pain. Pt progressed to BLACK band for exercises and pt given band for home.     P: Continue with rehab plan  Beba Erp, PT     Treatment Charges: Mins Units   Initial Evaluation     Re-Evaluation     Ther Exercise         TE 40 3   Manual Therapy     MT     Ther Activities        TA     Gait Training          GT     Neuro Re-education NR     Modalities     Non-Billable Service Time     Other     Total Time/Units 40 3

## 2021-08-17 ENCOUNTER — OFFICE VISIT (OUTPATIENT)
Dept: PODIATRY | Age: 66
End: 2021-08-17

## 2021-08-17 VITALS — WEIGHT: 204 LBS | HEIGHT: 72 IN | BODY MASS INDEX: 27.63 KG/M2

## 2021-08-17 DIAGNOSIS — M67.88 ACHILLES TENDONOSIS OF LEFT LOWER EXTREMITY: Primary | ICD-10-CM

## 2021-08-17 PROCEDURE — 99024 POSTOP FOLLOW-UP VISIT: CPT | Performed by: PODIATRIST

## 2021-08-17 NOTE — PROGRESS NOTES
Patient is in today for 1 week post op of left achilles.  Patient is not having pain and overall doing well at PT. pcp is Leticia Varela MD  Last ov 6/29/21

## 2021-08-17 NOTE — PROGRESS NOTES
21     Cielo Horner    : 1955   Sex: male    Age: 63 y.o. Patient's PCP/Provider is:  Inez Mckenzie MD    Subjective:  Patient is seen today for follow-up regarding continued postoperative management Achilles tendon repair left lower extremity. Patient has been attending his scheduled therapy sessions with continued improvement noted. Patient did bring his athletic shoe with him today which we are going to transfer him into for his every day activities. Patient is very pleased with results thus far, and denies any additional issues. Chief Complaint   Patient presents with    Post-Op Check     left achilles        ROS:  Const: Positives and pertinent negatives as per HPI. Musculo: Denies symptoms other than stated above. Neuro: Denies symptoms other than stated above. Skin: Denies symptoms other than stated above. Current Medications:    Current Outpatient Medications:     acetaminophen (TYLENOL) 500 MG tablet, Take 1,000 mg by mouth daily as needed for Pain, Disp: , Rfl:     diclofenac sodium (VOLTAREN) 1 % GEL, APPLY 2GRAMS TOPICALLY TO AFFECTED AREA 4 TIMES DAILY AS DIRECTED, Disp: , Rfl:     fenofibrate (TRICOR) 145 MG tablet, TAKE 1 TABLET BY MOUTH ONCE DAILY AS DIRECTED FOR 30 DAYS, Disp: , Rfl:     levothyroxine (SYNTHROID) 25 MCG tablet, TAKE 1 TABLET BY MOUTH ONCE DAILY AS DIRECTED FOR 30 DAYS, Disp: , Rfl:     Esomeprazole Magnesium (NEXIUM 24HR) 20 MG TBEC, Take 20 mg by mouth daily am, Disp: , Rfl:     Allergies:  No Known Allergies    Vitals:    21 0847   Weight: 204 lb (92.5 kg)   Height: 6' (1.829 m)       Exam:  Neurovascular status grossly intact left foot. Surgical site well coapted without signs of dehiscence noted posterior left heel. No edema or ecchymotic skin changes present left lower extremity. Increase strength and range of motion noted left lower extremity. Improved gait noted upon evaluation.       Diagnostic Studies:     No results found.      Procedures:    None    Plan Per Assessment  Rick Babin was seen today for post-op check. Diagnoses and all orders for this visit:    Achilles tendonosis of left lower extremity      1. Evaluation and management  2. We did discuss continued treatment options with patient in detail today with his scheduled and continued PT sessions. 3. Patient is to transition into his good supportive athletic shoe gear with every day activities to tolerance. Patient is to continue with his home stretching and strengthening exercises and icing as needed. 4. Patient will be followed up in 2 weeks time or sooner if needed for continued evaluation and management. He was to call the office with any questions or concerns in the interim. Seen By:    Madonna Thompson DPM    Electronically signed by Madonna Thompson DPM on 8/17/2021 at 10:18 AM    This note was created using voice recognition software. The note was reviewed however may contain grammatical errors.

## 2021-08-18 ENCOUNTER — TREATMENT (OUTPATIENT)
Dept: PHYSICAL THERAPY | Age: 66
End: 2021-08-18
Payer: COMMERCIAL

## 2021-08-18 DIAGNOSIS — M67.88 ACHILLES TENDONOSIS OF LEFT LOWER EXTREMITY: Primary | ICD-10-CM

## 2021-08-18 PROCEDURE — 97110 THERAPEUTIC EXERCISES: CPT | Performed by: PHYSICAL THERAPIST

## 2021-08-18 PROCEDURE — 97530 THERAPEUTIC ACTIVITIES: CPT | Performed by: PHYSICAL THERAPIST

## 2021-08-18 NOTE — PROGRESS NOTES
Daily Treatment Note    Date: 2021  Patient Name: Brie Gomez  : 1955   MRN: [id number]  [facility]: 2020   DOSx: 21  Referring Provider: No referring provider defined for this encounter. Medical Diagnosis:      Diagnosis Orders   1. Achilles tendonosis of left lower extremity       WB Precaution: WBAT with CAM boot on for 1 more week until f/u apt. Outcome Measure: LEFS: 69% impaired on eval.     S: Pt stated   O:   Time 0828-3165     Visit 5 Repeat outcome measure at mid point and end. Pain 4/10     ROM See eval     Modalities         MO   Manual            Stretch      Dorsiflexion Stretch 5 reps x 1 set with 30s holds   TE   Wall push   TE   Soleus stretch on wall   TE   Plantar fascia stretch on wall   TE   Gastroc stretch with foot on wall   TE         Exercise      Nustep 10 mins at 10  TE   Dorsiflexion   TE   Plantarflexion   TE   Inversion/Eversion   TE   Ankle Alphabet  TE   Ankle Circles             Ankle Rocker      BAPS board 1 minute x 2 sets fwd/bwd, side to side, cwise, ccwise L3 standing    Marching  20 reps x 1 set     Standing Hip Abduction 20 reps x 1 set     Standing HS Curls 20 reps x 1 set     Toe Raises  20 reps x 1 set   TA   Reach and Touch        SLS      Squats      Lunges      Eccentric Heel Tap   TA   Lux[place] Split Squat      Single Leg Puerto Rican Dead Lift      Sit to Stands 20 reps x 2 sets      TG Squats 2 legs 20 reps x 2 sets   TA   TG Squats Toe Raises  20 reps x 2 sets      Leg Press 1 leg   TA   Stairs - FWB   TA   Stairs - LAT   TA   Stairs - BWD      Calf Raises 20 reps x 1 set      Seated Calf Raises on Knee Ext Machine            Marching Gait      Side Stepping      Karaoke       Hopping            A:  Tolerated standing exercises fairly well with mild-moderate pain during function. Pt ROM is improving as well as his endurance for function.    P: Continue with rehab plan  Leatha Fonseca PT     Treatment Charges: Mins Units   Initial Evaluation     Re-Evaluation     Ther Exercise         TE 25 2   Manual Therapy     MT     Ther Activities        TA 15 1   Gait Training          GT     Neuro Re-education NR     Modalities     Non-Billable Service Time     Other     Total Time/Units 40 3

## 2021-08-23 ENCOUNTER — OFFICE VISIT (OUTPATIENT)
Dept: SURGERY | Age: 66
End: 2021-08-23
Payer: COMMERCIAL

## 2021-08-23 VITALS
DIASTOLIC BLOOD PRESSURE: 74 MMHG | TEMPERATURE: 97.2 F | RESPIRATION RATE: 16 BRPM | OXYGEN SATURATION: 97 % | HEIGHT: 72 IN | BODY MASS INDEX: 28.71 KG/M2 | SYSTOLIC BLOOD PRESSURE: 146 MMHG | HEART RATE: 103 BPM | WEIGHT: 212 LBS

## 2021-08-23 DIAGNOSIS — R10.9 LEFT SIDED ABDOMINAL PAIN: Primary | ICD-10-CM

## 2021-08-23 PROCEDURE — 99212 OFFICE O/P EST SF 10 MIN: CPT | Performed by: SURGERY

## 2021-08-23 RX ORDER — ATORVASTATIN CALCIUM 10 MG/1
TABLET, FILM COATED ORAL
COMMUNITY
Start: 2021-07-27

## 2021-08-23 NOTE — PROGRESS NOTES
Pt here for complaint of left sided abd pain. Pt underwent ventral hernia repair 5/18/21. Pt reports he developed sharp pain on left side of abdomen about 3 weeks ago. Pain has gradually subsided. Pt tolerating regular diet and having regular bowel movements. Denies nausea/vomiting; denies fevers/chills. Upon exam, no evidence of hernia recurrence or hernia at port sites; non-tender to palpation    Suspect pt had some inflammation in scar tissue from repair which caused some pain--explained this can be expected as activities are increased; if persists, I want to know about it. Pt verbalized understanding.     Return PRN    Celia Little MD, FACS  8/23/2021  1:24 PM

## 2021-08-25 ENCOUNTER — TREATMENT (OUTPATIENT)
Dept: PHYSICAL THERAPY | Age: 66
End: 2021-08-25
Payer: COMMERCIAL

## 2021-08-25 DIAGNOSIS — M67.88 ACHILLES TENDONOSIS OF LEFT LOWER EXTREMITY: Primary | ICD-10-CM

## 2021-08-25 PROCEDURE — 97110 THERAPEUTIC EXERCISES: CPT

## 2021-08-25 NOTE — PROGRESS NOTES
Daily Treatment Note    Date: 2021  Patient Name: Kunal Deng  : 1955   MRN: [id number]  [facility]: 2020   DOSx: 21    Referring Provider:   Nora Michael DPM  [address], [address]       Medical Diagnosis:    Diagnosis Orders   1. Achilles tendonosis of left lower extremity       WB Precaution: WBAT with CAM boot on for 1 more week until f/u apt. Outcome Measure: LEFS: 69% impaired on eval.     S: Pt feeling good. No pain but some tightness. O:   Time 2187-9426     Visit 5 Repeat outcome measure at mid point and end. Pain 0/10     ROM See eval     Modalities         MO   Manual            Stretch      Dorsiflexion Stretch 5 reps x 1 set with 30s holds   TE   Wall push   TE   Soleus stretch on wall   TE   Plantar fascia stretch on wall   TE   Gastroc stretch with foot on wall   TE         Exercise      Nustep 10 mins at 10  TE   Dorsiflexion   TE   Plantarflexion   TE   Inversion/Eversion   TE   Ankle Alphabet  TE   Ankle Circles             Ankle Rocker      BAPS board 1 minute x 2 sets fwd/bwd, side to side, cwise, ccwise L4 standing TE   Marching  20 reps x 1 set Airex next  TE   Standing Hip Abduction 20 reps x 1 set \" TE   Standing HS Curls 20 reps x 1 set \" TE   Toe Raises  20 reps x 1 set  \" TE   Reach and Touch   >  TE   SLS      Squats      Lunges      Eccentric Heel Tap 15 reps x 2  New TA   [facility] Split Squat      Single Leg [place] Dead Lift      Sit to [employer] 20 reps x 2 sets  TA   TG Squats 2 legs 20 reps x 2 sets Increase TA   TG Squats Toe Raises  20 reps x 2 sets  TE   Leg Press 1 leg   TA   Stairs - FWB   TA   Stairs - LAT   TA   Stairs - BWD      Calf Raises 20 reps x 1 set  Progress TE   Seated Calf Raises on Knee Ext Machine            Marching Gait      Side Stepping      Karaoke       Hopping            A: Improved tolerance with no pain following therapy session.    P: Continue with rehab plan  [address] [place] [address], [facility] Treatment Charges: Mins Units   Initial Evaluation     Re-Evaluation     Ther Exercise         TE 30 3   Manual Therapy     MT     Ther Activities        TA     Gait Training          GT     Neuro Re-education NR     Modalities     Non-Billable Service Time     Other     Total Time/Units 30 3

## 2021-08-27 ENCOUNTER — TREATMENT (OUTPATIENT)
Dept: PHYSICAL THERAPY | Age: 66
End: 2021-08-27
Payer: COMMERCIAL

## 2021-08-27 DIAGNOSIS — M67.88 ACHILLES TENDONOSIS OF LEFT LOWER EXTREMITY: Primary | ICD-10-CM

## 2021-08-27 PROCEDURE — 97110 THERAPEUTIC EXERCISES: CPT

## 2021-08-27 NOTE — PROGRESS NOTES
Daily Treatment Note    Date: 2021  Patient Name: Toñito Figueroa  : 1955   MRN: [id number]  [facility]: 2020   DOSx: 21    Referring Provider:   Concepción Freeman, SUZETTE  [address], [address]       Medical Diagnosis:    Diagnosis Orders   1. Achilles tendonosis of left lower extremity       WB Precaution: WBAT with CAM boot on for 1 more week until f/u apt. Outcome Measure: LEFS: 69% impaired on eval.     S: Left foot a little sore. O:   Time 1475-2166     Visit 5 Repeat outcome measure at mid point and end. Pain 0/10     ROM See eval     Modalities         MO   Manual            Stretch      Dorsiflexion Stretch 5 reps x 1 set with 30s holds   TE   Wall push   TE   Soleus stretch on wall   TE   Plantar fascia stretch on wall   TE   Gastroc stretch with foot on wall   TE         Exercise      Nustep 10 mins at 10 Bike next TE   Dorsiflexion   TE   Plantarflexion   TE   Inversion/Eversion   TE   Ankle Alphabet  TE   Ankle Circles             Ankle Rocker      BAPS board 1 minute x 2 sets fwd/bwd, side to side, cwise, ccwise L4 standing TE   Marching  20 reps x 1 set Airex next  TE   Standing Hip Abduction 20 reps x 1 set \" TE   Standing HS Curls 20 reps x 1 set \" TE   Toe Raises  20 reps x 1 set  \" TE   Reach and Touch   >  TE   SLS      Squats      Lunges      Eccentric Heel Tap 5\" 15 reps x 2   TE   L[place] Split Squat      Single Leg [place] Dead Lift      Sit to Stands 20 reps x 2 sets  TA   TG Squats 2 legs 20 reps x 3 sets  TA   TG Squats Toe Raises  20 reps x 3 sets  TE   Leg Press 1 leg   TA   Stairs - FWB   TA   Stairs - LAT   TA   Stairs - BWD      Calf Raises 20 reps x 1 set  Progress TE   Seated Calf Raises on Knee Ext Machine            Marching Gait 45 ft x 2  NR   Side Stepping      Karaoke       Hopping            A: Improved tolerance with no pain following therapy session.    P: Continue with rehab plan  Silverio Raphael PTA     Treatment Charges: Mins Units   Initial Evaluation     Re-Evaluation     Ther Exercise         TE 30 3   Manual Therapy     MT     Ther Activities        TA     Gait Training          GT     Neuro Re-education NR     Modalities     Non-Billable Service Time     Other     Total Time/Units 30 3

## 2021-08-30 ENCOUNTER — TREATMENT (OUTPATIENT)
Dept: PHYSICAL THERAPY | Age: 66
End: 2021-08-30
Payer: COMMERCIAL

## 2021-08-30 DIAGNOSIS — M67.88 ACHILLES TENDONOSIS OF LEFT LOWER EXTREMITY: Primary | ICD-10-CM

## 2021-08-30 PROCEDURE — 97110 THERAPEUTIC EXERCISES: CPT

## 2021-08-30 NOTE — PROGRESS NOTES
Daily Treatment Note    Date: 2021  Patient Name: Gretchen Henry  : 1955   MRN: [id number]  [facility]: 2020   DOSx: 21    Referring Provider:   Airam Barajas DPM  Formerly [facility] [address] [place] [address], [address]       Medical Diagnosis:    Diagnosis Orders   1. Achilles tendonosis of left lower extremity       WB Precaution: WBAT with CAM boot on for 1 more week until f/u apt. Outcome Measure: LEFS: 69% impaired on eval.     S: Left foot a little sore. O:   Time 9988-9411     Visit 5 Repeat outcome measure at mid point and end. Pain 0/10     ROM See eval     Modalities         MO   Manual            Stretch      Dorsiflexion Stretch 5 reps x 1 set with 30s holds   TE   Wall push   TE   Soleus stretch on wall   TE   Plantar fascia stretch on wall   TE   Gastroc stretch with foot on wall   TE         Exercise      Bike 10 min Seat 6 TE   Dorsiflexion   TE   Plantarflexion   TE   Inversion/Eversion   TE   Ankle Alphabet  TE   Ankle Circles             Ankle Rocker      BAPS board 20 reps fwd/bwd, side to side, cwise, ccwise L5 standing TE   Marching  25 reps x 1 set Airex  TE   Standing Hip Abduction 25 reps x 1 set \" TE   Standing HS Curls 25 reps x 1 set \" TE   Toe Raises  25 reps x 1 set  \" TE   Reach and Touch   >  TE   SLS      Squats      Lunges      Eccentric Heel Tap 6\" 15 reps x 2   TE   Luxembourg Split Squat      Single Leg Grenadian Dead Lift      Sit to Stands 20 reps x 2 sets  TA   TG Squats 2 legs 20 reps x 3 sets  TA   TG Squats Toe Raises  20 reps x 3 sets  TE   Leg Press 1 leg   TA   Stairs - FWB   TA   Stairs - LAT   TA   Stairs - BWD      Calf Raises 20 reps x 1 set   TE   Seated Calf Raises on Knee Ext Machine            Marching Gait 45 ft x 2  NR   Side Stepping      Karaoke       Hopping            A: Improved tolerance with no pain following therapy session.    P: Continue with rehab plan  Charlotte Martins PTA     Treatment Charges: Mins Units   Initial

## 2021-08-31 ENCOUNTER — OFFICE VISIT (OUTPATIENT)
Dept: PODIATRY | Age: 66
End: 2021-08-31

## 2021-08-31 VITALS — HEIGHT: 72 IN | BODY MASS INDEX: 28.71 KG/M2 | WEIGHT: 212 LBS

## 2021-08-31 DIAGNOSIS — M67.88 ACHILLES TENDONOSIS OF LEFT LOWER EXTREMITY: Primary | ICD-10-CM

## 2021-08-31 PROCEDURE — 99024 POSTOP FOLLOW-UP VISIT: CPT | Performed by: PODIATRIST

## 2021-08-31 NOTE — PROGRESS NOTES
Patient is in today for 2 week post op left achilles. Patient says the area is still stiff, tight and some swelling. Patient is attending PT as recommended and doing well.  pcp is Adri Gant MD  Last ov 6/29/21

## 2021-08-31 NOTE — PROGRESS NOTES
21     Mohan Jones    : 1955   Sex: male    Age: 63 y.o. Patient's PCP/Provider is:  Radha Silva MD    Subjective:  Patient is seen today for follow-up regarding continued evaluation regarding Achilles tendon repair left lower extremity. Overall patient is doing well at this time without any additional issues noted. Patient has been undergoing physical therapy with noted improvement daily. Patient is back into his regularly supportive shoes and is doing well at this time. No other abnormalities noted. Chief Complaint   Patient presents with    Post-Op Check     left achilles        ROS:  Const: Positives and pertinent negatives as per HPI. Musculo: Denies symptoms other than stated above. Neuro: Denies symptoms other than stated above. Skin: Denies symptoms other than stated above. Current Medications:    Current Outpatient Medications:     atorvastatin (LIPITOR) 10 MG tablet, TAKE 1 TABLET BY MOUTH ONCE DAILY AS DIRECTED FOR 30 DAYS, Disp: , Rfl:     acetaminophen (TYLENOL) 500 MG tablet, Take 1,000 mg by mouth daily as needed for Pain, Disp: , Rfl:     diclofenac sodium (VOLTAREN) 1 % GEL, APPLY 2GRAMS TOPICALLY TO AFFECTED AREA 4 TIMES DAILY AS DIRECTED, Disp: , Rfl:     fenofibrate (TRICOR) 145 MG tablet, TAKE 1 TABLET BY MOUTH ONCE DAILY AS DIRECTED FOR 30 DAYS, Disp: , Rfl:     levothyroxine (SYNTHROID) 25 MCG tablet, TAKE 1 TABLET BY MOUTH ONCE DAILY AS DIRECTED FOR 30 DAYS, Disp: , Rfl:     Esomeprazole Magnesium (NEXIUM 24HR) 20 MG TBEC, Take 20 mg by mouth daily am, Disp: , Rfl:     Allergies:  No Known Allergies    Vitals:    21 0850   Weight: 212 lb (96.2 kg)   Height: 6' (1.829 m)       Exam:  VASCULAR: Pedal pulses palpable left lower extremity  NEUROLOGICAL: Epicritic sensations intact left lower extremity  DERMATOLOGICAL: Incision site well coapted without signs of dehiscence noted.   No appreciable edema or ecchymotic skin changes present posterior aspect left lower extremity. MUSCULOSKELETAL: Increased range of motion noted left ankle and subtalar joint noted. Improved strength noted to both plantarflexion and dorsiflexion tendinous structures. Improved gait noted upon evaluation. Diagnostic Studies:     No results found. Procedures:    None    Plan Per Assessment  Asiya Whitlock was seen today for post-op check. Diagnoses and all orders for this visit:    Achilles tendonosis of left lower extremity      1. Postoperative evaluation and management  2. We did discuss continued therapy sessions at this time for continued rehabilitation. 3. Patient is to continue wearing his good supportive shoe gear with all ambulatory activities. 4. Patient will be followed up at a later date for continued evaluation and management. Seen By:    Marisela Lozano DPM    Electronically signed by Marisela Lozano DPM on 8/31/2021 at 9:01 AM    This note was created using voice recognition software. The note was reviewed however may contain grammatical errors.

## 2021-09-01 ENCOUNTER — TREATMENT (OUTPATIENT)
Dept: PHYSICAL THERAPY | Age: 66
End: 2021-09-01
Payer: COMMERCIAL

## 2021-09-01 DIAGNOSIS — M67.88 ACHILLES TENDONOSIS OF LEFT LOWER EXTREMITY: Primary | ICD-10-CM

## 2021-09-01 PROCEDURE — 97110 THERAPEUTIC EXERCISES: CPT

## 2021-09-01 NOTE — PROGRESS NOTES
Daily Treatment Note    Date: 2021  Patient Name: Brie Gomez  : 1955   MRN: [id number]  [facility]: 2020   DOSx: 21    Referring Provider:   [address] [place] [address]., [facility], [address]       Medical Diagnosis:    Diagnosis Orders   1. Achilles tendonosis of left lower extremity       WB Precaution: WBAT with CAM boot on for 1 more week until f/u apt. Outcome Measure: LEFS: 69% impaired on eval.     S: Patient has minimal pain. O:   Time 7947-5632     Visit 6 Repeat outcome measure at mid point and end. Pain 0/10     ROM See eval     Modalities         MO   Manual            Stretch      Dorsiflexion Stretch    TE   Wall push   TE   Soleus stretch on wall   TE   Plantar fascia stretch on wall   TE   Gastroc stretch with foot on wall   TE         Exercise       Bike L8 10 min Seat 6 TE   Dorsiflexion   TE   Plantarflexion   TE   Inversion/Eversion   TE   Ankle Alphabet  TE   Ankle Circles             Ankle Rocker      BAPS board 20 reps fwd/bwd, side to side, cwise, ccwise L5 standing TE   Marching  25 reps x 1 set Airex  TE   Standing Hip Abduction 25 reps x 1 set \" TE   Standing HS Curls 25 reps x 1 set \" TE   Toe Raises  25 reps x 1 set  \" TE   Reach and Touch   >  TE   SLS      Squats      Lunges      Eccentric Heel Tap 6\" 20 reps x 2   TE   Luxembourg Split Squat      Single Leg Italian Dead Lift      Sit to Stands 20 reps x 2 sets  TA   TG Squats 2 legs 20 reps x 3 sets  TA   TG Squats Toe Raises  20 reps x 3 sets  TE   Leg Press 1 leg   TA   Stairs - FWB   TA   Stairs - LAT   TA   Stairs - BWD      Calf Raises 20 reps x 1 set  Attempted eccentric calf raise and was unable to perform, progress as able TE   Seated Calf Raises on Knee Ext Machine            Marching Gait 45 ft x 2  NR   Side Stepping      Karaoke       Hopping            A: Improved tolerance with no pain following therapy session.    P: Continue with rehab plan  [address] [place] [address], [facility]

## 2021-09-10 ENCOUNTER — TREATMENT (OUTPATIENT)
Dept: PHYSICAL THERAPY | Age: 66
End: 2021-09-10
Payer: COMMERCIAL

## 2021-09-10 DIAGNOSIS — M67.88 ACHILLES TENDONOSIS OF LEFT LOWER EXTREMITY: Primary | ICD-10-CM

## 2021-09-10 PROCEDURE — 97110 THERAPEUTIC EXERCISES: CPT | Performed by: PHYSICAL THERAPIST

## 2021-09-10 NOTE — PROGRESS NOTES
Daily Treatment Note    Date: 9/10/2021  Patient Name: Ervin Sol  : 1955   MRN: [id number]  [facility]: 2020   DOSx: 21    Referring Provider:   Concepción Pendleton., SUZETTE  [address], [address]       Medical Diagnosis:    Diagnosis Orders   1. Achilles tendonosis of left lower extremity         Outcome Measure: LEFS: 69% impaired on eval.     S: Patient stated that he worked for limited hours earlier today and is feeling pretty good. For the most part, the pt has no pain and at times mild pain with exercise. O:   Time [id number]     Visit 7 Repeat outcome measure at mid point and end. Pain 2/10     ROM See eval     Modalities         MO   Manual            Stretch      Dorsiflexion Stretch    TE   Wall push   TE   Soleus stretch on wall   TE   Plantar fascia stretch on wall   TE   Gastroc stretch with foot on wall   TE         Exercise       Bike L8 10 min Seat 6 TE   Dorsiflexion   TE   Plantarflexion   TE   Inversion/Eversion   TE   Ankle Alphabet  TE   Ankle Circles             Ankle Rocker      BAPS board 20 reps fwd/bwd, side to side, cwise, ccwise L5 standing TE   Marching  Airex  TE   Standing Hip Abduction \" TE   Standing HS Curls \" TE   Toe Raises  \" TE   Reach and Touch   20 reps x 2 sets NEXT Low step High step TE   SLS NEXT     Split Squats NEXT     Lunges      Eccentric Heel Tap 7\" 20 reps x 2   TE   Lux[place]bourg Split Squat      Single Leg Nigerien Dead Lift      Sit to Stands 20 reps x 2 sets  TA   TG Squats 2 legs  TA   TG Squats Toe Raises   TE   Leg Press 1 leg   TA   Stairs - FWB   TA   Stairs - LAT   TA   Stairs - BWD      Calf Raises on Step 20 reps x 2 sets   TE   Seated Calf Raises on Knee Ext Machine NEXT           Marching Gait   NR   Side Stepping      Karaoke       Hopping            A: Pt improving with higher level activities with his strength, ROM, and NM control.    P: Continue with rehab plan  Heather Lee PT     Treatment Charges: Mins Units   Initial Evaluation     Re-Evaluation     Ther Exercise         TE 40 3   Manual Therapy     MT     Ther Activities        TA     Gait Training          GT     Neuro Re-education NR     Modalities     Non-Billable Service Time     Other     Total Time/Units 40 3

## 2021-09-13 ENCOUNTER — TREATMENT (OUTPATIENT)
Dept: PHYSICAL THERAPY | Age: 66
End: 2021-09-13
Payer: COMMERCIAL

## 2021-09-13 DIAGNOSIS — M76.62 ACHILLES BURSITIS OF LEFT LOWER EXTREMITY: ICD-10-CM

## 2021-09-13 DIAGNOSIS — M67.88 ACHILLES TENDONOSIS OF LEFT LOWER EXTREMITY: Primary | ICD-10-CM

## 2021-09-13 PROCEDURE — 97110 THERAPEUTIC EXERCISES: CPT | Performed by: PHYSICAL THERAPIST

## 2021-09-13 NOTE — PROGRESS NOTES
Daily Treatment Note    Date: 2021  Patient Name: Jimbo Garcia  : 1955   MRN: [id number]  [facility]: 2020   DOSx: 21    Referring Provider:   Hafsa Saleh, SUZETTE [facility], [address]       Medical Diagnosis:    Diagnosis Orders   1. Achilles tendonosis of left lower extremity     2. Achilles bursitis of left lower extremity         Outcome Measure: LEFS: 69% impaired on eval.     S: Patient stated that his pain level was slightly up today with higher level activity. O:   Time [id number]     Visit 7 Repeat outcome measure at mid point and end. Pain 2-5/10     ROM See eval     Modalities         MO   Manual            Stretch      Dorsiflexion Stretch    TE   Wall push   TE   Soleus stretch on wall   TE   Plantar fascia stretch on wall   TE   Gastroc stretch with foot on wall   TE         Exercise       Bike L8 10 min Seat 6 TE   Dorsiflexion   TE   Plantarflexion   TE   Inversion/Eversion   TE   Ankle Alphabet  TE   Ankle Circles             Ankle Rocker      BAPS board 20 reps fwd/bwd, side to side, cwise, ccwise L5 standing LLE only  TE   Marching  Airex  TE   Standing Hip Abduction \" TE   Standing HS Curls \" TE   Toe Raises  \" TE   Reach and Touch   15 reps x 2 sets 7\" step  TE   SLS 5 reps x 1 set with 20s holds     SLS Mini Squat 10 reps x 2 sets      Split Squats 10 reps x 1 set each leg     Lunges      Eccentric Heel Tap   TE   Luxembourg Split Squat      Single Leg Portuguese Dead Lift      Sit to Stands 20 reps x 2 sets  TA   TG Squats 2 legs  TA   TG Squats Toe Raises   TE   Leg Press 1 leg   TA   Stairs - FWB   TA   Stairs - LAT   TA   Stairs - BWD      Calf Raises on Step 20 reps x 2 sets   TE   Seated Calf Raises on Knee Ext Machine NEXT           Marching Gait   NR   Side Stepping      Karaoke       Hopping            A: Pt improving with balance and strengthening activities.    P: Continue with rehab plan  Cielo Lopes PT     Treatment Charges: Mins Units   Initial Evaluation     Re-Evaluation     Ther Exercise         TE 40 3   Manual Therapy     MT     Ther Activities        TA     Gait Training          GT     Neuro Re-education NR     Modalities     Non-Billable Service Time     Other     Total Time/Units 40 3

## 2021-09-14 ENCOUNTER — OFFICE VISIT (OUTPATIENT)
Dept: PODIATRY | Age: 66
End: 2021-09-14

## 2021-09-14 VITALS — HEIGHT: 72 IN | WEIGHT: 212 LBS | BODY MASS INDEX: 28.71 KG/M2

## 2021-09-14 DIAGNOSIS — M67.88 ACHILLES TENDONOSIS OF LEFT LOWER EXTREMITY: Primary | ICD-10-CM

## 2021-09-14 PROCEDURE — 99024 POSTOP FOLLOW-UP VISIT: CPT | Performed by: PODIATRIST

## 2021-09-14 NOTE — PROGRESS NOTES
21     Adrianna Contreras    : 1955   Sex: male    Age: 63 y.o. Patient's PCP/Provider is:  Concepcion Ryan MD    Subjective:  Patient is seen today for follow-up regarding continued evaluation regarding Achilles tendon repair left lower extremity. Overall patient is doing well at this time with continued physical therapy. Patient has been increasing his daily activities and wearing his good supportive shoe gear as instructed. No other additional abnormalities noted. Chief Complaint   Patient presents with    Post-Op Check     left achilles        ROS:  Const: Positives and pertinent negatives as per HPI. Musculo: Denies symptoms other than stated above. Neuro: Denies symptoms other than stated above. Skin: Denies symptoms other than stated above. Current Medications:    Current Outpatient Medications:     atorvastatin (LIPITOR) 10 MG tablet, TAKE 1 TABLET BY MOUTH ONCE DAILY AS DIRECTED FOR 30 DAYS, Disp: , Rfl:     acetaminophen (TYLENOL) 500 MG tablet, Take 1,000 mg by mouth daily as needed for Pain, Disp: , Rfl:     diclofenac sodium (VOLTAREN) 1 % GEL, APPLY 2GRAMS TOPICALLY TO AFFECTED AREA 4 TIMES DAILY AS DIRECTED, Disp: , Rfl:     fenofibrate (TRICOR) 145 MG tablet, TAKE 1 TABLET BY MOUTH ONCE DAILY AS DIRECTED FOR 30 DAYS, Disp: , Rfl:     levothyroxine (SYNTHROID) 25 MCG tablet, TAKE 1 TABLET BY MOUTH ONCE DAILY AS DIRECTED FOR 30 DAYS, Disp: , Rfl:     Esomeprazole Magnesium (NEXIUM 24HR) 20 MG TBEC, Take 20 mg by mouth daily am, Disp: , Rfl:     Allergies:  No Known Allergies    Vitals:    21 0854   Weight: 212 lb (96.2 kg)   Height: 6' (1.829 m)       Exam:  Neurovascular status grossly intact left lower extremity. Surgical site well coapted without any issues noted posterior aspect left heel. Increased range of motion noted left ankle and subtalar joint. Minimal edematous issues noted posterior aspect left heel. No other abnormalities noted.     Diagnostic

## 2021-09-15 ENCOUNTER — TREATMENT (OUTPATIENT)
Dept: PHYSICAL THERAPY | Age: 66
End: 2021-09-15
Payer: COMMERCIAL

## 2021-09-15 DIAGNOSIS — M67.88 ACHILLES TENDONOSIS OF LEFT LOWER EXTREMITY: Primary | ICD-10-CM

## 2021-09-15 DIAGNOSIS — M76.62 ACHILLES BURSITIS OF LEFT LOWER EXTREMITY: ICD-10-CM

## 2021-09-15 PROCEDURE — 97110 THERAPEUTIC EXERCISES: CPT | Performed by: PHYSICAL THERAPIST

## 2021-09-15 NOTE — PROGRESS NOTES
Daily Treatment Note    Date: 9/15/2021  Patient Name: Johnson Aguayo  : 1955   MRN: [id number]  [facility]: 2020   DOSx: 21    Referring Provider:   SUZETTE Cole Nidia [address] [facility], [address]       Medical Diagnosis:    Diagnosis Orders   1. Achilles tendonosis of left lower extremity     2. Achilles bursitis of left lower extremity         Outcome Measure: LEFS: 69% impaired on eval.     S: Patient stated that he was sore the day after therapy but that he did not have any pain at the start of the session. O:   Time [id number]     Visit 9 Repeat outcome measure at mid point and end.     Pain 0/10     ROM See eval     Modalities         MO   Manual            Stretch      Dorsiflexion Stretch    TE   Wall push   TE   Soleus stretch on wall   TE   Plantar fascia stretch on wall   TE   Gastroc stretch with foot on wall   TE         Exercise       Bike L8 10 min Seat 6 TE   Dorsiflexion   TE   Plantarflexion   TE   Inversion/Eversion   TE   Ankle Alphabet  TE   Ankle Circles             Ankle Rocker      BAPS board 20 reps fwd/bwd, side to side, cwise, ccwise L5 standing LLE only  TE   Marching  Airex  TE   Standing Hip Abduction \" TE   Standing HS Curls \" TE   Toe Raises  \" TE   Reach and Touch   15 reps x 2 sets 7\" step  TE   SLS      SLS Mini Squat 15 reps x 2 sets each leg     Split Squats      Lunges      Eccentric Heel Tap 7\" 20 reps x 2   TE   Luxembourg Split Squat 10 reps x 1 set     Single Leg Yakut Dead Lift      Sit to [employer] 20 reps x 2 sets  TA   TG Squats 2 legs  TA   TG Squats Toe Raises   TE   Leg Press 1 leg   TA   Stairs - FWB   TA   Stairs - LAT   TA   Stairs - BWD      Calf Raises on Step 15 reps x 2 sets   TE   Seated Calf Raises on Knee Ext Machine 50# 15 reps x 2 sets           Marching Gait   NR   Side Stepping      Karaoke       Hopping            A: Pt improving strength and NM control with exercises with mild pain that is subsiding following exercising.    P: Continue with rehab plan  Edelmira Vega PT     Treatment Charges: Mins Units   Initial Evaluation     Re-Evaluation     Ther Exercise         TE 40 3   Manual Therapy     MT     Ther Activities        TA     Gait Training          GT     Neuro Re-education NR     Modalities     Non-Billable Service Time     Other     Total Time/Units 40 3

## 2021-09-20 ENCOUNTER — TREATMENT (OUTPATIENT)
Dept: PHYSICAL THERAPY | Age: 66
End: 2021-09-20
Payer: COMMERCIAL

## 2021-09-20 DIAGNOSIS — M67.88 ACHILLES TENDONOSIS OF LEFT LOWER EXTREMITY: Primary | ICD-10-CM

## 2021-09-20 DIAGNOSIS — M76.62 ACHILLES BURSITIS OF LEFT LOWER EXTREMITY: ICD-10-CM

## 2021-09-20 PROCEDURE — 97110 THERAPEUTIC EXERCISES: CPT

## 2021-09-20 NOTE — PROGRESS NOTES
Daily Treatment Note    Date: 2021  Patient Name: Ervin Sol  : 1955   MRN: [id number]  [facility]: 2020   DOSx: 21    Referring Provider:   [address] [place] [address]., DPM  [facility], [address]       Medical Diagnosis:    Diagnosis Orders   1. Achilles tendonosis of left lower extremity     2. Achilles bursitis of left lower extremity         Outcome Measure: LEFS: 69% impaired on eval.     S: Patient reports feeling good. O:   Time 8886-1968     Visit 10 Repeat outcome measure at mid point and end. Pain 0/10     ROM See eval     Modalities         MO   Manual            Stretch      Dorsiflexion Stretch    TE   Wall push   TE   Soleus stretch on wall   TE   Plantar fascia stretch on wall   TE   Gastroc stretch with foot on wall   TE         Exercise       Bike L8 10 min Seat 6 TE   Dorsiflexion   TE   Plantarflexion   TE   Inversion/Eversion   TE   Ankle Alphabet  TE   Ankle Circles             Ankle Rocker      BAPS board 20 reps fwd/bwd, side to side, cwise, ccwise L5 standing LLE only  TE   Marching  Airex  TE   Standing Hip Abduction \" TE   Standing HS Curls \" TE   Toe Raises  \" TE   Reach and Touch   15 reps x 2 sets 7\" step  TE   SLS      SLS Mini Squat 15 reps x 2 sets each leg     Split Squats      Lunges      Eccentric Heel Tap 7\" 20 reps x 2   TE   Luxembourg Split Squat 10 reps x 1 set each     Single Leg Tristanian Dead Lift      Sit to Stands 20 reps x 2 sets  TA   TG Squats 2 legs  TA   TG Squats Toe Raises   TE   Leg Press 1 leg   TA   Stairs - FWB   TA   Stairs - LAT   TA   Stairs - BWD      Calf Raises on Step 15 reps x 2 sets   TE   Seated Calf Raises on Knee Ext Machine 50# 15 reps x 2 sets           Marching Gait   NR   Side Stepping      Karaoke       Hopping            A: Pt improving strength and NM control with exercises with mild pain that is subsiding following exercising. P: Continue with rehab plan.  Discussed with patient posibility of discharge next week.    Nicole Gonzalez, DENICE     Treatment Charges: Mins Units   Initial Evaluation     Re-Evaluation     Ther Exercise         TE 40 3   Manual Therapy     MT     Ther Activities        TA     Gait Training          GT     Neuro Re-education NR     Modalities     Non-Billable Service Time     Other     Total Time/Units 40 3

## 2021-09-23 ENCOUNTER — TREATMENT (OUTPATIENT)
Dept: PHYSICAL THERAPY | Age: 66
End: 2021-09-23
Payer: COMMERCIAL

## 2021-09-23 DIAGNOSIS — M67.88 ACHILLES TENDONOSIS OF LEFT LOWER EXTREMITY: Primary | ICD-10-CM

## 2021-09-23 DIAGNOSIS — M76.62 ACHILLES BURSITIS OF LEFT LOWER EXTREMITY: ICD-10-CM

## 2021-09-23 PROCEDURE — 97110 THERAPEUTIC EXERCISES: CPT

## 2021-09-23 NOTE — PROGRESS NOTES
Daily Treatment Note    Date: 2021  Patient Name: Elaine Emmanuel  : 1955   MRN: [id number]  [facility]: 2020   DOSx: 21    Referring Provider:   Karrie Ramirez, SUZETTE  [facility] [address] [place] [address], [address]       Medical Diagnosis:    Diagnosis Orders   1. Achilles tendonosis of left lower extremity     2. Achilles bursitis of left lower extremity         Outcome Measure: LEFS: 69% impaired on eval.     S: Patient reports in this past week, he has had no occurrence of ankle pain and feeling real good. However, his left knee gets sharp pain with kneeling and stair climbing. O:   Time 2431-0830     Visit 10 Repeat outcome measure at mid point and end.     Pain 0/10     ROM See eval     Modalities         MO   Manual            Stretch      Dorsiflexion Stretch    TE   Wall push   TE   Soleus stretch on wall   TE   Plantar fascia stretch on wall   TE   Gastroc stretch with foot on wall   TE         Exercise       Bike L8 10 min Seat 6 TE   Dorsiflexion   TE   Plantarflexion   TE   Inversion/Eversion   TE   Ankle Alphabet  TE   Ankle Circles             Ankle Rocker      BAPS board 20 reps fwd/bwd, side to side, cwise, ccwise L5 standing LLE only  TE   Marching  Airex  TE   Standing Hip Abduction \" TE   Standing HS Curls \" TE   Toe Raises  \" TE   Reach and Touch   15 reps x 2 sets 7\" step  TE   SLS      SLS Mini Squat 15 reps x 2 sets each leg     Split Squats      Lunges      Eccentric Heel Tap 7\" 20 reps x 2   TE   Luxembourg Split Squat 10 reps x 1 set each     Single Leg Romansh Dead Lift      Sit to Stands 20 reps x 2 sets  TA   TG Squats 2 legs  TA   TG Squats Toe Raises   TE   Leg Press 1 leg   TA   Stairs - FWB   TA   Stairs - LAT   TA   Stairs - BWD      Calf Raises on Step 15 reps x 2 sets   TE   Seated Calf Raises on Knee Ext Machine 50# 15 reps x 2 sets           Marching Gait   NR   Side Stepping      Karaoke       Hopping            A: Pt improving strength and NM

## 2021-09-28 ENCOUNTER — OFFICE VISIT (OUTPATIENT)
Dept: PODIATRY | Age: 66
End: 2021-09-28

## 2021-09-28 ENCOUNTER — TREATMENT (OUTPATIENT)
Dept: PHYSICAL THERAPY | Age: 66
End: 2021-09-28
Payer: COMMERCIAL

## 2021-09-28 VITALS — BODY MASS INDEX: 28.71 KG/M2 | HEIGHT: 72 IN | WEIGHT: 212 LBS

## 2021-09-28 DIAGNOSIS — M76.62 ACHILLES BURSITIS OF LEFT LOWER EXTREMITY: ICD-10-CM

## 2021-09-28 DIAGNOSIS — M67.88 ACHILLES TENDONOSIS OF LEFT LOWER EXTREMITY: Primary | ICD-10-CM

## 2021-09-28 PROCEDURE — 99024 POSTOP FOLLOW-UP VISIT: CPT | Performed by: PODIATRIST

## 2021-09-28 PROCEDURE — 97530 THERAPEUTIC ACTIVITIES: CPT | Performed by: PHYSICAL THERAPIST

## 2021-09-28 NOTE — PROGRESS NOTES
21     Edd Meyers    : 1955   Sex: male    Age: 63 y.o. Patient's PCP/Provider is:  Jane Lan MD    Subjective:  Patient is seen today for follow-up regarding continued postoperative evaluation regarding Achilles tendon repair left lower extremity. Patient feels like he is 95% improved at this time with current therapies performed. She has been back into his regular shoe gear and does utilize his compression garments as instructed. He denies any additional issues at this time. Chief Complaint   Patient presents with    Post-Op Check     left achilles        ROS:  Const: Positives and pertinent negatives as per HPI. Musculo: Denies symptoms other than stated above. Neuro: Denies symptoms other than stated above. Skin: Denies symptoms other than stated above. Current Medications:    Current Outpatient Medications:     atorvastatin (LIPITOR) 10 MG tablet, TAKE 1 TABLET BY MOUTH ONCE DAILY AS DIRECTED FOR 30 DAYS, Disp: , Rfl:     acetaminophen (TYLENOL) 500 MG tablet, Take 1,000 mg by mouth daily as needed for Pain, Disp: , Rfl:     diclofenac sodium (VOLTAREN) 1 % GEL, APPLY 2GRAMS TOPICALLY TO AFFECTED AREA 4 TIMES DAILY AS DIRECTED, Disp: , Rfl:     fenofibrate (TRICOR) 145 MG tablet, TAKE 1 TABLET BY MOUTH ONCE DAILY AS DIRECTED FOR 30 DAYS, Disp: , Rfl:     levothyroxine (SYNTHROID) 25 MCG tablet, TAKE 1 TABLET BY MOUTH ONCE DAILY AS DIRECTED FOR 30 DAYS, Disp: , Rfl:     Esomeprazole Magnesium (NEXIUM 24HR) 20 MG TBEC, Take 20 mg by mouth daily am, Disp: , Rfl:     Allergies:  No Known Allergies    Vitals:    21 0932   Weight: 212 lb (96.2 kg)   Height: 6' (1.829 m)       Exam:  Neurovascular status unchanged. Surgical site healed posterior aspect left lower extremity. Increased range of motion noted left ankle and subtalar joint. Adequate muscle strength noted left lower extremity. Stable gait noted upon evaluation.     Diagnostic Studies:     No results found.      Procedures:    None    Plan Per Assessment  Yancey Severs was seen today for post-op check. Diagnoses and all orders for this visit:    Achilles tendonosis of left lower extremity      1. Postoperative evaluation and management  2. We did advise patient to return to work activities without restrictions. Patient is to continue wearing his graduated compression garment on a daily basis with work activities as he progresses to his normal work activities. 3. Patient will be followed up in 3 weeks time or sooner if needed for reevaluation. He was advised to call the office with any questions or concerns in the interim. Seen By:    Kailey Tavarez DPM    Electronically signed by Kailey Tavarez DPM on 9/28/2021 at 9:46 AM    This note was created using voice recognition software. The note was reviewed however may contain grammatical errors.

## 2021-09-28 NOTE — PROGRESS NOTES
Daily Treatment Note    Date: 2021  Patient Name: Noman Bone  : 1955   MRN: [id number]  [facility]: 2020   DOSx: 21    Referring Provider:   SUZETTE [place] [address] [facility], [address]       Medical Diagnosis:    Diagnosis Orders   1. Achilles tendonosis of left lower extremity     2. Achilles bursitis of left lower extremity         Outcome Measure: LEFS: 69% impaired on eval.     S: Patient stated that he had no pain during session and that he feels good overall. O:   Time 0022-1849     Visit 11 Repeat outcome measure at mid point and end.     Pain 0/10     ROM See eval     Modalities         MO   Manual            Stretch      Dorsiflexion Stretch    TE   Wall push   TE   Soleus stretch on wall   TE   Plantar fascia stretch on wall   TE   Gastroc stretch with foot on wall   TE         Exercise       Bike  Elliptical NEXT L8 10 min Seat 6 TE   Dorsiflexion   TE   Plantarflexion   TE   Inversion/Eversion   TE   Ankle Alphabet  TE   Ankle Circles             Ankle Rocker      BAPS board 20 reps fwd/bwd, side to side, cwise, ccwise L5 standing LLE only  TE   Marching  15 reps x 1 set on Bosu TE   Standing Hip Abduction 15 reps x 1 set on Bosu TE   Standing HS Curls 15 reps x 1 set on Bosu TE   Toe Raises  15 reps x 1 set on Bosu TE   Reach and Touch   15 reps x 2 sets 7\" step On foam TE   SLS      SLS Mini Squat 15 reps x 2 sets each leg     Split Squats      Lunges      Eccentric Heel Tap 7\" 20 reps x 2   TE   [facility] Split Squat      Single Leg [place] Dead Lift      Sit to Stands   TA   TG Squats 2 legs  TA   TG Squats Toe Raises   TE   Leg Press 1 leg   TA   Stairs - FWB   TA   Stairs - LAT   TA   Stairs - BWD      Calf Raises on Step 15 reps x 2 sets  NEXT with weights TE   Seated Calf Raises on Knee Ext Machine 100# 20 reps x 2 sets           Marching Gait   NR   Side Stepping      Karaoke       Hopping            A: Pt potentially will d/c from therapy service d/t improvement of strength, ROM, and decrease in pain. Pt to see Dr. Chidi De La Cruz following session about need for more therapy for L ankle. P: Continue with rehab plan. Discussed with patient posibility of being discharged next week.    Celia Cali, PT     Treatment approval # [id number]  [id number]   44/72 units  [id number]   48 units  [id number]   4/72 units  [id number]   24 units  [id number]   24 units  [id number] to 10/27/2021    Treatment Charges: Mins Units   Initial Evaluation     Re-Evaluation     Ther Exercise         TE     Manual Therapy     MT     Ther Activities        TA 40 3   Gait Training          GT     Neuro Re-education NR     Modalities     Non-Billable Service Time     Other     Total Time/Units 40 3

## 2021-09-28 NOTE — LETTER
[address] [place], [place] [address]  Phone: [phone]  Fax: [address], [place]        September 28, 2021     Patient: Pixie Dakin   YOB: 1955   Date of Visit: 9/28/2021       To Whom it May Concern:    Ramesh Montoya was seen in my clinic on 9/28/2021. He may return to work without any restrictions starting 9/29/2021. If you have any questions or concerns, please don't hesitate to call.     Sincerely,         Toni Allen DPM

## 2021-09-28 NOTE — PROGRESS NOTES
Patient is in today for 2 week post op left achilles. Patient says PT is going well. He is still having some swelling and soreness. But overall doing well.  pcp is Shandra Barone MD  Last ov 6/29/21

## 2021-10-19 ENCOUNTER — OFFICE VISIT (OUTPATIENT)
Dept: PODIATRY | Age: 66
End: 2021-10-19
Payer: COMMERCIAL

## 2021-10-19 VITALS — HEIGHT: 72 IN | WEIGHT: 212 LBS | BODY MASS INDEX: 28.71 KG/M2

## 2021-10-19 DIAGNOSIS — M67.88 ACHILLES TENDONOSIS OF LEFT LOWER EXTREMITY: Primary | ICD-10-CM

## 2021-10-19 PROBLEM — R60.0 LOCALIZED EDEMA: Status: RESOLVED | Noted: 2021-01-26 | Resolved: 2021-10-19

## 2021-10-19 PROBLEM — M25.572 PAIN IN LEFT ANKLE AND JOINTS OF LEFT FOOT: Status: RESOLVED | Noted: 2021-01-26 | Resolved: 2021-10-19

## 2021-10-19 PROBLEM — M76.62 ACHILLES BURSITIS OF LEFT LOWER EXTREMITY: Status: RESOLVED | Noted: 2021-01-26 | Resolved: 2021-10-19

## 2021-10-19 PROBLEM — R26.2 DIFFICULTY WALKING: Status: RESOLVED | Noted: 2021-01-26 | Resolved: 2021-10-19

## 2021-10-19 PROCEDURE — 99213 OFFICE O/P EST LOW 20 MIN: CPT | Performed by: PODIATRIST

## 2021-10-19 NOTE — PROGRESS NOTES
10/19/21     Ravi Landaverde    : 1955   Sex: male    Age: 63 y.o. Patient's PCP/Provider is:  Alonso Morgan MD    Subjective:  Patient is seen today for follow-up regarding continued evaluation regarding Achilles repair left lower extremity. Patient is back to work and doing well with current activities and shoes. He still gets some tightness to the back of the heel without pain or discomfort. No other additional issues noted. Chief Complaint   Patient presents with    Follow-up     3 week left achilles (sx 21)       ROS:  Const: Positives and pertinent negatives as per HPI. Musculo: Denies symptoms other than stated above. Neuro: Denies symptoms other than stated above. Skin: Denies symptoms other than stated above. Current Medications:    Current Outpatient Medications:     atorvastatin (LIPITOR) 10 MG tablet, TAKE 1 TABLET BY MOUTH ONCE DAILY AS DIRECTED FOR 30 DAYS, Disp: , Rfl:     acetaminophen (TYLENOL) 500 MG tablet, Take 1,000 mg by mouth daily as needed for Pain, Disp: , Rfl:     diclofenac sodium (VOLTAREN) 1 % GEL, APPLY 2GRAMS TOPICALLY TO AFFECTED AREA 4 TIMES DAILY AS DIRECTED, Disp: , Rfl:     fenofibrate (TRICOR) 145 MG tablet, TAKE 1 TABLET BY MOUTH ONCE DAILY AS DIRECTED FOR 30 DAYS, Disp: , Rfl:     levothyroxine (SYNTHROID) 25 MCG tablet, TAKE 1 TABLET BY MOUTH ONCE DAILY AS DIRECTED FOR 30 DAYS, Disp: , Rfl:     Esomeprazole Magnesium (NEXIUM 24HR) 20 MG TBEC, Take 20 mg by mouth daily am, Disp: , Rfl:     Allergies:  No Known Allergies    Vitals:    10/19/21 0855   Weight: 212 lb (96.2 kg)   Height: 6' (1.829 m)       Exam:  NVS grossly intact left foot. Surgical well healed posterior left heel. Adequate ROM left ankle, STJ, and MTPJ's left lower extremity. Improved gait noted upon evaluation. Diagnostic Studies:     No results found. Procedures:    None    Plan Per Assessment  Lesa Brown was seen today for follow-up.     Diagnoses and all orders for this visit:    Achilles tendonosis of left lower extremity      1. Evaluation and management  2. Discussed continued use of his good supportive shoes and insoles with everyday activities. 3. Patient is to continue with his daily stretching and compression garments. 4. Patient will be followed up if any additional Podiatric issues arise. Seen By:    Carlitos Thompson DPM    Electronically signed by Carlitos Thompson DPM on 10/19/2021 at 12:44 PM    This note was created using voice recognition software. The note was reviewed however may contain grammatical errors.

## 2021-10-19 NOTE — PROGRESS NOTES
Patient is in today for 3 week follow up of left achilles. Patient says he is not having any pain, just some tightness.  pcp is Jean Claude Montoya MD  Last ov 6/29/21

## 2021-11-01 DIAGNOSIS — M25.562 LEFT KNEE PAIN, UNSPECIFIED CHRONICITY: Primary | ICD-10-CM

## 2021-11-02 ENCOUNTER — OFFICE VISIT (OUTPATIENT)
Dept: ORTHOPEDIC SURGERY | Age: 66
End: 2021-11-02
Payer: COMMERCIAL

## 2021-11-02 VITALS — BODY MASS INDEX: 28.71 KG/M2 | WEIGHT: 212 LBS | TEMPERATURE: 98 F | HEIGHT: 72 IN

## 2021-11-02 DIAGNOSIS — M17.12 PRIMARY OSTEOARTHRITIS OF LEFT KNEE: Primary | ICD-10-CM

## 2021-11-02 PROCEDURE — 20610 DRAIN/INJ JOINT/BURSA W/O US: CPT | Performed by: ORTHOPAEDIC SURGERY

## 2021-11-02 PROCEDURE — 99203 OFFICE O/P NEW LOW 30 MIN: CPT | Performed by: ORTHOPAEDIC SURGERY

## 2021-11-02 RX ORDER — TRIAMCINOLONE ACETONIDE 40 MG/ML
40 INJECTION, SUSPENSION INTRA-ARTICULAR; INTRAMUSCULAR ONCE
Status: COMPLETED | OUTPATIENT
Start: 2021-11-02 | End: 2021-11-02

## 2021-11-02 RX ADMIN — TRIAMCINOLONE ACETONIDE 40 MG: 40 INJECTION, SUSPENSION INTRA-ARTICULAR; INTRAMUSCULAR at 09:53

## 2021-11-02 NOTE — PROGRESS NOTES
Chief Complaint   Patient presents with    Knee Pain     Left knee pain up and down stairs. Very painful to kneel on knee. Was in boot for 8 months and started having knee issues afterwards. Subjective:     Patient ID: Moises Amaro is a 61 y.o..  male    Knee Pain  Patient complains of left knee pain. This is evaluated as a personal injury. There was not a history of injury. The pain began 4 months ago. The pain is located anterior, suprapatellar. He describes  His symptoms as aching, sharp, shooting and stabbing. He has experienced popping, clicking, locking, and giving way in the affected knee. The patient has had pain with kneeling, squating, and climbing stairs. Symptoms improve with rest, heat, ice, medication: Motrin, Tylenol used but not effective. The symptoms are worse with activity, stair climbing. The knee has not given out or felt unstable. The patient can bend and straighten the knee fully. The patient is active in none. Treatment to date has been ice, heat, Tylenol, NSAID's, without significant relief.     Past Medical History:   Diagnosis Date    Benign gastrointestinal stromal tumor (GIST)     Cancer ([facility] [address]     skin  squamos    Hyperlipidemia     Thyroid disease      Past Surgical History:   Procedure Laterality Date    COLECTOMY N/A 5/18/2021    ROBOTIC  LAPAROSCOPIC ASSISTED VENTRAL HERNIA REPAIR WITH MESH AND LYSIS OF ADHESIONS performed by Juan Miguel Cage MD at [facility] COLONOSCOPY      ENDOSCOPY, COLON, DIAGNOSTIC      HEEL SPUR SURGERY Left 7/1/2021    RESECTION RETROCALCANEAL EXOSTOSIS LEFT FOOT, REPAIR ACHILLES TENDON LEFT (CPT 48034) ([address]) performed by Rachael Haynes DPM at [address] Right 2020    SKIN BIOPSY Right     x2 - right ankle - skin cancer     SMALL INTESTINE SURGERY  1998       Current Outpatient Medications:     atorvastatin (LIPITOR) 10 MG tablet, TAKE 1 TABLET BY MOUTH ONCE DAILY AS DIRECTED FOR 30 DAYS, Disp: , Rfl:     acetaminophen (TYLENOL) 500 MG tablet, Take 1,000 mg by mouth daily as needed for Pain, Disp: , Rfl:     diclofenac sodium (VOLTAREN) 1 % GEL, APPLY 2GRAMS TOPICALLY TO AFFECTED AREA 4 TIMES DAILY AS DIRECTED, Disp: , Rfl:     fenofibrate (TRICOR) 145 MG tablet, TAKE 1 TABLET BY MOUTH ONCE DAILY AS DIRECTED FOR 30 DAYS, Disp: , Rfl:     levothyroxine (SYNTHROID) 25 MCG tablet, TAKE 1 TABLET BY MOUTH ONCE DAILY AS DIRECTED FOR 30 DAYS, Disp: , Rfl:     Esomeprazole Magnesium (NEXIUM 24HR) 20 MG TBEC, Take 20 mg by mouth daily am, Disp: , Rfl:   No Known Allergies  Social History     Socioeconomic History    Marital status:      Spouse name: Not on file    Number of children: Not on file    Years of education: Not on file    Highest education level: Not on file   Occupational History    Not on file   Tobacco Use    Smoking status: Former Smoker     Years: 20.00     Types: Cigarettes    Smokeless tobacco: Never Used   Vaping Use    Vaping Use: Never used   Substance and Sexual Activity    Alcohol use: Yes     Comment: social    Drug use: Never    Sexual activity: Not on file   Other Topics Concern    Not on file   Social History Narrative    Not on file     Social Determinants of Health     Financial Resource Strain:     Difficulty of Paying Living Expenses:    Food Insecurity:     Worried About Running Out of Food in the Last Year:     Ran Out of Food in the Last Year:    Transportation Needs:     Lack of Transportation (Medical):      Lack of Transportation (Non-Medical):    Physical Activity:     Days of Exercise per Week:     Minutes of Exercise per Session:    Stress:     Feeling of Stress :    Social Connections:     Frequency of Communication with Friends and Family:     Frequency of Social Gatherings with Friends and Family:     Attends Caodaism Services:     Active Member of Clubs or Organizations:     Attends Club or Organization Meetings:     Marital Status:    Intimate Partner Violence:     Fear of Current or Ex-Partner:     Emotionally Abused:     Physically Abused:     Sexually Abused:      Family History   Problem Relation Age of Onset    No Known Problems Mother     Diabetes Father     Cancer Father         liver + kidney + pancreatic     Alzheimer's Disease Father          REVIEW OF SYSTEMS:     General/Constitution:  (-)weight loss, (-)fever, (-)chills, (-)weakness. Skin: (-) rash,(-) psoriasis,(-) eczema, (-)skin cancer. Musculoskeletal: (-) fractures,  (-) dislocations,(-) collagen vascular disease, (-) fibromyalgia, (-) multiple sclerosis, (-) muscular dystrophy, (-) RSD,(-) joint pain (-)swelling, (-) joint pain,swelling. Neurologic: (-) epilepsy, (-)seizures,(-) brain tumor,(-) TIA, (-)stroke, (-)headaches, (-)Parkinson disease,(-) memory loss, (-) LOC. Cardiovascular: (-) Chest pain, (-) swelling in legs/feet, (-) SOB, (-) cramping in legs/feet with walking. Respiratory: (-) SOB, (-) Coughing, (-) night sweats. GI: (-) nausea, (-) vomiting, (-) diarrhea, (-) blood in stool, (-) gastric ulcer. Psychiatric: (-) Depression, (-) Anxiety, (-) bipolar disease, (-) Alzheimer's Disease  Allergic/Immunologic: (-) allergies latex, (-) allergies metal, (-) skin sensitivity. Hematlogic: (-) anemia, (-) blood transfusion, (-) DVT/PE, (-) Clotting disorders    Subjective:    Constitution:  Temp 98 °F (36.7 °C)   Ht 6' (1.829 m)   Wt 212 lb (96.2 kg)   BMI 28.75 kg/m²     Psycihatric:  The patient is alert and oriented x 3, appears to be stated age and in no distress. Respiratory:  Respiratory effort is not labored. Patient is not gasping. Palpation of the chest reveals no tactile fremitus. Skin:  Upon inspection: the skin appears warm, dry and intact. There is  a previous scar over the affected area. There is any cellulitis, lymphedema or cutaneous lesions noted in the lower extremities.    Upon palpation there is no induration noted. Neurologic:  Gait: antalgic; Motor exam of the lower extremities show ; quadriceps, hamstrings, foot dorsi and plantar flexors intact R.  5/5 and L. 5/5. Deep tendon reflexes are 2/4 at the knees and 2/4 at the ankles with strong extensor hallicus longus motor strength bilaterally. Sensory to both feet is intact to all sensory roots. Cardiovascular: The vascular exam is normal and is well perfused to distal extremities. Distal pulses DP/PT: R. 2+; L. 2+. There is cap refill noted less than two seconds in all digits. There is not edema of the bilateral lower extremities. There is not varicosities noted in the distal extremities. Lymph:  Upon palpation,  there is no lymphadenopathy noted in bilateral lower extremities. Musculoskeletal:  Gait: antalgic; examination of the nails and digits reveal no cyanosis or clubbing. Lumbar exam:  On visual inspection, there is not deformity of the spine. full range of motion, no tenderness, palpable spasm or pain on motion. Special tests: Straight Leg Raise negative, Suzette test negative. Hip exam:   Upon inspection, there is not deformity noted. Upon palpation there is not tenderness. ROM: is  full and symmetrical.   Strength: Hip Flexors 5/5; Hip Abductors 5/5; Hip Adduction 5/5. Knee exam:  Left knee exam shows;  range of motion of R. Knee is 0 to 120, and L. Knee is 0 to 120. The patient does have  pain on motion, effusion is mild, there is tenderness over the  medial, anterior region, there are not any masses, there is not ligamentous instability, there is not  deformity noted. Knee exam: left positive for moderate crepitations, some mild tenderness laxity is not noted with  stress.   There is a popliteal cyst.    R. Knee:  Lachman's negative, Anterior Drawer negative, Posterior Drawer negative  Prerna's negative, Thallasy  negative,   PF grind test negative, Apprehension test negative, Patellar J sign  negative  L. Knee:  Lachman's negative, Anterior Drawer negative, Posterior Drawer negative  Prerna's positive, Thallasy  positive,   PF grind test positive, Apprehension test negative,  Patellar J sign  negative    Xray Exam:  Tricompartmental osteoarthritis which is greatest at the patellofemoral   compartment where it is mild-to-moderate.           Radiographic findings reviewed with patient    Assessment:  Encounter Diagnoses   Name Primary?  Primary osteoarthritis of left knee Yes       Plan:  Natural history and expected course discussed. Questions answered. Educational materials distributed. Rest, ice, compression, and elevation (RICE) therapy. Reduction in offending activity. I had a lengthy discussion with the patient regarding their diagnosis. I explained treatment options including surgical vs non surgical treatment. I reviewed in detail the risks and benefits and outlined the procedure in detail with expected outcomes and possible complications. I also discussed non surgical treatment such as injections (CSI and visco supplementation), physical therapy, topical creams and NSAID's. They have elected for conservative management at this time. The skin was prepped with alcohol and betadine. A 60 ml syringe with a 18 gauge needle was inserted into the on left knee joint space. I retained 45mL of fluid. I will proceed with a cortisone injection in the Left knee. Verbal and written consent was obtained for the injections. The skin was prepped with alcohol. 1mL of Kenalog 40mg and 9mL of 0.25% Marcaine was  injected to Left knee. The injection was given through the lateral side of the knee. The patient tolerated the injection well.  I will see the patient back in 4 weeks

## 2021-12-06 ENCOUNTER — OFFICE VISIT (OUTPATIENT)
Dept: ORTHOPEDIC SURGERY | Age: 66
End: 2021-12-06
Payer: COMMERCIAL

## 2021-12-06 VITALS — TEMPERATURE: 98 F | HEIGHT: 72 IN | BODY MASS INDEX: 28.71 KG/M2 | WEIGHT: 212 LBS

## 2021-12-06 DIAGNOSIS — M17.11 PRIMARY OSTEOARTHRITIS OF RIGHT KNEE: ICD-10-CM

## 2021-12-06 DIAGNOSIS — M17.12 PRIMARY OSTEOARTHRITIS OF LEFT KNEE: Primary | ICD-10-CM

## 2021-12-06 PROCEDURE — 99213 OFFICE O/P EST LOW 20 MIN: CPT | Performed by: NURSE PRACTITIONER

## 2021-12-06 NOTE — PROGRESS NOTES
Chief Complaint   Patient presents with    Knee Pain     Left Knee F/U, had cortisone injection on 11/02/2021 with only a week of relief. Alen Tello returns today for follow-up of his bilateral knee pain. he reports this is unchanged than when I saw him last.  The patient's pain level is a 6/10. The previous treatment was cortisone to the left knee with relief for only 2 weeks.      Past Medical History:   Diagnosis Date    Benign gastrointestinal stromal tumor (GIST)     Cancer ([address]     skin  squamos    Hyperlipidemia     Thyroid disease      Past Surgical History:   Procedure Laterality Date    COLECTOMY N/A 5/18/2021    ROBOTIC  LAPAROSCOPIC ASSISTED VENTRAL HERNIA REPAIR WITH MESH AND LYSIS OF ADHESIONS performed by Fariba Russell MD at [facility] ENDOSCOPY, COLON, DIAGNOSTIC      HEEL SPUR SURGERY Left 7/1/2021    RESECTION RETROCALCANEAL EXOSTOSIS LEFT FOOT, REPAIR ACHILLES TENDON LEFT (CPT 11883) ([facility]) performed by Carlitos Law DPM at [address] Right 2020    SKIN BIOPSY Right     x2 - right ankle - skin cancer     SMALL INTESTINE SURGERY  1998       Current Outpatient Medications:     atorvastatin (LIPITOR) 10 MG tablet, TAKE 1 TABLET BY MOUTH ONCE DAILY AS DIRECTED FOR 30 DAYS, Disp: , Rfl:     acetaminophen (TYLENOL) 500 MG tablet, Take 1,000 mg by mouth daily as needed for Pain, Disp: , Rfl:     diclofenac sodium (VOLTAREN) 1 % GEL, APPLY 2GRAMS TOPICALLY TO AFFECTED AREA 4 TIMES DAILY AS DIRECTED, Disp: , Rfl:     fenofibrate (TRICOR) 145 MG tablet, TAKE 1 TABLET BY MOUTH ONCE DAILY AS DIRECTED FOR 30 DAYS, Disp: , Rfl:     levothyroxine (SYNTHROID) 25 MCG tablet, TAKE 1 TABLET BY MOUTH ONCE DAILY AS DIRECTED FOR 30 DAYS, Disp: , Rfl:     Esomeprazole Magnesium (NEXIUM 24HR) 20 MG TBEC, Take 20 mg by mouth daily am, Disp: , Rfl:   No Known Allergies  Social History     Socioeconomic History  Marital status:      Spouse name: Not on file    Number of children: Not on file    Years of education: Not on file    Highest education level: Not on file   Occupational History    Not on file   Tobacco Use    Smoking status: Former Smoker     Years: 20.00     Types: Cigarettes    Smokeless tobacco: Never Used   Vaping Use    Vaping Use: Never used   Substance and Sexual Activity    Alcohol use: Yes     Comment: social    Drug use: Never    Sexual activity: Not on file   Other Topics Concern    Not on file   Social History Narrative    Not on file     Social Determinants of Health     Financial Resource Strain:     Difficulty of Paying Living Expenses: Not on file   Food Insecurity:     Worried About Running Out of Food in the Last Year: Not on file    [place]omara of Food in the Last Year: Not on file   Transportation Needs:     Lack of Transportation (Medical): Not on file    Lack of Transportation (Non-Medical):  Not on file   Physical Activity:     Days of Exercise per Week: Not on file    Minutes of Exercise per Session: Not on file   Stress:     Feeling of Stress : Not on file   Social Connections:     Frequency of Communication with Friends and Family: Not on file    Frequency of Social Gatherings with Friends and Family: Not on file    Attends Jainism Services: Not on file    Active Member of [address] [place] [address] [employer] or Organizations: Not on file    Attends Club or Organization Meetings: Not on file    Marital Status: Not on file   Intimate Partner Violence:     Fear of Current or Ex-Partner: Not on file    Emotionally Abused: Not on file    Physically Abused: Not on file    Sexually Abused: Not on file   Housing Stability:     Unable to Pay for Housing in the Last Year: Not on file    Number of [place] in the Last Year: Not on file    Unstable Housing in the Last Year: Not on file     Family History   Problem Relation Age of Onset    No Known Problems Mother     Diabetes Father  Cancer Father         liver + kidney + pancreatic     Alzheimer's Disease Father        Review of Systems:     Skin: (-) rash,(-) psoriasis,(-) eczema, (-)skin cancer. Musculoskeletal: (-) fractures,  (-) dislocations,(-) collagen vascular disease, (-) fibromyalgia, (-) multiple sclerosis, (-) muscular dystrophy, (-) RSD,(-) joint pain (-)swelling, (-) joint pain,swelling. Neurologic: (-) epilepsy, (-)seizures,(-) brain tumor,(-) TIA, (-)stroke, (-)headaches, (-)Parkinson disease,(-) memory loss, (-) LOC. Cardiovascular: (-) Chest pain, (-) swelling in legs/feet, (-) SOB, (-) cramping in legs/feet with walking. Constitutional:  The patient is alert and oriented x 3, appears to be stated age and in no distress. Temp 98 °F (36.7 °C)   Ht 6' (1.829 m)   Wt 212 lb (96.2 kg)   BMI 28.75 kg/m²     Skin:  Upon inspection: the skin appears warm, dry and intact. There is not a previous scar over the affected area. There is not any cellulitis, lymphedema or cutaneous lesions noted in the lower extremities. Upon palpation there is no induration noted. Neurologic:  Gait: normal;  Motor exam of the lower extremities show ; quadriceps, hamstrings, foot dorsi and plantar flexors intact R.  5/5 and L. 5/5. Deep tendon reflexes are 2/4 at the knees and 2/4 at the ankles with strong extensor hallicus longus motor strength bilaterally. Sensory to both feet is intact to all sensory roots. Cardiovascular: The vascular exam is normal and is well perfused to distal extremities. Distal pulses DP/PT: R. 2+; L. 2+. There is cap refill noted less than two seconds in all digits. There is not edema of the bilateral lower extremities. There is not varicosities noted in the distal extremities. Lymph:  Upon palpation,  there is no lymphadenopathy noted in bilateral lower extremities.       Musculoskeletal:  Gait: antalgic; examination of the nails and digits reveal no cyanosis or clubbing    Lumbar exam:  On visual surgical treatment. I reviewed in detail the risks and benefits and outlined the procedure in detail with expected outcomes and possible complications. I also discussed non surgical treatment such as injections (CSI and visco supplementation), physical therapy, topical creams and NSAID's. They have elected for conservative management at this time. The patient has failed conservative measures such as NSAIDS, HEP, and cortisone injections. He is an excellent candidate for Zilretta injections  in the Bilateral knee.  We will contact the patient's insurance company and see them back in the office once we have received approval.

## 2021-12-28 ENCOUNTER — OFFICE VISIT (OUTPATIENT)
Dept: ORTHOPEDIC SURGERY | Age: 66
End: 2021-12-28
Payer: COMMERCIAL

## 2021-12-28 DIAGNOSIS — M17.11 PRIMARY OSTEOARTHRITIS OF RIGHT KNEE: ICD-10-CM

## 2021-12-28 DIAGNOSIS — M17.12 PRIMARY OSTEOARTHRITIS OF LEFT KNEE: Primary | ICD-10-CM

## 2021-12-28 PROCEDURE — 20610 DRAIN/INJ JOINT/BURSA W/O US: CPT | Performed by: ORTHOPAEDIC SURGERY

## 2021-12-28 NOTE — PROGRESS NOTES
Chief Complaint   Patient presents with    Injections     b/l zilretta        I will proceed with a cortisone injection in the Bilateral knee. Verbal and written consent was obtained for the injections. The skin was prepped with alcohol. A prepared mixture of 32 mg of Zilretta and 5mL diluent was injected to Bilateral knee. The injection was given through the lateral side of the knee. The patient tolerated the injection well. I will see the patient back prn. Love Juve was seen today for injections.     Diagnoses and all orders for this visit:    Primary osteoarthritis of left knee  -     [place] ARTHROCENTESIS ASPIR&/INJ MAJOR JT/BURSA W/O US    Primary osteoarthritis of right knee  -     [place] ARTHROCENTESIS ASPIR&/INJ MAJOR JT/BURSA W/O US    Other orders  -     triamcinolone acetonide (ZILRETTA) intra-articular injection 32 mg  -     triamcinolone acetonide (ZILRETTA) intra-articular injection 32 mg

## 2022-01-31 ENCOUNTER — OFFICE VISIT (OUTPATIENT)
Dept: ORTHOPEDIC SURGERY | Age: 67
End: 2022-01-31
Payer: COMMERCIAL

## 2022-01-31 VITALS — TEMPERATURE: 98 F | WEIGHT: 212 LBS | BODY MASS INDEX: 28.71 KG/M2 | HEIGHT: 72 IN

## 2022-01-31 DIAGNOSIS — M17.12 PRIMARY OSTEOARTHRITIS OF LEFT KNEE: ICD-10-CM

## 2022-01-31 DIAGNOSIS — M17.11 PRIMARY OSTEOARTHRITIS OF RIGHT KNEE: Primary | ICD-10-CM

## 2022-01-31 PROCEDURE — G8427 DOCREV CUR MEDS BY ELIG CLIN: HCPCS | Performed by: ORTHOPAEDIC SURGERY

## 2022-01-31 PROCEDURE — 3017F COLORECTAL CA SCREEN DOC REV: CPT | Performed by: ORTHOPAEDIC SURGERY

## 2022-01-31 PROCEDURE — G8484 FLU IMMUNIZE NO ADMIN: HCPCS | Performed by: ORTHOPAEDIC SURGERY

## 2022-01-31 PROCEDURE — 1036F TOBACCO NON-USER: CPT | Performed by: ORTHOPAEDIC SURGERY

## 2022-01-31 PROCEDURE — G8419 CALC BMI OUT NRM PARAM NOF/U: HCPCS | Performed by: ORTHOPAEDIC SURGERY

## 2022-01-31 PROCEDURE — 99212 OFFICE O/P EST SF 10 MIN: CPT | Performed by: ORTHOPAEDIC SURGERY

## 2022-01-31 NOTE — PROGRESS NOTES
Chief Complaint   Patient presents with    Knee Pain     Bilateral Knee F/U, had Zilretta injection on 12/28/2021 with good relief. Aleksandar Paniagua returns today for follow-up of his bilateral knee pain. he reports this is better than when I saw him last. approximately 80 % improved. The patient's pain level is a 1-2/10. The previous treatment was successful.     Past Medical History:   Diagnosis Date    Benign gastrointestinal stromal tumor (GIST)     Cancer ([address]     skin  squamos    Hyperlipidemia     Thyroid disease      Past Surgical History:   Procedure Laterality Date    COLECTOMY N/A 5/18/2021    ROBOTIC  LAPAROSCOPIC ASSISTED VENTRAL HERNIA REPAIR WITH MESH AND LYSIS OF ADHESIONS performed by Hiral Bhardwaj MD at [facility] ENDOSCOPY, COLON, DIAGNOSTIC      HEEL SPUR SURGERY Left 7/1/2021    RESECTION RETROCALCANEAL EXOSTOSIS LEFT FOOT, REPAIR ACHILLES TENDON LEFT (CPT 31479) (ARTHREX) performed by Desmond Oliveira., DPM at [address] Right 2020    SKIN BIOPSY Right     x2 - right ankle - skin cancer     SMALL INTESTINE SURGERY  1998       Current Outpatient Medications:     atorvastatin (LIPITOR) 10 MG tablet, TAKE 1 TABLET BY MOUTH ONCE DAILY AS DIRECTED FOR 30 DAYS, Disp: , Rfl:     acetaminophen (TYLENOL) 500 MG tablet, Take 1,000 mg by mouth daily as needed for Pain, Disp: , Rfl:     diclofenac sodium (VOLTAREN) 1 % GEL, APPLY 2GRAMS TOPICALLY TO AFFECTED AREA 4 TIMES DAILY AS DIRECTED, Disp: , Rfl:     fenofibrate (TRICOR) 145 MG tablet, TAKE 1 TABLET BY MOUTH ONCE DAILY AS DIRECTED FOR 30 DAYS, Disp: , Rfl:     levothyroxine (SYNTHROID) 25 MCG tablet, TAKE 1 TABLET BY MOUTH ONCE DAILY AS DIRECTED FOR 30 DAYS, Disp: , Rfl:     Esomeprazole Magnesium (NEXIUM 24HR) 20 MG TBEC, Take 20 mg by mouth daily am, Disp: , Rfl:   No Known Allergies  Social History     Socioeconomic History    Marital status:      Spouse name: Not on file    Number of children: Not on file    Years of education: Not on file    Highest education level: Not on file   Occupational History    Not on file   Tobacco Use    Smoking status: Former Smoker     Years: 20.00     Types: Cigarettes    Smokeless tobacco: Never Used   Vaping Use    Vaping Use: Never used   Substance and Sexual Activity    Alcohol use: Yes     Comment: social    Drug use: Never    Sexual activity: Not on file   Other Topics Concern    Not on file   Social History Narrative    Not on file     Social Determinants of Health     Financial Resource Strain:     Difficulty of Paying Living Expenses: Not on file   Food Insecurity:     Worried About Running Out of Food in the Last Year: Not on file    Xiomara of Food in the Last Year: Not on file   Transportation Needs:     Lack of Transportation (Medical): Not on file    Lack of Transportation (Non-Medical):  Not on file   Physical Activity:     Days of Exercise per Week: Not on file    Minutes of Exercise per Session: Not on file   Stress:     Feeling of Stress : Not on file   Social Connections:     Frequency of Communication with Friends and Family: Not on file    Frequency of Social Gatherings with Friends and Family: Not on file    Attends Christian Services: Not on file    Active Member of [address] [place] [address] [employer] or Organizations: Not on file    Attends Club or Organization Meetings: Not on file    Marital Status: Not on file   Intimate Partner Violence:     Fear of Current or Ex-Partner: Not on file    Emotionally Abused: Not on file    Physically Abused: Not on file    Sexually Abused: Not on file   Housing Stability:     Unable to Pay for Housing in the Last Year: Not on file    Number of [place] in the Last Year: Not on file    Unstable Housing in the Last Year: Not on file     Family History   Problem Relation Age of Onset    No Known Problems Mother     Diabetes Father     Cancer Father liver + kidney + pancreatic     Alzheimer's Disease Father        Review of Systems:     Skin: (-) rash,(-) psoriasis,(-) eczema, (-)skin cancer. Musculoskeletal: (-) fractures,  (-) dislocations,(-) collagen vascular disease, (-) fibromyalgia, (-) multiple sclerosis, (-) muscular dystrophy, (-) RSD,(-) joint pain (-)swelling, (-) joint pain,swelling. Neurologic: (-) epilepsy, (-)seizures,(-) brain tumor,(-) TIA, (-)stroke, (-)headaches, (-)Parkinson disease,(-) memory loss, (-) LOC. Cardiovascular: (-) Chest pain, (-) swelling in legs/feet, (-) SOB, (-) cramping in legs/feet with walking. Constitutional:  The patient is alert and oriented x 3, appears to be stated age and in no distress. Temp 98 °F (36.7 °C)   Ht 6' (1.829 m)   Wt 212 lb (96.2 kg)   BMI 28.75 kg/m²     Skin:  Upon inspection: the skin appears warm, dry and intact. There is not a previous scar over the affected area. There is not any cellulitis, lymphedema or cutaneous lesions noted in the lower extremities. Upon palpation there is no induration noted. Neurologic:  Gait: normal;  Motor exam of the lower extremities show ; quadriceps, hamstrings, foot dorsi and plantar flexors intact R.  5/5 and L. 5/5. Deep tendon reflexes are 2/4 at the knees and 2/4 at the ankles with strong extensor hallicus longus motor strength bilaterally. Sensory to both feet is intact to all sensory roots. Cardiovascular: The vascular exam is normal and is well perfused to distal extremities. Distal pulses DP/PT: R. 2+; L. 2+. There is cap refill noted less than two seconds in all digits. There is not edema of the bilateral lower extremities. There is not varicosities noted in the distal extremities. Lymph:  Upon palpation,  there is no lymphadenopathy noted in bilateral lower extremities.       Musculoskeletal:  Gait: normal; examination of the nails and digits reveal no cyanosis or clubbing    Lumbar exam:  On visual inspection, there is no deformity of the spine. full range of motion, no tenderness, palpable spasm or pain on motion. Special tests: Straight Leg Raise negative, Suzette testnegative. Hip exam:  Upon inspection, there is no deformity noted. Upon palpation there is not tenderness. ROM: is   full and semetrical.   Strength: Hip Flexors 5/5; Hip Abductors 5/5; Hip Adduction 5/5. Knee exam:  Bilateral knee exam shows;  range of motion of R. Knee is 0 to 125, and L. Knee is 0 to 125. He does not have  pain on motion, effusion is mild, there is not tenderness over the  lateral region, there are not any masses, there is not ligamentous instability, there is not  deformity noted. Knee exam: bilateral positive for moderate crepitations, some mild tenderness laxity is not noted with  stress. R. Knee:  Lachman's negative, Anterior Drawer negative, Posterior Drawer negative  Prerna's negative, Thallasy  negative,   PF grind test negative, Apprehension test negative, Patellar J sign  negative  L. Knee:  Lachman's negative, Anterior Drawer negative, Posterior Drawer negative  Prerna's negative, Thallasy  negative,   PF grind test negative, Apprehension test negative,  Patellar J sign  negative    Xrays:   Na/    MRI:   N/a   Radiographic findings reviewed with patient    Impression:  Encounter Diagnoses   Name Primary?  Primary osteoarthritis of right knee Yes    Primary osteoarthritis of left knee        Plan:   Natural history and expected course discussed. Questions answered. Educational materials distributed. Rest, ice, compression, and elevation (RICE) therapy. Reduction in offending activity. Patellar compression sleeve.   Fu prn

## 2023-03-16 ENCOUNTER — OFFICE VISIT (OUTPATIENT)
Dept: ORTHOPEDIC SURGERY | Age: 68
End: 2023-03-16

## 2023-03-16 VITALS — WEIGHT: 205 LBS | TEMPERATURE: 98 F | BODY MASS INDEX: 27.17 KG/M2 | HEIGHT: 73 IN

## 2023-03-16 DIAGNOSIS — M17.12 PRIMARY OSTEOARTHRITIS OF LEFT KNEE: ICD-10-CM

## 2023-03-16 DIAGNOSIS — M17.11 PRIMARY OSTEOARTHRITIS OF RIGHT KNEE: Primary | ICD-10-CM

## 2023-03-16 RX ORDER — METOPROLOL SUCCINATE 25 MG/1
25 TABLET, EXTENDED RELEASE ORAL DAILY
COMMUNITY

## 2023-03-16 NOTE — PROGRESS NOTES
Chief Complaint   Patient presents with    Knee Pain     F/u bilateral knee. Patient had zillretta injections 1/3/22  with great results. Patient states injections wore off 3 months ago. Shraddha Tafoya returns today for follow-up of his bilateral knee pain. he reports this is worse than when I saw him last.  The patient's pain level is a 7/10.    Past Medical History:   Diagnosis Date    Benign gastrointestinal stromal tumor (GIST)     Cancer ([address]     skin  squamos    Hyperlipidemia     Thyroid disease      Past Surgical History:   Procedure Laterality Date    COLECTOMY N/A 5/18/2021    ROBOTIC  LAPAROSCOPIC ASSISTED VENTRAL HERNIA REPAIR WITH MESH AND LYSIS OF ADHESIONS performed by Roney Reyes MD at [facility] , COLON, DIAGNOSTIC      HEEL SPUR SURGERY Left 7/1/2021    RESECTION RETROCALCANEAL EXOSTOSIS LEFT FOOT, REPAIR ACHILLES TENDON LEFT (CPT 28039) (ARTHREX) performed by Neil Bristol., DPM at [address] Right 2020    SKIN BIOPSY Right     x2 - right ankle - skin cancer     SMALL INTESTINE SURGERY  1998       Current Outpatient Medications:     metoprolol succinate (TOPROL XL) 25 MG extended release tablet, Take 25 mg by mouth daily, Disp: , Rfl:     atorvastatin (LIPITOR) 10 MG tablet, TAKE 1 TABLET BY MOUTH ONCE DAILY AS DIRECTED FOR 30 DAYS, Disp: , Rfl:     acetaminophen (TYLENOL) 500 MG tablet, Take 1,000 mg by mouth daily as needed for Pain, Disp: , Rfl:     diclofenac sodium (VOLTAREN) 1 % GEL, APPLY 2GRAMS TOPICALLY TO AFFECTED AREA 4 TIMES DAILY AS DIRECTED, Disp: , Rfl:     fenofibrate (TRICOR) 145 MG tablet, TAKE 1 TABLET BY MOUTH ONCE DAILY AS DIRECTED FOR 30 DAYS, Disp: , Rfl:     levothyroxine (SYNTHROID) 25 MCG tablet, TAKE 1 TABLET BY MOUTH ONCE DAILY AS DIRECTED FOR 30 DAYS, Disp: , Rfl:     Esomeprazole Magnesium (NEXIUM 24HR) 20 MG TBEC, Take 20 mg by mouth daily am, Disp: , Rfl:   No Known

## 2023-06-08 DIAGNOSIS — M17.11 PRIMARY OSTEOARTHRITIS OF RIGHT KNEE: Primary | ICD-10-CM

## 2023-06-19 ENCOUNTER — NURSE ONLY (OUTPATIENT)
Dept: ORTHOPEDIC SURGERY | Age: 68
End: 2023-06-19
Payer: COMMERCIAL

## 2023-06-19 DIAGNOSIS — M17.11 PRIMARY OSTEOARTHRITIS OF RIGHT KNEE: Primary | ICD-10-CM

## 2023-06-19 DIAGNOSIS — M17.12 PRIMARY OSTEOARTHRITIS OF LEFT KNEE: ICD-10-CM

## 2023-06-19 PROCEDURE — 20610 DRAIN/INJ JOINT/BURSA W/O US: CPT | Performed by: NURSE PRACTITIONER

## 2023-06-19 PROCEDURE — 99999 PR OFFICE/OUTPT VISIT,PROCEDURE ONLY: CPT | Performed by: NURSE PRACTITIONER

## 2023-06-19 NOTE — PROGRESS NOTES
Chief Complaint   Patient presents with    Injections     B/l  knee zilretta        I will proceed with a cortisone injection in the b/l  knee. Verbal and written consent was obtained for the injections. The skin was prepped with alcohol. A prepared mixture of 32 mg of Zilretta and 5mL diluent was injected to b/l  knee. The injection was given through the lateral side of the knee. The patient tolerated the injection well. I will see the patient back prn. Brynda Kayser was seen today for injections.     Diagnoses and all orders for this visit:    Primary osteoarthritis of right knee  -     20610 - [place] DRAIN/INJECT LARGE JOINT/BURSA  -     triamcinolone acetonide (ZILRETTA) intra-articular injection 32 mg    Primary osteoarthritis of left knee  -     20610 - [place] DRAIN/INJECT LARGE JOINT/BURSA    Other orders  -     triamcinolone acetonide (ZILRETTA) intra-articular injection 32 mg

## 2023-08-01 ENCOUNTER — HOSPITAL ENCOUNTER (EMERGENCY)
Age: 68
Discharge: HOME | End: 2023-08-01
Payer: MEDICARE

## 2023-08-01 ENCOUNTER — APPOINTMENT (OUTPATIENT)
Dept: CT IMAGING | Age: 68
End: 2023-08-01
Payer: MEDICARE

## 2023-08-01 VITALS
RESPIRATION RATE: 18 BRPM | TEMPERATURE: 97.7 F | OXYGEN SATURATION: 100 % | HEART RATE: 77 BPM | DIASTOLIC BLOOD PRESSURE: 89 MMHG | SYSTOLIC BLOOD PRESSURE: 176 MMHG

## 2023-08-01 DIAGNOSIS — R10.9 FLANK PAIN: ICD-10-CM

## 2023-08-01 DIAGNOSIS — R33.9 URINARY RETENTION: ICD-10-CM

## 2023-08-01 DIAGNOSIS — D35.02 ADENOMA OF LEFT ADRENAL GLAND: Primary | ICD-10-CM

## 2023-08-01 LAB
ALBUMIN SERPL-MCNC: 5.3 G/DL (ref 3.5–5.2)
ALP SERPL-CCNC: 57 U/L (ref 40–129)
ALT SERPL-CCNC: 32 U/L (ref 0–40)
ANION GAP SERPL CALCULATED.3IONS-SCNC: 10 MMOL/L (ref 7–16)
AST SERPL-CCNC: 23 U/L (ref 0–39)
BASOPHILS # BLD: 0.05 K/UL (ref 0–0.2)
BASOPHILS NFR BLD: 1 % (ref 0–2)
BILIRUB SERPL-MCNC: 0.5 MG/DL (ref 0–1.2)
BILIRUB UR QL STRIP: NEGATIVE
BUN SERPL-MCNC: 11 MG/DL (ref 6–23)
CALCIUM SERPL-MCNC: 10.5 MG/DL (ref 8.6–10.2)
CHLORIDE SERPL-SCNC: 104 MMOL/L (ref 98–107)
CLARITY UR: CLEAR
CO2 SERPL-SCNC: 27 MMOL/L (ref 22–29)
COLOR UR: YELLOW
CREAT SERPL-MCNC: 1 MG/DL (ref 0.7–1.2)
EOSINOPHIL # BLD: 0.11 K/UL (ref 0.05–0.5)
EOSINOPHILS RELATIVE PERCENT: 2 % (ref 0–6)
ERYTHROCYTE [DISTWIDTH] IN BLOOD BY AUTOMATED COUNT: 14.2 % (ref 11.5–15)
GFR SERPL CREATININE-BSD FRML MDRD: >60 ML/MIN/1.73M2
GLUCOSE SERPL-MCNC: 105 MG/DL (ref 74–99)
GLUCOSE UR STRIP-MCNC: NEGATIVE MG/DL
HCT VFR BLD AUTO: 48.7 % (ref 37–54)
HGB BLD-MCNC: 15.7 G/DL (ref 12.5–16.5)
HGB UR QL STRIP.AUTO: NEGATIVE
IMM GRANULOCYTES # BLD AUTO: 0.05 K/UL (ref 0–0.58)
IMM GRANULOCYTES NFR BLD: 1 % (ref 0–5)
KETONES UR STRIP-MCNC: NEGATIVE MG/DL
LACTATE BLDV-SCNC: 1.6 MMOL/L (ref 0.5–2.2)
LEUKOCYTE ESTERASE UR QL STRIP: NEGATIVE
LYMPHOCYTES NFR BLD: 1.22 K/UL (ref 1.5–4)
LYMPHOCYTES RELATIVE PERCENT: 22 % (ref 20–42)
MCH RBC QN AUTO: 28 PG (ref 26–35)
MCHC RBC AUTO-ENTMCNC: 32.2 G/DL (ref 32–34.5)
MCV RBC AUTO: 87 FL (ref 80–99.9)
MONOCYTES NFR BLD: 0.44 K/UL (ref 0.1–0.95)
MONOCYTES NFR BLD: 8 % (ref 2–12)
NEUTROPHILS NFR BLD: 66 % (ref 43–80)
NEUTS SEG NFR BLD: 3.63 K/UL (ref 1.8–7.3)
NITRITE UR QL STRIP: NEGATIVE
PH UR STRIP: 7 [PH] (ref 5–9)
PLATELET # BLD AUTO: 194 K/UL (ref 130–450)
PMV BLD AUTO: 10.2 FL (ref 7–12)
POTASSIUM SERPL-SCNC: 4 MMOL/L (ref 3.5–5)
PROT SERPL-MCNC: 7.9 G/DL (ref 6.4–8.3)
PROT UR STRIP-MCNC: NEGATIVE MG/DL
RBC # BLD AUTO: 5.6 M/UL (ref 3.8–5.8)
RBC #/AREA URNS HPF: NORMAL /HPF
SODIUM SERPL-SCNC: 141 MMOL/L (ref 132–146)
SP GR UR STRIP: 1.01 (ref 1–1.03)
UROBILINOGEN UR STRIP-ACNC: 0.2 EU/DL (ref 0–1)
WBC #/AREA URNS HPF: NORMAL /HPF
WBC OTHER # BLD: 5.5 K/UL (ref 4.5–11.5)

## 2023-08-01 PROCEDURE — 87086 URINE CULTURE/COLONY COUNT: CPT

## 2023-08-01 PROCEDURE — 36415 COLL VENOUS BLD VENIPUNCTURE: CPT

## 2023-08-01 PROCEDURE — 99285 EMERGENCY DEPT VISIT HI MDM: CPT

## 2023-08-01 PROCEDURE — 87591 N.GONORRHOEAE DNA AMP PROB: CPT

## 2023-08-01 PROCEDURE — 81001 URINALYSIS AUTO W/SCOPE: CPT

## 2023-08-01 PROCEDURE — 6360000004 HC RX CONTRAST MEDICATION: Performed by: RADIOLOGY

## 2023-08-01 PROCEDURE — 51702 INSERT TEMP BLADDER CATH: CPT

## 2023-08-01 PROCEDURE — 87491 CHLMYD TRACH DNA AMP PROBE: CPT

## 2023-08-01 PROCEDURE — 2580000003 HC RX 258: Performed by: PHYSICIAN ASSISTANT

## 2023-08-01 PROCEDURE — 74177 CT ABD & PELVIS W/CONTRAST: CPT

## 2023-08-01 PROCEDURE — 6360000002 HC RX W HCPCS: Performed by: PHYSICIAN ASSISTANT

## 2023-08-01 PROCEDURE — 96375 TX/PRO/DX INJ NEW DRUG ADDON: CPT

## 2023-08-01 PROCEDURE — 96374 THER/PROPH/DIAG INJ IV PUSH: CPT

## 2023-08-01 PROCEDURE — 80053 COMPREHEN METABOLIC PANEL: CPT

## 2023-08-01 PROCEDURE — 85025 COMPLETE CBC W/AUTO DIFF WBC: CPT

## 2023-08-01 PROCEDURE — 83605 ASSAY OF LACTIC ACID: CPT

## 2023-08-01 RX ORDER — HYDROCODONE BITARTRATE AND ACETAMINOPHEN 5; 325 MG/1; MG/1
1 TABLET ORAL EVERY 6 HOURS PRN
Qty: 12 TABLET | Refills: 0 | Status: SHIPPED | OUTPATIENT
Start: 2023-08-01 | End: 2023-08-04

## 2023-08-01 RX ORDER — 0.9 % SODIUM CHLORIDE 0.9 %
1000 INTRAVENOUS SOLUTION INTRAVENOUS ONCE
Status: COMPLETED | OUTPATIENT
Start: 2023-08-01 | End: 2023-08-01

## 2023-08-01 RX ORDER — MORPHINE SULFATE 4 MG/ML
4 INJECTION, SOLUTION INTRAMUSCULAR; INTRAVENOUS ONCE
Status: COMPLETED | OUTPATIENT
Start: 2023-08-01 | End: 2023-08-01

## 2023-08-01 RX ORDER — ONDANSETRON 2 MG/ML
4 INJECTION INTRAMUSCULAR; INTRAVENOUS ONCE
Status: COMPLETED | OUTPATIENT
Start: 2023-08-01 | End: 2023-08-01

## 2023-08-01 RX ADMIN — ONDANSETRON 4 MG: 2 INJECTION INTRAMUSCULAR; INTRAVENOUS at 11:34

## 2023-08-01 RX ADMIN — IOPAMIDOL 75 ML: 755 INJECTION, SOLUTION INTRAVENOUS at 12:25

## 2023-08-01 RX ADMIN — SODIUM CHLORIDE 1000 ML: 9 INJECTION, SOLUTION INTRAVENOUS at 11:34

## 2023-08-01 RX ADMIN — MORPHINE SULFATE 4 MG: 4 INJECTION, SOLUTION INTRAMUSCULAR; INTRAVENOUS at 11:34

## 2023-08-01 ASSESSMENT — PAIN SCALES - GENERAL: PAINLEVEL_OUTOF10: 8

## 2023-08-01 ASSESSMENT — PAIN - FUNCTIONAL ASSESSMENT: PAIN_FUNCTIONAL_ASSESSMENT: 0-10

## 2023-08-02 LAB
MICROORGANISM SPEC CULT: NO GROWTH
SPECIMEN DESCRIPTION: NORMAL

## 2023-08-03 LAB
CHLAMYDIA DNA UR QL NAA+PROBE: NEGATIVE
N GONORRHOEA DNA UR QL NAA+PROBE: NEGATIVE
SPECIMEN DESCRIPTION: NORMAL

## 2023-09-19 ENCOUNTER — OFFICE VISIT (OUTPATIENT)
Dept: ORTHOPEDIC SURGERY | Age: 68
End: 2023-09-19
Payer: MEDICARE

## 2023-09-19 DIAGNOSIS — M17.12 PRIMARY OSTEOARTHRITIS OF LEFT KNEE: ICD-10-CM

## 2023-09-19 DIAGNOSIS — M17.11 PRIMARY OSTEOARTHRITIS OF RIGHT KNEE: Primary | ICD-10-CM

## 2023-09-19 PROCEDURE — 99999 PR OFFICE/OUTPT VISIT,PROCEDURE ONLY: CPT | Performed by: ORTHOPAEDIC SURGERY

## 2023-09-19 PROCEDURE — 20610 DRAIN/INJ JOINT/BURSA W/O US: CPT | Performed by: ORTHOPAEDIC SURGERY

## 2023-09-19 NOTE — PROGRESS NOTES
Chief Complaint   Patient presents with    Knee Pain     Zilretta b/l        I will proceed with a cortisone injection in the Bilateral knee. Verbal and written consent was obtained for the injections. The skin was prepped with alcohol. A prepared mixture of 32 mg of Zilretta and 5mL diluent was injected to Bilateral knee. The injection was given through the lateral side of the knee. The patient tolerated the injection well. I will see the patient back prn. [facility] was seen today for knee pain.     Diagnoses and all orders for this visit:    Primary osteoarthritis of right knee  -     triamcinolone acetonide (ZILRETTA) intra-articular injection 32 mg  -     [place] ARTHROCENTESIS ASPIR&/INJ MAJOR JT/BURSA W/O US    Primary osteoarthritis of left knee  -     triamcinolone acetonide (ZILRETTA) intra-articular injection 32 mg  -     [place] ARTHROCENTESIS ASPIR&/INJ MAJOR JT/BURSA W/O US

## 2024-03-19 ENCOUNTER — OFFICE VISIT (OUTPATIENT)
Dept: ORTHOPEDIC SURGERY | Age: 69
End: 2024-03-19

## 2024-03-19 DIAGNOSIS — M17.12 PRIMARY OSTEOARTHRITIS OF LEFT KNEE: ICD-10-CM

## 2024-03-19 DIAGNOSIS — M17.11 PRIMARY OSTEOARTHRITIS OF RIGHT KNEE: Primary | ICD-10-CM

## 2024-03-22 NOTE — PROGRESS NOTES
Chief Complaint   Patient presents with    Injections     Bilateral knee Zilretta injection        I will proceed with a cortisone injection in the Bilateral knee.  Verbal and written consent was obtained for the injections. The skin was prepped with alcohol.  A prepared mixture of 32 mg of Zilretta and 5mL diluent was injected to Bilateral knee. The injection was given through the lateral side of the knee. The patient tolerated the injection well. I will see the patient back prn.    Jason was seen today for injections.    Diagnoses and all orders for this visit:    Primary osteoarthritis of right knee  -     [place] ARTHROCENTESIS ASPIR&/INJ MAJOR JT/BURSA W/O US  -     triamcinolone acetonide (ZILRETTA) intra-articular injection 32 mg    Primary osteoarthritis of left knee  -     [place] ARTHROCENTESIS ASPIR&/INJ MAJOR JT/BURSA W/O US  -     triamcinolone acetonide (ZILRETTA) intra-articular injection 32 mg

## 2024-06-24 ENCOUNTER — OFFICE VISIT (OUTPATIENT)
Dept: ORTHOPEDIC SURGERY | Age: 69
End: 2024-06-24

## 2024-06-24 DIAGNOSIS — M17.11 PRIMARY OSTEOARTHRITIS OF RIGHT KNEE: Primary | ICD-10-CM

## 2024-06-24 DIAGNOSIS — M17.12 PRIMARY OSTEOARTHRITIS OF LEFT KNEE: ICD-10-CM

## 2024-06-24 RX ORDER — CELECOXIB 200 MG/1
200 CAPSULE ORAL 2 TIMES DAILY
Qty: 60 CAPSULE | Refills: 3 | Status: SHIPPED | OUTPATIENT
Start: 2024-06-24

## 2024-06-28 NOTE — PROGRESS NOTES
Chief Complaint   Patient presents with    Injections     Bilateral knee Zilretta        I will proceed with a cortisone injection in the Bilateral knee.  Verbal and written consent was obtained for the injections. The skin was prepped with alcohol.  A prepared mixture of 32 mg of Zilretta and 5mL diluent was injected to Bilateral knee. The injection was given through the lateral side of the knee. The patient tolerated the injection well. I will see the patient back prn.    Jason was seen today for injections.    Diagnoses and all orders for this visit:    Primary osteoarthritis of right knee  -     [place] ARTHROCENTESIS ASPIR&/INJ MAJOR JT/BURSA W/O US  -     triamcinolone acetonide (ZILRETTA) intra-articular injection 32 mg    Primary osteoarthritis of left knee  -     [place] ARTHROCENTESIS ASPIR&/INJ MAJOR JT/BURSA W/O US  -     triamcinolone acetonide (ZILRETTA) intra-articular injection 32 mg    Other orders  -     celecoxib (CELEBREX) 200 MG capsule; Take 1 capsule by mouth 2 times daily

## 2024-07-30 ENCOUNTER — OFFICE VISIT (OUTPATIENT)
Dept: ORTHOPEDIC SURGERY | Age: 69
End: 2024-07-30

## 2024-07-30 VITALS — BODY MASS INDEX: 28.04 KG/M2 | WEIGHT: 207 LBS | HEIGHT: 72 IN

## 2024-07-30 DIAGNOSIS — M19.049 CMC ARTHRITIS: Primary | ICD-10-CM

## 2024-07-30 RX ORDER — CYCLOBENZAPRINE HCL 5 MG
5 TABLET ORAL 3 TIMES DAILY PRN
Qty: 30 TABLET | Refills: 0 | Status: SHIPPED | OUTPATIENT
Start: 2024-07-30 | End: 2024-08-09

## 2024-07-30 NOTE — PROGRESS NOTES
in the MCP flexion diminished/ extension diminished ,  DIP flexion diminished/ extension diminished.  There is not rotational deformity.    There is no masses or adenopathy in bilateral upper extremities.  Radial pulses are 2+ and symmetric bilaterally.  Capillary refill is intact and < 2 seconds.  Motor strength is 5/5 with flexion and extension of the small finger joints.      Right:  Phallens sign negative, Tinnells sign negative, Median nerve compression test negative ,  Finklesteins negative, CMC Grind test positive, Piano Key Test negative.     Left:  Phallens sign negative, Tinnells sign negative, Median nerve compression test negative ,  Finklesteins negative, CMC Grind test negative, Piano Key Test negative.    Xrays: not performed    Radiographic findings reviewed with patient    Impression:   Encounter Diagnosis   Name Primary?    CMC arthritis Yes       Plan: Natural history and expected course discussed. Questions answered.  Educational materials distributed.    I had a lengthy discussion with the patient regarding their diagnosis. I explained treatment options including surgical vs non surgical treatment. I reviewed in detail the risks and benefits and outlined the procedure in detail with expected outcomes and possible complications.  I also discussed non surgical treatment such as injections (CSI), physical therapy, topical creams and NSAID's. They have elected for conservative management at this time.       Verbal and written consent for the injection was given by the patient. The patient was placed in a supine position and the right thumb was cleaned in prepped with alcohol.  He was injected with .5 mL of lidocaine and .25 mL of Kenalog into the thumb. The patient tolerated the injection well.

## 2024-08-01 RX ORDER — TRIAMCINOLONE ACETONIDE 40 MG/ML
12 INJECTION, SUSPENSION INTRA-ARTICULAR; INTRAMUSCULAR ONCE
Status: COMPLETED | OUTPATIENT
Start: 2024-08-01 | End: 2024-08-01

## 2024-08-01 RX ADMIN — TRIAMCINOLONE ACETONIDE 12 MG: 40 INJECTION, SUSPENSION INTRA-ARTICULAR; INTRAMUSCULAR at 07:23

## 2024-09-24 ENCOUNTER — OFFICE VISIT (OUTPATIENT)
Dept: ORTHOPEDIC SURGERY | Age: 69
End: 2024-09-24
Payer: MEDICARE

## 2024-09-24 DIAGNOSIS — M17.11 PRIMARY OSTEOARTHRITIS OF RIGHT KNEE: Primary | ICD-10-CM

## 2024-09-24 DIAGNOSIS — M17.12 PRIMARY OSTEOARTHRITIS OF LEFT KNEE: ICD-10-CM

## 2024-09-24 PROCEDURE — 20610 DRAIN/INJ JOINT/BURSA W/O US: CPT | Performed by: ORTHOPAEDIC SURGERY

## 2024-12-30 ENCOUNTER — OFFICE VISIT (OUTPATIENT)
Dept: ORTHOPEDIC SURGERY | Age: 69
End: 2024-12-30
Payer: MEDICARE

## 2024-12-30 DIAGNOSIS — M17.11 PRIMARY OSTEOARTHRITIS OF RIGHT KNEE: Primary | ICD-10-CM

## 2024-12-30 DIAGNOSIS — M17.12 PRIMARY OSTEOARTHRITIS OF LEFT KNEE: ICD-10-CM

## 2024-12-30 PROCEDURE — 20610 DRAIN/INJ JOINT/BURSA W/O US: CPT | Performed by: ORTHOPAEDIC SURGERY

## 2024-12-31 NOTE — PROGRESS NOTES
Chief Complaint   Patient presents with    Injections     Bilateral knee Zilretta        I will proceed with a cortisone injection in the Bilateral knee.  Verbal and written consent was obtained for the injections. The skin was prepped with alcohol.  A prepared mixture of 32 mg of Zilretta and 5mL diluent was injected to Bilateral knee. The injection was given through the lateral side of the knee. The patient tolerated the injection well. I will see the patient back prn.    Jason was seen today for injections.    Diagnoses and all orders for this visit:    Primary osteoarthritis of right knee  -     triamcinolone acetonide (ZILRETTA) intra-articular injection 32 mg    Primary osteoarthritis of left knee  -     triamcinolone acetonide (ZILRETTA) intra-articular injection 32 mg

## 2025-03-10 ENCOUNTER — HOSPITAL ENCOUNTER (EMERGENCY)
Age: 70
Discharge: HOME | End: 2025-03-11
Attending: EMERGENCY MEDICINE
Payer: MEDICARE

## 2025-03-10 DIAGNOSIS — R10.84 GENERALIZED ABDOMINAL PAIN: ICD-10-CM

## 2025-03-10 DIAGNOSIS — R33.9 URINARY RETENTION: ICD-10-CM

## 2025-03-10 DIAGNOSIS — R11.2 NAUSEA AND VOMITING, UNSPECIFIED VOMITING TYPE: Primary | ICD-10-CM

## 2025-03-10 DIAGNOSIS — N39.0 URINARY TRACT INFECTION WITHOUT HEMATURIA, SITE UNSPECIFIED: ICD-10-CM

## 2025-03-10 PROCEDURE — 96374 THER/PROPH/DIAG INJ IV PUSH: CPT

## 2025-03-10 PROCEDURE — 99285 EMERGENCY DEPT VISIT HI MDM: CPT

## 2025-03-10 ASSESSMENT — LIFESTYLE VARIABLES
HOW MANY STANDARD DRINKS CONTAINING ALCOHOL DO YOU HAVE ON A TYPICAL DAY: 1 OR 2
HOW OFTEN DO YOU HAVE A DRINK CONTAINING ALCOHOL: 2-4 TIMES A MONTH

## 2025-03-11 ENCOUNTER — APPOINTMENT (OUTPATIENT)
Dept: CT IMAGING | Age: 70
End: 2025-03-11
Payer: MEDICARE

## 2025-03-11 VITALS
BODY MASS INDEX: 29.16 KG/M2 | DIASTOLIC BLOOD PRESSURE: 78 MMHG | TEMPERATURE: 98.4 F | SYSTOLIC BLOOD PRESSURE: 146 MMHG | RESPIRATION RATE: 16 BRPM | HEART RATE: 65 BPM | OXYGEN SATURATION: 96 % | WEIGHT: 215 LBS

## 2025-03-11 LAB
ALBUMIN SERPL-MCNC: 4.5 G/DL (ref 3.5–5.2)
ALP SERPL-CCNC: 57 U/L (ref 40–129)
ALT SERPL-CCNC: 25 U/L (ref 0–40)
ANION GAP SERPL CALCULATED.3IONS-SCNC: 14 MMOL/L (ref 7–16)
AST SERPL-CCNC: 19 U/L (ref 0–39)
BACTERIA URNS QL MICRO: ABNORMAL
BASOPHILS # BLD: 0.05 K/UL (ref 0–0.2)
BASOPHILS NFR BLD: 1 % (ref 0–2)
BILIRUB SERPL-MCNC: 0.3 MG/DL (ref 0–1.2)
BILIRUB UR QL STRIP: ABNORMAL
BUN SERPL-MCNC: 13 MG/DL (ref 6–23)
CALCIUM SERPL-MCNC: 9.4 MG/DL (ref 8.6–10.2)
CHLORIDE SERPL-SCNC: 107 MMOL/L (ref 98–107)
CLARITY UR: CLEAR
CO2 SERPL-SCNC: 22 MMOL/L (ref 22–29)
COLOR UR: YELLOW
CREAT SERPL-MCNC: 1 MG/DL (ref 0.7–1.2)
EOSINOPHIL # BLD: 0.22 K/UL (ref 0.05–0.5)
EOSINOPHILS RELATIVE PERCENT: 3 % (ref 0–6)
ERYTHROCYTE [DISTWIDTH] IN BLOOD BY AUTOMATED COUNT: 14.1 % (ref 11.5–15)
GFR, ESTIMATED: 88 ML/MIN/1.73M2
GLUCOSE SERPL-MCNC: 109 MG/DL (ref 74–99)
GLUCOSE UR STRIP-MCNC: 250 MG/DL
HCT VFR BLD AUTO: 42.5 % (ref 37–54)
HGB BLD-MCNC: 14.1 G/DL (ref 12.5–16.5)
HGB UR QL STRIP.AUTO: NEGATIVE
IMM GRANULOCYTES # BLD AUTO: <0.03 K/UL (ref 0–0.58)
IMM GRANULOCYTES NFR BLD: 0 % (ref 0–5)
KETONES UR STRIP-MCNC: ABNORMAL MG/DL
LACTATE BLDV-SCNC: 1.3 MMOL/L (ref 0.5–2.2)
LEUKOCYTE ESTERASE UR QL STRIP: NEGATIVE
LIPASE SERPL-CCNC: 23 U/L (ref 13–60)
LYMPHOCYTES NFR BLD: 1.13 K/UL (ref 1.5–4)
LYMPHOCYTES RELATIVE PERCENT: 16 % (ref 20–42)
MCH RBC QN AUTO: 28.3 PG (ref 26–35)
MCHC RBC AUTO-ENTMCNC: 33.2 G/DL (ref 32–34.5)
MCV RBC AUTO: 85.2 FL (ref 80–99.9)
MONOCYTES NFR BLD: 0.61 K/UL (ref 0.1–0.95)
MONOCYTES NFR BLD: 9 % (ref 2–12)
NEUTROPHILS NFR BLD: 71 % (ref 43–80)
NEUTS SEG NFR BLD: 5.08 K/UL (ref 1.8–7.3)
NITRITE UR QL STRIP: POSITIVE
PH UR STRIP: 5.5 [PH] (ref 5–8)
PLATELET # BLD AUTO: 166 K/UL (ref 130–450)
PMV BLD AUTO: 10.2 FL (ref 7–12)
POTASSIUM SERPL-SCNC: 3.8 MMOL/L (ref 3.5–5)
PROT SERPL-MCNC: 6.7 G/DL (ref 6.4–8.3)
PROT UR STRIP-MCNC: 30 MG/DL
RBC # BLD AUTO: 4.99 M/UL (ref 3.8–5.8)
RBC #/AREA URNS HPF: ABNORMAL /HPF
SODIUM SERPL-SCNC: 143 MMOL/L (ref 132–146)
SP GR UR STRIP: >1.03 (ref 1–1.03)
TROPONIN I SERPL HS-MCNC: 10 NG/L (ref 0–11)
TROPONIN I SERPL HS-MCNC: 12 NG/L (ref 0–11)
UROBILINOGEN UR STRIP-ACNC: 4 EU/DL (ref 0–1)
WBC #/AREA URNS HPF: ABNORMAL /HPF
WBC OTHER # BLD: 7.1 K/UL (ref 4.5–11.5)

## 2025-03-11 PROCEDURE — 6360000004 HC RX CONTRAST MEDICATION: Performed by: RADIOLOGY

## 2025-03-11 PROCEDURE — 74176 CT ABD & PELVIS W/O CONTRAST: CPT

## 2025-03-11 PROCEDURE — 84484 ASSAY OF TROPONIN QUANT: CPT

## 2025-03-11 PROCEDURE — 96361 HYDRATE IV INFUSION ADD-ON: CPT

## 2025-03-11 PROCEDURE — 96374 THER/PROPH/DIAG INJ IV PUSH: CPT

## 2025-03-11 PROCEDURE — 93005 ELECTROCARDIOGRAM TRACING: CPT | Performed by: EMERGENCY MEDICINE

## 2025-03-11 PROCEDURE — 2580000003 HC RX 258: Performed by: EMERGENCY MEDICINE

## 2025-03-11 PROCEDURE — 74177 CT ABD & PELVIS W/CONTRAST: CPT

## 2025-03-11 PROCEDURE — 2500000003 HC RX 250 WO HCPCS: Performed by: EMERGENCY MEDICINE

## 2025-03-11 PROCEDURE — 87086 URINE CULTURE/COLONY COUNT: CPT

## 2025-03-11 PROCEDURE — 51702 INSERT TEMP BLADDER CATH: CPT

## 2025-03-11 PROCEDURE — 6360000002 HC RX W HCPCS: Performed by: EMERGENCY MEDICINE

## 2025-03-11 PROCEDURE — 96375 TX/PRO/DX INJ NEW DRUG ADDON: CPT

## 2025-03-11 PROCEDURE — 81001 URINALYSIS AUTO W/SCOPE: CPT

## 2025-03-11 PROCEDURE — 83605 ASSAY OF LACTIC ACID: CPT

## 2025-03-11 PROCEDURE — 83690 ASSAY OF LIPASE: CPT

## 2025-03-11 PROCEDURE — 85025 COMPLETE CBC W/AUTO DIFF WBC: CPT

## 2025-03-11 PROCEDURE — 80053 COMPREHEN METABOLIC PANEL: CPT

## 2025-03-11 RX ORDER — KETOROLAC TROMETHAMINE 10 MG/1
10 TABLET, FILM COATED ORAL EVERY 6 HOURS PRN
Qty: 20 TABLET | Refills: 0 | Status: SHIPPED | OUTPATIENT
Start: 2025-03-11

## 2025-03-11 RX ORDER — METOCLOPRAMIDE HYDROCHLORIDE 5 MG/ML
10 INJECTION INTRAMUSCULAR; INTRAVENOUS ONCE
Status: COMPLETED | OUTPATIENT
Start: 2025-03-11 | End: 2025-03-11

## 2025-03-11 RX ORDER — METRONIDAZOLE 500 MG/1
500 TABLET ORAL 2 TIMES DAILY
Qty: 20 TABLET | Refills: 0 | Status: SHIPPED | OUTPATIENT
Start: 2025-03-11 | End: 2025-03-21

## 2025-03-11 RX ORDER — 0.9 % SODIUM CHLORIDE 0.9 %
1000 INTRAVENOUS SOLUTION INTRAVENOUS ONCE
Status: COMPLETED | OUTPATIENT
Start: 2025-03-11 | End: 2025-03-11

## 2025-03-11 RX ORDER — IOPAMIDOL 755 MG/ML
20 INJECTION, SOLUTION INTRAVASCULAR
Status: COMPLETED | OUTPATIENT
Start: 2025-03-11 | End: 2025-03-11

## 2025-03-11 RX ORDER — ONDANSETRON 2 MG/ML
4 INJECTION INTRAMUSCULAR; INTRAVENOUS ONCE
Status: COMPLETED | OUTPATIENT
Start: 2025-03-11 | End: 2025-03-11

## 2025-03-11 RX ORDER — PROMETHAZINE HYDROCHLORIDE 25 MG/1
25 TABLET ORAL EVERY 6 HOURS PRN
Qty: 20 TABLET | Refills: 0 | Status: SHIPPED | OUTPATIENT
Start: 2025-03-11 | End: 2025-03-18

## 2025-03-11 RX ORDER — IOPAMIDOL 755 MG/ML
80 INJECTION, SOLUTION INTRAVASCULAR
Status: COMPLETED | OUTPATIENT
Start: 2025-03-11 | End: 2025-03-11

## 2025-03-11 RX ORDER — DICYCLOMINE HCL 20 MG
20 TABLET ORAL 4 TIMES DAILY
Qty: 10 TABLET | Refills: 0 | Status: SHIPPED | OUTPATIENT
Start: 2025-03-11

## 2025-03-11 RX ORDER — CEFDINIR 300 MG/1
300 CAPSULE ORAL 2 TIMES DAILY
Qty: 20 CAPSULE | Refills: 0 | Status: SHIPPED | OUTPATIENT
Start: 2025-03-11 | End: 2025-03-21

## 2025-03-11 RX ADMIN — METOCLOPRAMIDE HYDROCHLORIDE 10 MG: 5 INJECTION INTRAMUSCULAR; INTRAVENOUS at 03:04

## 2025-03-11 RX ADMIN — SODIUM CHLORIDE 1000 ML: 0.9 INJECTION, SOLUTION INTRAVENOUS at 01:45

## 2025-03-11 RX ADMIN — IOPAMIDOL 80 ML: 755 INJECTION, SOLUTION INTRAVENOUS at 01:43

## 2025-03-11 RX ADMIN — IOPAMIDOL 20 ML: 755 INJECTION, SOLUTION INTRAVENOUS at 04:08

## 2025-03-11 RX ADMIN — ONDANSETRON 4 MG: 2 INJECTION INTRAMUSCULAR; INTRAVENOUS at 01:11

## 2025-03-11 RX ADMIN — CEFTRIAXONE SODIUM 2000 MG: 2 INJECTION, POWDER, FOR SOLUTION INTRAMUSCULAR; INTRAVENOUS at 02:37

## 2025-03-11 ASSESSMENT — ENCOUNTER SYMPTOMS
BACK PAIN: 0
COUGH: 0
WHEEZING: 0
EYE PAIN: 0
SORE THROAT: 0
DIARRHEA: 1
SHORTNESS OF BREATH: 0
ABDOMINAL PAIN: 1
NAUSEA: 1
SINUS PRESSURE: 0
EYE REDNESS: 0
EYE DISCHARGE: 0
VOMITING: 1

## 2025-03-11 NOTE — ED PROVIDER NOTES
Patient is a 70 y/o male who presents to the ED with abdominal pain and urinary retention. Patient states he had onset of nausea and vomiting three days ago. He developed diarrhea two days ago. Today, he has been unable to urinate. He states that he last urinated approximately 8 hours prior to arrival. He has suprapubic abdominal pain and right flank pain. He denies any fever. He denies any gross hematuria.          Review of Systems   Constitutional:  Negative for chills and fever.   HENT:  Negative for ear pain, sinus pressure and sore throat.    Eyes:  Negative for pain, discharge and redness.   Respiratory:  Negative for cough, shortness of breath and wheezing.    Cardiovascular:  Negative for chest pain.   Gastrointestinal:  Positive for abdominal pain, diarrhea, nausea and vomiting.   Genitourinary:  Positive for difficulty urinating and flank pain. Negative for dysuria and frequency.   Musculoskeletal:  Negative for arthralgias and back pain.   Skin:  Negative for rash and wound.   Neurological:  Negative for weakness and headaches.   Hematological:  Negative for adenopathy.   All other systems reviewed and are negative.       Physical Exam  Vitals and nursing note reviewed.   Constitutional:       General: He is not in acute distress.  HENT:      Head: Normocephalic and atraumatic.      Mouth/Throat:      Mouth: Mucous membranes are moist.   Eyes:      Pupils: Pupils are equal, round, and reactive to light.   Cardiovascular:      Rate and Rhythm: Normal rate and regular rhythm.      Heart sounds: No murmur heard.  Pulmonary:      Effort: Pulmonary effort is normal. No respiratory distress.      Breath sounds: Normal breath sounds. No stridor. No wheezing, rhonchi or rales.   Abdominal:      General: Abdomen is flat. Bowel sounds are normal.      Palpations: Abdomen is soft.      Tenderness: There is abdominal tenderness in the epigastric area and suprapubic area. There is right CVA tenderness.   Skin:

## 2025-03-11 NOTE — ED NOTES
Patient and family educated on [facility] care and exchanges for leg bag. Patient and family stated understanding and gave correct each back demonstration

## 2025-03-12 LAB
EKG ATRIAL RATE: 66 BPM
EKG P AXIS: 37 DEGREES
EKG P-R INTERVAL: 160 MS
EKG Q-T INTERVAL: 386 MS
EKG QRS DURATION: 98 MS
EKG QTC CALCULATION (BAZETT): 404 MS
EKG R AXIS: 42 DEGREES
EKG T AXIS: 40 DEGREES
EKG VENTRICULAR RATE: 66 BPM
MICROORGANISM SPEC CULT: NO GROWTH
SERVICE CMNT-IMP: NORMAL
SPECIMEN DESCRIPTION: NORMAL

## 2025-03-12 PROCEDURE — 93010 ELECTROCARDIOGRAM REPORT: CPT | Performed by: INTERNAL MEDICINE

## 2025-03-21 DIAGNOSIS — M17.12 PRIMARY OSTEOARTHRITIS OF LEFT KNEE: ICD-10-CM

## 2025-03-21 DIAGNOSIS — M17.11 PRIMARY OSTEOARTHRITIS OF RIGHT KNEE: Primary | ICD-10-CM

## 2025-03-31 ENCOUNTER — OFFICE VISIT (OUTPATIENT)
Dept: ORTHOPEDIC SURGERY | Age: 70
End: 2025-03-31
Payer: MEDICARE

## 2025-03-31 VITALS — BODY MASS INDEX: 29.12 KG/M2 | WEIGHT: 215 LBS | HEIGHT: 72 IN

## 2025-03-31 DIAGNOSIS — M17.11 PRIMARY OSTEOARTHRITIS OF RIGHT KNEE: Primary | ICD-10-CM

## 2025-03-31 DIAGNOSIS — M17.12 PRIMARY OSTEOARTHRITIS OF LEFT KNEE: ICD-10-CM

## 2025-03-31 PROCEDURE — 99213 OFFICE O/P EST LOW 20 MIN: CPT | Performed by: ORTHOPAEDIC SURGERY

## 2025-03-31 PROCEDURE — 20610 DRAIN/INJ JOINT/BURSA W/O US: CPT | Performed by: ORTHOPAEDIC SURGERY

## 2025-03-31 PROCEDURE — 1123F ACP DISCUSS/DSCN MKR DOCD: CPT | Performed by: ORTHOPAEDIC SURGERY

## 2025-03-31 PROCEDURE — 1125F AMNT PAIN NOTED PAIN PRSNT: CPT | Performed by: ORTHOPAEDIC SURGERY

## 2025-03-31 PROCEDURE — 1159F MED LIST DOCD IN RCRD: CPT | Performed by: ORTHOPAEDIC SURGERY

## 2025-03-31 NOTE — PROGRESS NOTES
Chief Complaint   Patient presents with    Knee Pain     Bilateral Knee F/U        Jason Dolan returns today for follow-up of his bilateral knee pain R>L. he reports this is worse than when I saw him last. The previous treatment was successful.    Past Medical History:   Diagnosis Date    Benign gastrointestinal stromal tumor (GIST)     Cancer (HCC)     skin  squamos    Hyperlipidemia     Thyroid disease      Past Surgical History:   Procedure Laterality Date    COLECTOMY N/A 5/18/2021    ROBOTIC  LAPAROSCOPIC ASSISTED VENTRAL HERNIA REPAIR WITH MESH AND LYSIS OF ADHESIONS performed by Lit Haynes MD [place] [facility] [place]    COLONOSCOPY      ENDOSCOPY, COLON, DIAGNOSTIC      HEEL SPUR SURGERY Left 7/1/2021    RESECTION RETROCALCANEAL EXOSTOSIS LEFT FOOT, REPAIR ACHILLES TENDON LEFT (CPT 68474) (ARTHREX) performed by Bonilla Vargas Jr., DPM at [facility] OR    HERNIA REPAIR  1998    KNEE CARTILAGE SURGERY Right 2020    SKIN BIOPSY Right     x2 - right ankle - skin cancer     SMALL INTESTINE SURGERY  1998       Current Outpatient Medications:     dicyclomine (BENTYL) 20 MG tablet, Take 1 tablet by mouth 4 times daily, Disp: 10 tablet, Rfl: 0    ketorolac (TORADOL) 10 MG tablet, Take 1 tablet by mouth every 6 hours as needed for Pain, Disp: 20 tablet, Rfl: 0    celecoxib (CELEBREX) 200 MG capsule, Take 1 capsule by mouth 2 times daily, Disp: 60 capsule, Rfl: 3    metoprolol succinate (TOPROL XL) 25 MG extended release tablet, Take 1 tablet by mouth daily, Disp: , Rfl:     atorvastatin (LIPITOR) 10 MG tablet, TAKE 1 TABLET BY MOUTH ONCE DAILY AS DIRECTED FOR 30 DAYS, Disp: , Rfl:     acetaminophen (TYLENOL) 500 MG tablet, Take 2 tablets by mouth daily as needed for Pain, Disp: , Rfl:     diclofenac sodium (VOLTAREN) 1 % GEL, APPLY 2GRAMS TOPICALLY TO AFFECTED AREA 4 TIMES DAILY AS DIRECTED, Disp: , Rfl:     fenofibrate (TRICOR) 145 MG tablet, TAKE 1 TABLET BY MOUTH ONCE DAILY AS DIRECTED FOR 30 DAYS, Disp: , Rfl:

## 2025-06-24 ENCOUNTER — OFFICE VISIT (OUTPATIENT)
Dept: ORTHOPEDIC SURGERY | Age: 70
End: 2025-06-24

## 2025-06-24 VITALS — WEIGHT: 215 LBS | HEIGHT: 72 IN | BODY MASS INDEX: 29.12 KG/M2

## 2025-06-24 DIAGNOSIS — M17.11 PRIMARY OSTEOARTHRITIS OF RIGHT KNEE: Primary | ICD-10-CM

## 2025-06-24 DIAGNOSIS — M17.12 PRIMARY OSTEOARTHRITIS OF LEFT KNEE: ICD-10-CM

## 2025-06-24 LAB
APPEARANCE: ABNORMAL
CLOT CHECK: ABNORMAL
COLOR FLUID: YELLOW
MONO/MACROPHAGE FLUID: 6 %
NEUTROPHIL, FLUID: 95 %
RBC, SYNOVIAL FLUID: 3000 CELLS/UL
SPECIMEN TYPE: ABNORMAL
WBC COUNT, SYNOVIAL FLUID: ABNORMAL CELLS/UL

## 2025-06-24 RX ORDER — HYDROCODONE BITARTRATE AND ACETAMINOPHEN 5; 325 MG/1; MG/1
1 TABLET ORAL EVERY 6 HOURS PRN
Qty: 20 TABLET | Refills: 0 | Status: ON HOLD | OUTPATIENT
Start: 2025-06-24 | End: 2025-06-29 | Stop reason: HOSPADM

## 2025-06-24 NOTE — PROGRESS NOTES
Chief Complaint   Patient presents with    Knee Pain     Left Knee, F/U, states of increased pain in the left knee and left hip.         Jason Dolan returns today for follow-up of his bilateral knee pain L>R. He stated his left knee has been swollen ane more painful since Friday. Previous treatment with injections give him short term relief.    Past Medical History:   Diagnosis Date    Benign gastrointestinal stromal tumor (GIST)     Cancer (HCC)     skin  squamos    Hyperlipidemia     Thyroid disease      Past Surgical History:   Procedure Laterality Date    COLECTOMY N/A 5/18/2021    ROBOTIC  LAPAROSCOPIC ASSISTED VENTRAL HERNIA REPAIR WITH MESH AND LYSIS OF ADHESIONS performed by Lit Haynes MD at [facility] [place]    COLONOSCOPY      ENDOSCOPY, COLON, DIAGNOSTIC      HEEL SPUR SURGERY Left 7/1/2021    RESECTION RETROCALCANEAL EXOSTOSIS LEFT FOOT, REPAIR ACHILLES TENDON LEFT (CPT 39184) (ARTHREX) performed by Bonilla Vargas Jr., DPM at [facility] OR    HERNIA REPAIR  1998    KNEE CARTILAGE SURGERY Right 2020    SKIN BIOPSY Right     x2 - right ankle - skin cancer     SMALL INTESTINE SURGERY  1998       Current Outpatient Medications:     dicyclomine (BENTYL) 20 MG tablet, Take 1 tablet by mouth 4 times daily, Disp: 10 tablet, Rfl: 0    ketorolac (TORADOL) 10 MG tablet, Take 1 tablet by mouth every 6 hours as needed for Pain, Disp: 20 tablet, Rfl: 0    celecoxib (CELEBREX) 200 MG capsule, Take 1 capsule by mouth 2 times daily, Disp: 60 capsule, Rfl: 3    metoprolol succinate (TOPROL XL) 25 MG extended release tablet, Take 1 tablet by mouth daily, Disp: , Rfl:     atorvastatin (LIPITOR) 10 MG tablet, TAKE 1 TABLET BY MOUTH ONCE DAILY AS DIRECTED FOR 30 DAYS, Disp: , Rfl:     acetaminophen (TYLENOL) 500 MG tablet, Take 2 tablets by mouth daily as needed for Pain, Disp: , Rfl:     diclofenac sodium (VOLTAREN) 1 % GEL, APPLY 2GRAMS TOPICALLY TO AFFECTED AREA 4 TIMES DAILY AS DIRECTED, Disp: , Rfl:     fenofibrate

## 2025-06-25 ENCOUNTER — APPOINTMENT (OUTPATIENT)
Dept: GENERAL RADIOLOGY | Age: 70
DRG: 872 | End: 2025-06-25
Payer: MEDICARE

## 2025-06-25 ENCOUNTER — HOSPITAL ENCOUNTER (INPATIENT)
Age: 70
LOS: 4 days | Discharge: HOME HEALTH | DRG: 872 | End: 2025-06-29
Attending: EMERGENCY MEDICINE | Admitting: ORTHOPAEDIC SURGERY
Payer: MEDICARE

## 2025-06-25 DIAGNOSIS — M00.9 PYOGENIC ARTHRITIS OF KNEE, DUE TO UNSPECIFIED ORGANISM, UNSPECIFIED LATERALITY (HCC): Primary | ICD-10-CM

## 2025-06-25 DIAGNOSIS — M00.9 PYOGENIC ARTHRITIS OF LEFT KNEE JOINT, DUE TO UNSPECIFIED ORGANISM (HCC): ICD-10-CM

## 2025-06-25 DIAGNOSIS — G89.18 ACUTE POST-OPERATIVE PAIN: ICD-10-CM

## 2025-06-25 LAB
CREAT SERPL-MCNC: 0.9 MG/DL (ref 0.7–1.2)
GFR, ESTIMATED: 88 ML/MIN/1.73M2

## 2025-06-25 PROCEDURE — 6360000002 HC RX W HCPCS

## 2025-06-25 PROCEDURE — 2500000003 HC RX 250 WO HCPCS

## 2025-06-25 PROCEDURE — 36415 COLL VENOUS BLD VENIPUNCTURE: CPT

## 2025-06-25 PROCEDURE — 2060000000 HC ICU INTERMEDIATE R&B

## 2025-06-25 PROCEDURE — 6370000000 HC RX 637 (ALT 250 FOR IP)

## 2025-06-25 PROCEDURE — 99285 EMERGENCY DEPT VISIT HI MDM: CPT

## 2025-06-25 PROCEDURE — 73562 X-RAY EXAM OF KNEE 3: CPT

## 2025-06-25 PROCEDURE — 82565 ASSAY OF CREATININE: CPT

## 2025-06-25 RX ORDER — SODIUM CHLORIDE 9 MG/ML
INJECTION, SOLUTION INTRAVENOUS PRN
Status: DISCONTINUED | OUTPATIENT
Start: 2025-06-25 | End: 2025-06-29 | Stop reason: HOSPADM

## 2025-06-25 RX ORDER — ACETAMINOPHEN 325 MG/1
650 TABLET ORAL EVERY 6 HOURS SCHEDULED
Status: DISCONTINUED | OUTPATIENT
Start: 2025-06-25 | End: 2025-06-29 | Stop reason: HOSPADM

## 2025-06-25 RX ORDER — ACETAMINOPHEN 650 MG/1
650 SUPPOSITORY RECTAL EVERY 6 HOURS SCHEDULED
Status: DISCONTINUED | OUTPATIENT
Start: 2025-06-25 | End: 2025-06-29 | Stop reason: HOSPADM

## 2025-06-25 RX ORDER — OXYCODONE HYDROCHLORIDE 5 MG/1
5 TABLET ORAL EVERY 4 HOURS PRN
Refills: 0 | Status: DISCONTINUED | OUTPATIENT
Start: 2025-06-25 | End: 2025-06-29 | Stop reason: HOSPADM

## 2025-06-25 RX ORDER — SODIUM CHLORIDE 0.9 % (FLUSH) 0.9 %
5-40 SYRINGE (ML) INJECTION PRN
Status: DISCONTINUED | OUTPATIENT
Start: 2025-06-25 | End: 2025-06-29 | Stop reason: HOSPADM

## 2025-06-25 RX ORDER — ONDANSETRON 2 MG/ML
4 INJECTION INTRAMUSCULAR; INTRAVENOUS EVERY 6 HOURS PRN
Status: DISCONTINUED | OUTPATIENT
Start: 2025-06-25 | End: 2025-06-29 | Stop reason: HOSPADM

## 2025-06-25 RX ORDER — ONDANSETRON 4 MG/1
4 TABLET, ORALLY DISINTEGRATING ORAL EVERY 8 HOURS PRN
Status: DISCONTINUED | OUTPATIENT
Start: 2025-06-25 | End: 2025-06-29 | Stop reason: HOSPADM

## 2025-06-25 RX ORDER — ASPIRIN 81 MG/1
81 TABLET ORAL 2 TIMES DAILY
Status: DISCONTINUED | OUTPATIENT
Start: 2025-06-27 | End: 2025-06-26

## 2025-06-25 RX ORDER — MORPHINE SULFATE 2 MG/ML
2 INJECTION, SOLUTION INTRAMUSCULAR; INTRAVENOUS EVERY 4 HOURS PRN
Refills: 0 | Status: DISCONTINUED | OUTPATIENT
Start: 2025-06-25 | End: 2025-06-29 | Stop reason: HOSPADM

## 2025-06-25 RX ORDER — POTASSIUM CHLORIDE 7.45 MG/ML
10 INJECTION INTRAVENOUS PRN
Status: DISCONTINUED | OUTPATIENT
Start: 2025-06-25 | End: 2025-06-29 | Stop reason: HOSPADM

## 2025-06-25 RX ORDER — SODIUM CHLORIDE 0.9 % (FLUSH) 0.9 %
5-40 SYRINGE (ML) INJECTION EVERY 12 HOURS SCHEDULED
Status: DISCONTINUED | OUTPATIENT
Start: 2025-06-25 | End: 2025-06-29 | Stop reason: HOSPADM

## 2025-06-25 RX ORDER — POLYETHYLENE GLYCOL 3350 17 G/17G
17 POWDER, FOR SOLUTION ORAL DAILY PRN
Status: DISCONTINUED | OUTPATIENT
Start: 2025-06-25 | End: 2025-06-29 | Stop reason: HOSPADM

## 2025-06-25 RX ORDER — MAGNESIUM SULFATE IN WATER 40 MG/ML
2000 INJECTION, SOLUTION INTRAVENOUS PRN
Status: DISCONTINUED | OUTPATIENT
Start: 2025-06-25 | End: 2025-06-29 | Stop reason: HOSPADM

## 2025-06-25 RX ORDER — POTASSIUM CHLORIDE 1500 MG/1
40 TABLET, EXTENDED RELEASE ORAL PRN
Status: DISCONTINUED | OUTPATIENT
Start: 2025-06-25 | End: 2025-06-29 | Stop reason: HOSPADM

## 2025-06-25 RX ADMIN — OXYCODONE 5 MG: 5 TABLET ORAL at 20:23

## 2025-06-25 RX ADMIN — MORPHINE SULFATE 2 MG: 2 INJECTION, SOLUTION INTRAMUSCULAR; INTRAVENOUS at 23:52

## 2025-06-25 RX ADMIN — CEFAZOLIN SODIUM 2000 MG: 1 POWDER, FOR SOLUTION INTRAMUSCULAR; INTRAVENOUS at 23:54

## 2025-06-25 RX ADMIN — MORPHINE SULFATE 2 MG: 2 INJECTION, SOLUTION INTRAMUSCULAR; INTRAVENOUS at 12:23

## 2025-06-25 RX ADMIN — OXYCODONE 5 MG: 5 TABLET ORAL at 14:17

## 2025-06-25 RX ADMIN — Medication 10 ML: at 21:20

## 2025-06-25 RX ADMIN — CEFAZOLIN SODIUM 2000 MG: 1 POWDER, FOR SOLUTION INTRAMUSCULAR; INTRAVENOUS at 15:29

## 2025-06-25 RX ADMIN — ACETAMINOPHEN 650 MG: 325 TABLET ORAL at 17:08

## 2025-06-25 RX ADMIN — MORPHINE SULFATE 2 MG: 2 INJECTION, SOLUTION INTRAMUSCULAR; INTRAVENOUS at 17:09

## 2025-06-25 ASSESSMENT — PAIN SCALES - GENERAL
PAINLEVEL_OUTOF10: 9
PAINLEVEL_OUTOF10: 10
PAINLEVEL_OUTOF10: 9

## 2025-06-25 ASSESSMENT — PAIN DESCRIPTION - ORIENTATION
ORIENTATION: LEFT

## 2025-06-25 ASSESSMENT — PAIN DESCRIPTION - LOCATION
LOCATION: KNEE

## 2025-06-25 ASSESSMENT — LIFESTYLE VARIABLES
HOW MANY STANDARD DRINKS CONTAINING ALCOHOL DO YOU HAVE ON A TYPICAL DAY: PATIENT DOES NOT DRINK
HOW MANY STANDARD DRINKS CONTAINING ALCOHOL DO YOU HAVE ON A TYPICAL DAY: 1 OR 2
HOW OFTEN DO YOU HAVE A DRINK CONTAINING ALCOHOL: NEVER
HOW OFTEN DO YOU HAVE A DRINK CONTAINING ALCOHOL: MONTHLY OR LESS

## 2025-06-25 ASSESSMENT — PAIN DESCRIPTION - DESCRIPTORS
DESCRIPTORS: ACHING
DESCRIPTORS: ACHING

## 2025-06-25 NOTE — PLAN OF CARE
Problem: Discharge Planning  Goal: Discharge to home or other facility with appropriate resources  Outcome: Progressing  Flowsheets  Taken 6/25/2025 1328  Discharge to home or other facility with appropriate resources: Identify barriers to discharge with patient and caregiver  Taken 6/25/2025 1319  Discharge to home or other facility with appropriate resources: Identify barriers to discharge with patient and caregiver     Problem: Safety - Adult  Goal: Free from fall injury  Outcome: Progressing

## 2025-06-25 NOTE — H&P
Department of Orthopedic Surgery  History and Physical Note    HISTORY OF PRESENT ILLNESS:       Patient is a 69 y.o. male who presents with left knee pain.  Patient was recently seen outpatient yesterday by Dr. Benites complaining of left knee pain of multiple day duration as well as some night sweats and fevers.  At that time his left knee underwent arthrocentesis in the office and the lab results revealed elevated cell count and neutrophil count indicating septic arthritis for this reason he was told to come to the hospital for admittance from the ED for surgery on 6/26/2025 for left knee I&D.  Upon this presentation patient is resting comfortably sitting in bed complaining of pain only to the left knee.  Denies any current fevers or chills at this time.  Denies any increasing numbness tingling paresthesias down on left lower extremity left foot.  Denies any pain anywhere else on the body  Denies any other orthopedic complaints    Past Medical History:        Diagnosis Date    Benign gastrointestinal stromal tumor (GIST)     Cancer (HCC)     skin  squamos    Hyperlipidemia     Thyroid disease      Past Surgical History:        Procedure Laterality Date    COLECTOMY N/A 5/18/2021    ROBOTIC  LAPAROSCOPIC ASSISTED VENTRAL HERNIA REPAIR WITH MESH AND LYSIS OF ADHESIONS performed by Lit Haynes MD at [facility] [place]    COLONOSCOPY      ENDOSCOPY, COLON, DIAGNOSTIC      HEEL SPUR SURGERY Left 7/1/2021    RESECTION RETROCALCANEAL EXOSTOSIS LEFT FOOT, REPAIR ACHILLES TENDON LEFT (CPT 14954) (ARTHREX) performed by Bonilla Vargas Jr., DPM at [facility] OR    HERNIA REPAIR  1998    KNEE CARTILAGE SURGERY Right 2020    SKIN BIOPSY Right     x2 - right ankle - skin cancer     SMALL INTESTINE SURGERY  1998     Current Medications:   Current Facility-Administered Medications: sodium chloride flush 0.9 % injection 5-40 mL, 5-40 mL, IntraVENous, 2 times per day  sodium chloride flush 0.9 % injection 5-40 mL, 5-40 mL, IntraVENous, 
   MPV 10.2 03/11/2025 12:15 AM     PT/INR:  No results found for: \"PROTIME\", \"INR\"    Radiology Review:  X-ray left knee pending    IMPRESSION:  Left knee septic joint    PLAN:  Plan discussed with patient all patient's questions answered to his satisfaction  Discussed with him based on physical exam and lab results of a cell count of his knee arthrocentesis being around 50,000 and neutrophils 95% along with his exam that he most likely has septic arthritis of his left knee which needs to be treated acutely from a surgical standpoint with left knee I&D.  All risks and benefits of nonoperative versus operative management were discussed with the patient and he wants to proceed with the procedure  Weightbearing as tolerated left lower extremity for time being  PT/OT as able  Medical recommendations appreciated  Hold anticoagulation for surgery  N.p.o. at midnight 0015 on 6/26/2025  Ancef on-call in OR  Plan: Left knee I&D on 6/26/25 with Dr. Benites  Plan was discussed with attending    All questions were sought and answered during encounter    Electronically signed by Fili Gallo DO on 6/25/2025 at 10:49 AM

## 2025-06-25 NOTE — ED PROVIDER NOTES
Patient brought over by Dr Gallo, ortho resident.  They plan to admit the patient for septic joint.  Patient states he's been having increased swelling and pain of the left knee.  He denies fevers.    The history is provided by the patient.       Review of Systems   Constitutional:  Positive for activity change.   HENT: Negative.     Respiratory: Negative.     Cardiovascular: Negative.    Gastrointestinal: Negative.    Musculoskeletal:  Positive for arthralgias and joint swelling.   Skin:  Positive for color change.   Neurological: Negative.    Psychiatric/Behavioral: Negative.         Physical Exam  Vitals and nursing note reviewed.   Constitutional:       Appearance: Normal appearance. He is well-developed and normal weight.   HENT:      Head: Normocephalic and atraumatic.   Eyes:      Pupils: Pupils are equal, round, and reactive to light.   Cardiovascular:      Rate and Rhythm: Normal rate and regular rhythm.      Heart sounds: Normal heart sounds. No murmur heard.  Pulmonary:      Effort: Pulmonary effort is normal. No respiratory distress.      Breath sounds: Normal breath sounds. No wheezing or rales.   Abdominal:      General: Bowel sounds are normal.      Palpations: Abdomen is soft.      Tenderness: There is no abdominal tenderness. There is no guarding or rebound.   Musculoskeletal:         General: Swelling and tenderness present.      Cervical back: Normal range of motion and neck supple.      Comments: L knee with decreased ROM  Antalgic gait noted   Skin:     General: Skin is warm and dry.      Capillary Refill: Capillary refill takes less than 2 seconds.   Neurological:      General: No focal deficit present.      Mental Status: He is alert and oriented to person, place, and time. Mental status is at baseline.      Cranial Nerves: No cranial nerve deficit.      Coordination: Coordination normal.   Psychiatric:         Mood and Affect: Mood normal.         Behavior: Behavior normal.         Thought

## 2025-06-26 ENCOUNTER — ANESTHESIA EVENT (OUTPATIENT)
Dept: OPERATING ROOM | Age: 70
End: 2025-06-26
Payer: MEDICARE

## 2025-06-26 ENCOUNTER — ANESTHESIA (OUTPATIENT)
Dept: OPERATING ROOM | Age: 70
End: 2025-06-26
Payer: MEDICARE

## 2025-06-26 LAB
ANION GAP SERPL CALCULATED.3IONS-SCNC: 17 MMOL/L (ref 7–16)
BASOPHILS # BLD: 0 K/UL (ref 0–0.2)
BASOPHILS NFR BLD: 0 % (ref 0–2)
BUN SERPL-MCNC: 14 MG/DL (ref 8–23)
CALCIUM SERPL-MCNC: 8.9 MG/DL (ref 8.8–10.2)
CHLORIDE SERPL-SCNC: 100 MMOL/L (ref 98–107)
CO2 SERPL-SCNC: 18 MMOL/L (ref 22–29)
CREAT SERPL-MCNC: 1 MG/DL (ref 0.7–1.2)
EOSINOPHIL # BLD: 0 K/UL (ref 0.05–0.5)
EOSINOPHILS RELATIVE PERCENT: 0 % (ref 0–6)
ERYTHROCYTE [DISTWIDTH] IN BLOOD BY AUTOMATED COUNT: 14.6 % (ref 11.5–15)
GFR, ESTIMATED: 86 ML/MIN/1.73M2
GLUCOSE SERPL-MCNC: 109 MG/DL (ref 74–99)
HCT VFR BLD AUTO: 43.9 % (ref 37–54)
HGB BLD-MCNC: 14.1 G/DL (ref 12.5–16.5)
LYMPHOCYTES NFR BLD: 0.45 K/UL (ref 1.5–4)
LYMPHOCYTES RELATIVE PERCENT: 3 % (ref 20–42)
MCH RBC QN AUTO: 27.4 PG (ref 26–35)
MCHC RBC AUTO-ENTMCNC: 32.1 G/DL (ref 32–34.5)
MCV RBC AUTO: 85.2 FL (ref 80–99.9)
MONOCYTES NFR BLD: 0.22 K/UL (ref 0.1–0.95)
MONOCYTES NFR BLD: 2 % (ref 2–12)
NEUTROPHILS NFR BLD: 95 % (ref 43–80)
NEUTS SEG NFR BLD: 12.43 K/UL (ref 1.8–7.3)
PLATELET # BLD AUTO: 232 K/UL (ref 130–450)
PMV BLD AUTO: 9.9 FL (ref 7–12)
POTASSIUM SERPL-SCNC: 4.4 MMOL/L (ref 3.5–5.1)
RBC # BLD AUTO: 5.15 M/UL (ref 3.8–5.8)
RBC # BLD: NORMAL 10*6/UL
SODIUM SERPL-SCNC: 135 MMOL/L (ref 136–145)
WBC OTHER # BLD: 13.1 K/UL (ref 4.5–11.5)

## 2025-06-26 PROCEDURE — 2500000003 HC RX 250 WO HCPCS

## 2025-06-26 PROCEDURE — 6360000002 HC RX W HCPCS: Performed by: INTERNAL MEDICINE

## 2025-06-26 PROCEDURE — 87205 SMEAR GRAM STAIN: CPT

## 2025-06-26 PROCEDURE — 6360000002 HC RX W HCPCS: Performed by: NURSE ANESTHETIST, CERTIFIED REGISTERED

## 2025-06-26 PROCEDURE — 36415 COLL VENOUS BLD VENIPUNCTURE: CPT

## 2025-06-26 PROCEDURE — 6360000002 HC RX W HCPCS

## 2025-06-26 PROCEDURE — 3700000001 HC ADD 15 MINUTES (ANESTHESIA): Performed by: ORTHOPAEDIC SURGERY

## 2025-06-26 PROCEDURE — 87116 MYCOBACTERIA CULTURE: CPT

## 2025-06-26 PROCEDURE — 29871 ARTHRS KNEE SURG FOR INFCTJ: CPT | Performed by: ORTHOPAEDIC SURGERY

## 2025-06-26 PROCEDURE — 87070 CULTURE OTHR SPECIMN AEROBIC: CPT

## 2025-06-26 PROCEDURE — 80048 BASIC METABOLIC PNL TOTAL CA: CPT

## 2025-06-26 PROCEDURE — 87206 SMEAR FLUORESCENT/ACID STAI: CPT

## 2025-06-26 PROCEDURE — 2060000000 HC ICU INTERMEDIATE R&B

## 2025-06-26 PROCEDURE — 2500000003 HC RX 250 WO HCPCS: Performed by: INTERNAL MEDICINE

## 2025-06-26 PROCEDURE — 3600000013 HC SURGERY LEVEL 3 ADDTL 15MIN: Performed by: ORTHOPAEDIC SURGERY

## 2025-06-26 PROCEDURE — 0S9D4ZZ DRAINAGE OF LEFT KNEE JOINT, PERCUTANEOUS ENDOSCOPIC APPROACH: ICD-10-PCS | Performed by: ORTHOPAEDIC SURGERY

## 2025-06-26 PROCEDURE — 85025 COMPLETE CBC W/AUTO DIFF WBC: CPT

## 2025-06-26 PROCEDURE — 6360000002 HC RX W HCPCS: Performed by: ORTHOPAEDIC SURGERY

## 2025-06-26 PROCEDURE — 7100000000 HC PACU RECOVERY - FIRST 15 MIN: Performed by: ORTHOPAEDIC SURGERY

## 2025-06-26 PROCEDURE — 97110 THERAPEUTIC EXERCISES: CPT | Performed by: PHYSICAL THERAPIST

## 2025-06-26 PROCEDURE — 6370000000 HC RX 637 (ALT 250 FOR IP): Performed by: INTERNAL MEDICINE

## 2025-06-26 PROCEDURE — 3600000003 HC SURGERY LEVEL 3 BASE: Performed by: ORTHOPAEDIC SURGERY

## 2025-06-26 PROCEDURE — 3700000000 HC ANESTHESIA ATTENDED CARE: Performed by: ORTHOPAEDIC SURGERY

## 2025-06-26 PROCEDURE — 2709999900 HC NON-CHARGEABLE SUPPLY: Performed by: ORTHOPAEDIC SURGERY

## 2025-06-26 PROCEDURE — 6370000000 HC RX 637 (ALT 250 FOR IP)

## 2025-06-26 PROCEDURE — 87102 FUNGUS ISOLATION CULTURE: CPT

## 2025-06-26 PROCEDURE — 2580000003 HC RX 258: Performed by: NURSE ANESTHETIST, CERTIFIED REGISTERED

## 2025-06-26 PROCEDURE — 97161 PT EVAL LOW COMPLEX 20 MIN: CPT | Performed by: PHYSICAL THERAPIST

## 2025-06-26 PROCEDURE — 7100000001 HC PACU RECOVERY - ADDTL 15 MIN: Performed by: ORTHOPAEDIC SURGERY

## 2025-06-26 PROCEDURE — 87075 CULTR BACTERIA EXCEPT BLOOD: CPT

## 2025-06-26 RX ORDER — HYDROCODONE BITARTRATE AND ACETAMINOPHEN 5; 325 MG/1; MG/1
1 TABLET ORAL EVERY 6 HOURS PRN
Refills: 0 | Status: DISCONTINUED | OUTPATIENT
Start: 2025-06-26 | End: 2025-06-29 | Stop reason: HOSPADM

## 2025-06-26 RX ORDER — MIDAZOLAM HYDROCHLORIDE 1 MG/ML
2 INJECTION, SOLUTION INTRAMUSCULAR; INTRAVENOUS
Status: DISCONTINUED | OUTPATIENT
Start: 2025-06-26 | End: 2025-06-26 | Stop reason: HOSPADM

## 2025-06-26 RX ORDER — SODIUM CHLORIDE, SODIUM LACTATE, POTASSIUM CHLORIDE, CALCIUM CHLORIDE 600; 310; 30; 20 MG/100ML; MG/100ML; MG/100ML; MG/100ML
INJECTION, SOLUTION INTRAVENOUS
Status: DISCONTINUED | OUTPATIENT
Start: 2025-06-26 | End: 2025-06-26 | Stop reason: SDUPTHER

## 2025-06-26 RX ORDER — SODIUM CHLORIDE 0.9 % (FLUSH) 0.9 %
5-40 SYRINGE (ML) INJECTION PRN
Status: DISCONTINUED | OUTPATIENT
Start: 2025-06-26 | End: 2025-06-26 | Stop reason: HOSPADM

## 2025-06-26 RX ORDER — HYDRALAZINE HYDROCHLORIDE 20 MG/ML
10 INJECTION INTRAMUSCULAR; INTRAVENOUS
Status: DISCONTINUED | OUTPATIENT
Start: 2025-06-26 | End: 2025-06-26 | Stop reason: HOSPADM

## 2025-06-26 RX ORDER — LEVOTHYROXINE SODIUM 25 UG/1
25 TABLET ORAL DAILY
Status: DISCONTINUED | OUTPATIENT
Start: 2025-06-26 | End: 2025-06-29 | Stop reason: HOSPADM

## 2025-06-26 RX ORDER — LABETALOL HYDROCHLORIDE 5 MG/ML
10 INJECTION, SOLUTION INTRAVENOUS
Status: DISCONTINUED | OUTPATIENT
Start: 2025-06-26 | End: 2025-06-26 | Stop reason: HOSPADM

## 2025-06-26 RX ORDER — SENNOSIDES 8.6 MG
325 CAPSULE ORAL DAILY
Status: DISCONTINUED | OUTPATIENT
Start: 2025-06-26 | End: 2025-06-29 | Stop reason: HOSPADM

## 2025-06-26 RX ORDER — DIPHENHYDRAMINE HYDROCHLORIDE 50 MG/ML
12.5 INJECTION, SOLUTION INTRAMUSCULAR; INTRAVENOUS
Status: DISCONTINUED | OUTPATIENT
Start: 2025-06-26 | End: 2025-06-26 | Stop reason: HOSPADM

## 2025-06-26 RX ORDER — DROPERIDOL 2.5 MG/ML
0.62 INJECTION, SOLUTION INTRAMUSCULAR; INTRAVENOUS
Status: DISCONTINUED | OUTPATIENT
Start: 2025-06-26 | End: 2025-06-26 | Stop reason: HOSPADM

## 2025-06-26 RX ORDER — IPRATROPIUM BROMIDE AND ALBUTEROL SULFATE 2.5; .5 MG/3ML; MG/3ML
1 SOLUTION RESPIRATORY (INHALATION)
Status: DISCONTINUED | OUTPATIENT
Start: 2025-06-26 | End: 2025-06-26

## 2025-06-26 RX ORDER — FENTANYL CITRATE 0.05 MG/ML
25 INJECTION, SOLUTION INTRAMUSCULAR; INTRAVENOUS EVERY 5 MIN PRN
Status: DISCONTINUED | OUTPATIENT
Start: 2025-06-26 | End: 2025-06-26 | Stop reason: HOSPADM

## 2025-06-26 RX ORDER — NALOXONE HYDROCHLORIDE 0.4 MG/ML
INJECTION, SOLUTION INTRAMUSCULAR; INTRAVENOUS; SUBCUTANEOUS PRN
Status: DISCONTINUED | OUTPATIENT
Start: 2025-06-26 | End: 2025-06-26 | Stop reason: HOSPADM

## 2025-06-26 RX ORDER — MIDAZOLAM HYDROCHLORIDE 1 MG/ML
INJECTION, SOLUTION INTRAMUSCULAR; INTRAVENOUS
Status: DISCONTINUED | OUTPATIENT
Start: 2025-06-26 | End: 2025-06-26 | Stop reason: SDUPTHER

## 2025-06-26 RX ORDER — ASPIRIN 325 MG
325 TABLET ORAL 2 TIMES DAILY
Qty: 60 TABLET | Refills: 0 | Status: SHIPPED | OUTPATIENT
Start: 2025-06-26 | End: 2025-07-26

## 2025-06-26 RX ORDER — MEPERIDINE HYDROCHLORIDE 25 MG/ML
12.5 INJECTION INTRAMUSCULAR; INTRAVENOUS; SUBCUTANEOUS EVERY 5 MIN PRN
Status: DISCONTINUED | OUTPATIENT
Start: 2025-06-26 | End: 2025-06-26 | Stop reason: HOSPADM

## 2025-06-26 RX ORDER — PANTOPRAZOLE SODIUM 40 MG/1
40 TABLET, DELAYED RELEASE ORAL
Status: DISCONTINUED | OUTPATIENT
Start: 2025-06-26 | End: 2025-06-29 | Stop reason: HOSPADM

## 2025-06-26 RX ORDER — FENTANYL CITRATE 50 UG/ML
INJECTION, SOLUTION INTRAMUSCULAR; INTRAVENOUS
Status: DISCONTINUED | OUTPATIENT
Start: 2025-06-26 | End: 2025-06-26 | Stop reason: SDUPTHER

## 2025-06-26 RX ORDER — METOPROLOL SUCCINATE 25 MG/1
25 TABLET, EXTENDED RELEASE ORAL DAILY
Status: DISCONTINUED | OUTPATIENT
Start: 2025-06-26 | End: 2025-06-29 | Stop reason: HOSPADM

## 2025-06-26 RX ORDER — LIDOCAINE HYDROCHLORIDE 20 MG/ML
INJECTION, SOLUTION INTRAVENOUS
Status: DISCONTINUED | OUTPATIENT
Start: 2025-06-26 | End: 2025-06-26 | Stop reason: SDUPTHER

## 2025-06-26 RX ORDER — BUPIVACAINE HYDROCHLORIDE 2.5 MG/ML
INJECTION, SOLUTION EPIDURAL; INFILTRATION; INTRACAUDAL; PERINEURAL PRN
Status: DISCONTINUED | OUTPATIENT
Start: 2025-06-26 | End: 2025-06-26 | Stop reason: HOSPADM

## 2025-06-26 RX ORDER — DEXAMETHASONE SODIUM PHOSPHATE 10 MG/ML
INJECTION, SOLUTION INTRAMUSCULAR; INTRAVENOUS
Status: DISCONTINUED | OUTPATIENT
Start: 2025-06-26 | End: 2025-06-26 | Stop reason: SDUPTHER

## 2025-06-26 RX ORDER — SODIUM CHLORIDE 9 MG/ML
INJECTION, SOLUTION INTRAVENOUS
Status: DISCONTINUED | OUTPATIENT
Start: 2025-06-26 | End: 2025-06-26 | Stop reason: SDUPTHER

## 2025-06-26 RX ORDER — SODIUM CHLORIDE 0.9 % (FLUSH) 0.9 %
5-40 SYRINGE (ML) INJECTION EVERY 12 HOURS SCHEDULED
Status: DISCONTINUED | OUTPATIENT
Start: 2025-06-26 | End: 2025-06-26 | Stop reason: HOSPADM

## 2025-06-26 RX ORDER — IPRATROPIUM BROMIDE AND ALBUTEROL SULFATE 2.5; .5 MG/3ML; MG/3ML
1 SOLUTION RESPIRATORY (INHALATION) EVERY 4 HOURS PRN
Status: DISCONTINUED | OUTPATIENT
Start: 2025-06-26 | End: 2025-06-26 | Stop reason: HOSPADM

## 2025-06-26 RX ORDER — PROPOFOL 10 MG/ML
INJECTION, EMULSION INTRAVENOUS
Status: DISCONTINUED | OUTPATIENT
Start: 2025-06-26 | End: 2025-06-26 | Stop reason: SDUPTHER

## 2025-06-26 RX ORDER — PROCHLORPERAZINE EDISYLATE 5 MG/ML
5 INJECTION INTRAMUSCULAR; INTRAVENOUS
Status: DISCONTINUED | OUTPATIENT
Start: 2025-06-26 | End: 2025-06-26 | Stop reason: HOSPADM

## 2025-06-26 RX ORDER — GLYCOPYRROLATE 1 MG/5 ML
SYRINGE (ML) INTRAVENOUS
Status: DISCONTINUED | OUTPATIENT
Start: 2025-06-26 | End: 2025-06-26 | Stop reason: SDUPTHER

## 2025-06-26 RX ORDER — ESOMEPRAZOLE MAGNESIUM 20 MG/1
20 TABLET, DELAYED RELEASE ORAL
Status: DISCONTINUED | OUTPATIENT
Start: 2025-06-26 | End: 2025-06-26 | Stop reason: CLARIF

## 2025-06-26 RX ORDER — ONDANSETRON 2 MG/ML
INJECTION INTRAMUSCULAR; INTRAVENOUS
Status: DISCONTINUED | OUTPATIENT
Start: 2025-06-26 | End: 2025-06-26 | Stop reason: SDUPTHER

## 2025-06-26 RX ORDER — SODIUM CHLORIDE 9 MG/ML
INJECTION, SOLUTION INTRAVENOUS PRN
Status: DISCONTINUED | OUTPATIENT
Start: 2025-06-26 | End: 2025-06-26 | Stop reason: HOSPADM

## 2025-06-26 RX ADMIN — ONDANSETRON 4 MG: 2 INJECTION, SOLUTION INTRAMUSCULAR; INTRAVENOUS at 07:33

## 2025-06-26 RX ADMIN — MORPHINE SULFATE 2 MG: 2 INJECTION, SOLUTION INTRAMUSCULAR; INTRAVENOUS at 20:20

## 2025-06-26 RX ADMIN — FENTANYL CITRATE 50 MCG: 50 INJECTION, SOLUTION INTRAMUSCULAR; INTRAVENOUS at 07:12

## 2025-06-26 RX ADMIN — MIDAZOLAM 2 MG: 1 INJECTION INTRAMUSCULAR; INTRAVENOUS at 07:00

## 2025-06-26 RX ADMIN — LIDOCAINE HYDROCHLORIDE 100 MG: 20 INJECTION, SOLUTION INTRAVENOUS at 07:12

## 2025-06-26 RX ADMIN — LEVOTHYROXINE SODIUM 25 MCG: 0.03 TABLET ORAL at 09:23

## 2025-06-26 RX ADMIN — MORPHINE SULFATE 2 MG: 2 INJECTION, SOLUTION INTRAMUSCULAR; INTRAVENOUS at 05:12

## 2025-06-26 RX ADMIN — SODIUM CHLORIDE: 9 INJECTION, SOLUTION INTRAVENOUS at 06:58

## 2025-06-26 RX ADMIN — ASPIRIN 325 MG: 325 TABLET, COATED ORAL at 13:08

## 2025-06-26 RX ADMIN — FENTANYL CITRATE 50 MCG: 50 INJECTION, SOLUTION INTRAMUSCULAR; INTRAVENOUS at 07:00

## 2025-06-26 RX ADMIN — WATER 1000 MG: 1 INJECTION INTRAMUSCULAR; INTRAVENOUS; SUBCUTANEOUS at 15:39

## 2025-06-26 RX ADMIN — PROPOFOL 200 MG: 10 INJECTION, EMULSION INTRAVENOUS at 07:12

## 2025-06-26 RX ADMIN — PANTOPRAZOLE SODIUM 40 MG: 40 TABLET, DELAYED RELEASE ORAL at 10:13

## 2025-06-26 RX ADMIN — HYDROCODONE BITARTRATE AND ACETAMINOPHEN 1 TABLET: 5; 325 TABLET ORAL at 15:08

## 2025-06-26 RX ADMIN — Medication 0.2 MG: at 06:59

## 2025-06-26 RX ADMIN — OXYCODONE 5 MG: 5 TABLET ORAL at 10:08

## 2025-06-26 RX ADMIN — CEFAZOLIN SODIUM 2000 MG: 1 POWDER, FOR SOLUTION INTRAMUSCULAR; INTRAVENOUS at 07:13

## 2025-06-26 RX ADMIN — Medication 10 ML: at 09:26

## 2025-06-26 RX ADMIN — Medication 10 ML: at 20:20

## 2025-06-26 RX ADMIN — DEXAMETHASONE SODIUM PHOSPHATE 10 MG: 10 INJECTION, SOLUTION INTRAMUSCULAR; INTRAVENOUS at 07:17

## 2025-06-26 RX ADMIN — METOPROLOL SUCCINATE 25 MG: 25 TABLET, EXTENDED RELEASE ORAL at 09:23

## 2025-06-26 RX ADMIN — SODIUM CHLORIDE, POTASSIUM CHLORIDE, SODIUM LACTATE AND CALCIUM CHLORIDE: 600; 310; 30; 20 INJECTION, SOLUTION INTRAVENOUS at 07:51

## 2025-06-26 RX ADMIN — CEFAZOLIN SODIUM 2000 MG: 1 POWDER, FOR SOLUTION INTRAMUSCULAR; INTRAVENOUS at 23:33

## 2025-06-26 RX ADMIN — HYDROMORPHONE HYDROCHLORIDE 1 MG: 1 INJECTION, SOLUTION INTRAMUSCULAR; INTRAVENOUS; SUBCUTANEOUS at 07:38

## 2025-06-26 RX ADMIN — HYDROCODONE BITARTRATE AND ACETAMINOPHEN 1 TABLET: 5; 325 TABLET ORAL at 23:31

## 2025-06-26 ASSESSMENT — PAIN DESCRIPTION - DESCRIPTORS
DESCRIPTORS: ACHING
DESCRIPTORS: ACHING

## 2025-06-26 ASSESSMENT — PAIN DESCRIPTION - ORIENTATION
ORIENTATION: LEFT

## 2025-06-26 ASSESSMENT — PAIN SCALES - GENERAL
PAINLEVEL_OUTOF10: 6
PAINLEVEL_OUTOF10: 8
PAINLEVEL_OUTOF10: 8
PAINLEVEL_OUTOF10: 9
PAINLEVEL_OUTOF10: 7

## 2025-06-26 ASSESSMENT — PAIN DESCRIPTION - LOCATION
LOCATION: KNEE

## 2025-06-26 NOTE — OP NOTE
Operative Note      Patient: Jason Dolan  YOB: 1955  MRN: [id number]    Date of Procedure: 6/26/2025    Pre-Op Diagnosis Codes:      * Pyogenic arthritis of left knee joint, due to unspecified organism (HCC) [M00.9]    Post-Op Diagnosis: Post-Op Diagnosis Codes:     * Pyogenic arthritis of left knee joint, due to unspecified organism (HCC) [M00.9]       Procedure(s):  ARTHROSCOPIC INCISION AND DRAINAGE LEFT KNEE    Surgeon(s):  Ab Benites DO    Assistant:   First Assistant: Pallante, Anthony  Resident: Fili Gallo DO; Sanchez Butts DO    Anesthesia: General    Estimated Blood Loss (mL): Minimal    Complications: None    Specimens:   ID Type Source Tests Collected by Time Destination   1 : SYNOVIAL FLUID LEFT KNEE Body Fluid Synovial CULTURE, ANAEROBIC, CULTURE, FUNGUS, GRAM STAIN, CULTURE, SURGICAL, CULTURE WITH SMEAR, ACID FAST BACILLIUS Ab Benites DO 6/26/2025 0730        Implants:  * No implants in log *      Drains: * No LDAs found *    Findings:  Infection Present At Time Of Surgery (PATOS) (choose all levels that have infection present):  - Deep Infection (muscle/fascia) present as evidenced by fluid consistent with infection  Other Findings: Extensive tricompartmental osteoarthritis    Detailed Description of Procedure:       The patient was taken to the operative suite and was given a general anesthesia.  The patient was transferred to operating table by multiple individuals in a safe manner.  The left leg was identified with a preoperative time out, placed a tourniquet on the left thigh, placed the leg in a leg nick, prepped and draped the leg in a sterile fashion     The left knee was outlined identifying the anteromedial and anterolateral anticipated portal sites.  Tourniquet was placed however it was not inflated due to the patient's septic knee presentation.       The procedure began with incision to the anterolateral portal making sure to penetrate the knee

## 2025-06-26 NOTE — ANESTHESIA PRE PROCEDURE
Department of Anesthesiology  Preprocedure Note       Name:  Jason Dolan   Age:  69 y.o.  :  1955                                          MRN:  [id number]         Date:  2025      Surgeon: Surgeon(s):  Ab Benites DO    Procedure: Procedure(s):  ARTHROSCOPIC INCISION AND DRAINAGE LEFT KNEE    Medications prior to admission:   Prior to Admission medications    Medication Sig Start Date End Date Taking? Authorizing Provider   HYDROcodone-acetaminophen (NORCO) 5-325 MG per tablet Take 1 tablet by mouth every 6 hours as needed for Pain (Intended supply: 7 days. Take lowest dose possible to manage pain) for up to 7 days. Max Daily Amount: 4 tablets 25 Yes Ab Benites DO   metoprolol succinate (TOPROL XL) 25 MG extended release tablet Take 1 tablet by mouth daily   Yes Provider, MD Rosana   atorvastatin (LIPITOR) 10 MG tablet TAKE 1 TABLET BY MOUTH ONCE DAILY AS DIRECTED FOR 30 DAYS 21  Yes Provider, MD Rosana   acetaminophen (TYLENOL) 500 MG tablet Take 2 tablets by mouth daily as needed for Pain   Yes Provider, MD Rosana   fenofibrate (TRICOR) 145 MG tablet TAKE 1 TABLET BY MOUTH ONCE DAILY AS DIRECTED FOR 30 DAYS 20  Yes Provider, MD Rosana   levothyroxine (SYNTHROID) 25 MCG tablet TAKE 1 TABLET BY MOUTH ONCE DAILY AS DIRECTED FOR 30 DAYS 20  Yes Provider, MD Rosana   Esomeprazole Magnesium (NEXIUM 24HR) 20 MG TBEC Take 20 mg by mouth daily am   Yes Provider, MD Rosana   dicyclomine (BENTYL) 20 MG tablet Take 1 tablet by mouth 4 times daily  Patient not taking: Reported on 2025 3/11/25   Zac Garrido DO   ketorolac (TORADOL) 10 MG tablet Take 1 tablet by mouth every 6 hours as needed for Pain  Patient not taking: Reported on 2025 3/11/25   Zac Garrido DO   celecoxib (CELEBREX) 200 MG capsule Take 1 capsule by mouth 2 times daily  Patient not taking: Reported on 2025   Ab Benites DO   diclofenac

## 2025-06-26 NOTE — DISCHARGE INSTRUCTIONS
[facility] Department of Orthopedics  [address]   [place] [address]    MD Magdaleno Acosta DO K Brian Williams, DO    Orthopaedics Discharge Instructions   WBAT on left lower extremity  Pain medication Per Prescriptions  Contact Office for Medication Refill- [phone]  Office can refill pain med every 7 days  If patient discharging to facility then pain control will be continued per facility physician  Ice to operative/injured site for 15-30 minutes of each hour for next 5 days    Recommend that you continue to ice the area 2-3 times per day after this   Elevate operative/injured limb on 2 pillows at home  Goal is to have limb above the heart if able  Wound care -dressings remain clean dry intact.  On postoperative day 7 can be removed and surgical site can be clean with soapy water.  DVT prophylaxis,  mg BID.      Follow Up in Office in 2 weeks.       Call the office at [phone] for directions or with any questions.  Watch for these significant complications.  Call physician if they or any other problems occur:  Fever over 101°, redness, swelling or warmth at the operative site  Unrelieved nausea    Foul smelling or cloudy drainage at the operative site   Unrelieved pain    Blood soaked dressing. (Some oozing may be normal)     Numb, pale, blue, cold or tingling extremity

## 2025-06-26 NOTE — PLAN OF CARE
Problem: Discharge Planning  Goal: Discharge to home or other facility with appropriate resources  6/26/2025 1048 by Sherman Metcalf, RN  Outcome: Progressing  Flowsheets (Taken 6/26/2025 0800)  Discharge to home or other facility with appropriate resources: Identify barriers to discharge with patient and caregiver  6/26/2025 0505 by Garrick Carranza RN  Outcome: Progressing  Flowsheets (Taken 6/25/2025 2000)  Discharge to home or other facility with appropriate resources: Identify barriers to discharge with patient and caregiver     Problem: Safety - Adult  Goal: Free from fall injury  6/26/2025 1048 by Sherman Metcalf RN  Outcome: Progressing  6/26/2025 0505 by Garrick Carranza RN  Outcome: Progressing  Flowsheets (Taken 6/25/2025 2000)  Free From Fall Injury: Instruct family/caregiver on patient safety     Problem: Pain  Goal: Verbalizes/displays adequate comfort level or baseline comfort level  6/26/2025 1048 by Sherman Metcalf RN  Outcome: Progressing  6/26/2025 0505 by Garrick Carranza RN  Outcome: Progressing

## 2025-06-26 NOTE — CONSULTS
Department of Internal Medicine        CHIEF COMPLAINT:      Reason for Admission:      HISTORY OF PRESENT ILLNESS:      The patient is a 69 y.o. male who presents with having outpatient evaluation with orthopedics with the patient having a knee aspiration and was positive for WBCs and the patient was admitted in the hospital to have arthroscopic incision and drainage left knee fluid.  This was performed earlier this morning.  BUN/creatinine was 14/1.0 with CO2 electrolytes 18.  WBC 13.1 with open of 14.1..  Temperature 98 with heart rate 100 blood pressure 142/83.  O2 sat 97% room air at rest.  Patient has expected postop discomfort.  Patient denies any prior chest pain, abdominal pain, nausea vomiting or unusual shortness of breath.    Past Medical History:    Past Medical History:   Diagnosis Date    Benign gastrointestinal stromal tumor (GIST)     Cancer (HCC)     skin  squamos    Hyperlipidemia     Thyroid disease      Past Surgical History:    Past Surgical History:   Procedure Laterality Date    COLECTOMY N/A 5/18/2021    ROBOTIC  LAPAROSCOPIC ASSISTED VENTRAL HERNIA REPAIR WITH MESH AND LYSIS OF ADHESIONS performed by Lit Haynes MD at [facility] [place]    COLONOSCOPY      ENDOSCOPY, COLON, DIAGNOSTIC      HEEL SPUR SURGERY Left 7/1/2021    RESECTION RETROCALCANEAL EXOSTOSIS LEFT FOOT, REPAIR ACHILLES TENDON LEFT (CPT 27293) (ARTHREX) performed by Bonilla Vargas Jr., DPM at [facility] OR    HERNIA REPAIR  1998    KNEE CARTILAGE SURGERY Right 2020    SKIN BIOPSY Right     x2 - right ankle - skin cancer     SMALL INTESTINE SURGERY  1998       Medications Prior to Admission:    @  Prior to Admission medications    Medication Sig Start Date End Date Taking? Authorizing Provider   aspirin (STEPHANI ASPIRIN) 325 MG tablet Take 1 tablet by mouth in the morning and at bedtime 6/26/25 7/26/25 Yes Sanchez Butts,    HYDROcodone-acetaminophen (NORCO) 5-325 MG per tablet Take 1 tablet by mouth every 6 hours as needed for 
  Micro:    Results       Procedure Component Value Units Date/Time    Culture, Anaerobic [[id number]] Collected: 06/26/25 0730    Order Status: Sent Specimen: Body Fluid from Synovial Updated: 06/26/25 0753    Culture, Fungus [[id number]] Collected: 06/26/25 0730    Order Status: Sent Specimen: Body Fluid from Synovial Updated: 06/26/25 0752    Gram Stain [[id number]] Collected: 06/26/25 0730    Order Status: Canceled Specimen: Body Fluid from Synovial     Culture, Surgical [[id number]] Collected: 06/26/25 0730    Order Status: Sent Specimen: Body Fluid from Synovial Updated: 06/26/25 0750    Culture with Smear, Acid Fast Bacillius [[id number]] Collected: 06/26/25 0730    Order Status: Sent Specimen: Body Fluid from Synovial Updated: 06/26/25 1045    Culture, Body Fluid (with Gram Stain) [[id number]] Collected: 06/24/25 0835    Order Status: Completed Updated: 06/26/25 1032     Specimen Description .SYNOVIAL FLUID     Direct Exam Gram stain performed on unspun fluid.      MANY Polymorphonuclear leukocytes      NO EPITHELIAL CELLS      NO ORGANISMS SEEN     Culture NO GROWTH 2 DAYS    Culture, Body Fluid (with Gram Stain) [[id number]] Collected: 06/24/25    Order Status: Sent Specimen: Body Fluid from Synovial Fluid             Problem list of patient      Patient Active Problem List   Diagnosis Code    Retrocalcaneal exostosis M89.8X7    Incarcerated ventral hernia K43.6    Achilles tendonosis of left lower extremity M67.88    Septic joint (HCC) M00.9         ASSESSMENT AND PLAN   Sepsis  Fever leukocytosis tachycardia   Pyogenic arthritis of left knee joint, due to unspecified organism   ARTHROSCOPIC INCISION AND DRAINAGE LEFT KNEE  -await cx  On ancef    Infection Control Recommendations   [place] Precautions  [place] Catheter  Central Line  Wound care    Please note that 30 minutes were spent on this case in regards to the intensity and complexity inherent to hospital inpatient or observation care associated

## 2025-06-26 NOTE — ANESTHESIA POSTPROCEDURE EVALUATION
Department of Anesthesiology  Postprocedure Note    Patient: Jason Dolan  MRN: [id number]  YOB: 1955  Date of evaluation: 6/26/2025    Procedure Summary       Date: 06/26/25 Room / Location: [address]    Anesthesia Start: 0658 Anesthesia Stop: 0808    Procedure: ARTHROSCOPIC INCISION AND DRAINAGE LEFT KNEE (Left: Knee) Diagnosis:       Pyogenic arthritis of left knee joint, due to unspecified organism (HCC)      (Pyogenic arthritis of left knee joint, due to unspecified organism (HCC) [M00.9])    Surgeons: Ab Benites DO Responsible Provider: Shadi Bryant DO    Anesthesia Type: general ASA Status: 3            Anesthesia Type: No value filed.    Coby Phase I: Coby Score: 10    Coby Phase II:      Anesthesia Post Evaluation    Patient location during evaluation: PACU  Patient participation: complete - patient participated  Level of consciousness: awake and alert  Pain score: 0  Airway patency: patent  Nausea & Vomiting: no nausea and no vomiting  Cardiovascular status: blood pressure returned to baseline and hemodynamically stable  Respiratory status: acceptable  Hydration status: stable  Pain management: adequate    No notable events documented.

## 2025-06-26 NOTE — PLAN OF CARE
Problem: Safety - Adult  Goal: Free from fall injury  Outcome: Progressing  Flowsheets (Taken 6/25/2025 2000)  Free From Fall Injury: Instruct family/caregiver on patient safety     Problem: Pain  Goal: Verbalizes/displays adequate comfort level or baseline comfort level  Outcome: Progressing

## 2025-06-27 LAB — URATE SERPL-MCNC: 6.3 MG/DL (ref 3.4–7)

## 2025-06-27 PROCEDURE — 36415 COLL VENOUS BLD VENIPUNCTURE: CPT

## 2025-06-27 PROCEDURE — 6370000000 HC RX 637 (ALT 250 FOR IP): Performed by: INTERNAL MEDICINE

## 2025-06-27 PROCEDURE — 97530 THERAPEUTIC ACTIVITIES: CPT

## 2025-06-27 PROCEDURE — 2500000003 HC RX 250 WO HCPCS: Performed by: INTERNAL MEDICINE

## 2025-06-27 PROCEDURE — 6360000002 HC RX W HCPCS: Performed by: INTERNAL MEDICINE

## 2025-06-27 PROCEDURE — 6370000000 HC RX 637 (ALT 250 FOR IP)

## 2025-06-27 PROCEDURE — 2060000000 HC ICU INTERMEDIATE R&B

## 2025-06-27 PROCEDURE — 97165 OT EVAL LOW COMPLEX 30 MIN: CPT

## 2025-06-27 PROCEDURE — 84550 ASSAY OF BLOOD/URIC ACID: CPT

## 2025-06-27 PROCEDURE — 87081 CULTURE SCREEN ONLY: CPT

## 2025-06-27 RX ADMIN — HYDROCODONE BITARTRATE AND ACETAMINOPHEN 1 TABLET: 5; 325 TABLET ORAL at 18:37

## 2025-06-27 RX ADMIN — LEVOTHYROXINE SODIUM 25 MCG: 0.03 TABLET ORAL at 06:19

## 2025-06-27 RX ADMIN — CEFAZOLIN SODIUM 2000 MG: 1 POWDER, FOR SOLUTION INTRAMUSCULAR; INTRAVENOUS at 23:40

## 2025-06-27 RX ADMIN — ACETAMINOPHEN 650 MG: 325 TABLET ORAL at 18:37

## 2025-06-27 RX ADMIN — PANTOPRAZOLE SODIUM 40 MG: 40 TABLET, DELAYED RELEASE ORAL at 06:19

## 2025-06-27 RX ADMIN — ACETAMINOPHEN 650 MG: 325 TABLET ORAL at 23:36

## 2025-06-27 RX ADMIN — METOPROLOL SUCCINATE 25 MG: 25 TABLET, EXTENDED RELEASE ORAL at 08:53

## 2025-06-27 RX ADMIN — CEFAZOLIN SODIUM 2000 MG: 1 POWDER, FOR SOLUTION INTRAMUSCULAR; INTRAVENOUS at 08:53

## 2025-06-27 RX ADMIN — OXYCODONE 5 MG: 5 TABLET ORAL at 23:36

## 2025-06-27 RX ADMIN — HYDROCODONE BITARTRATE AND ACETAMINOPHEN 1 TABLET: 5; 325 TABLET ORAL at 06:19

## 2025-06-27 RX ADMIN — CEFAZOLIN SODIUM 2000 MG: 1 POWDER, FOR SOLUTION INTRAMUSCULAR; INTRAVENOUS at 16:02

## 2025-06-27 RX ADMIN — Medication 10 ML: at 08:59

## 2025-06-27 RX ADMIN — HYDROCODONE BITARTRATE AND ACETAMINOPHEN 1 TABLET: 5; 325 TABLET ORAL at 12:21

## 2025-06-27 RX ADMIN — ACETAMINOPHEN 650 MG: 325 TABLET ORAL at 12:21

## 2025-06-27 RX ADMIN — OXYCODONE 5 MG: 5 TABLET ORAL at 08:53

## 2025-06-27 RX ADMIN — ASPIRIN 325 MG: 325 TABLET, COATED ORAL at 08:53

## 2025-06-27 RX ADMIN — OXYCODONE 5 MG: 5 TABLET ORAL at 15:25

## 2025-06-27 RX ADMIN — MORPHINE SULFATE 2 MG: 2 INJECTION, SOLUTION INTRAMUSCULAR; INTRAVENOUS at 04:21

## 2025-06-27 RX ADMIN — Medication 10 ML: at 23:46

## 2025-06-27 ASSESSMENT — PAIN DESCRIPTION - ORIENTATION
ORIENTATION: LEFT

## 2025-06-27 ASSESSMENT — PAIN DESCRIPTION - LOCATION
LOCATION: KNEE;LEG
LOCATION: KNEE;LEG;HIP
LOCATION: HIP;KNEE;LEG
LOCATION: KNEE
LOCATION: KNEE;LEG
LOCATION: KNEE
LOCATION: KNEE;LEG;HIP

## 2025-06-27 ASSESSMENT — PAIN SCALES - GENERAL
PAINLEVEL_OUTOF10: 7
PAINLEVEL_OUTOF10: 7
PAINLEVEL_OUTOF10: 6
PAINLEVEL_OUTOF10: 7
PAINLEVEL_OUTOF10: 7
PAINLEVEL_OUTOF10: 8
PAINLEVEL_OUTOF10: 8
PAINLEVEL_OUTOF10: 6
PAINLEVEL_OUTOF10: 5
PAINLEVEL_OUTOF10: 7

## 2025-06-27 ASSESSMENT — PAIN DESCRIPTION - DESCRIPTORS
DESCRIPTORS: STABBING
DESCRIPTORS: STABBING
DESCRIPTORS: STABBING;ACHING;DISCOMFORT
DESCRIPTORS: ACHING
DESCRIPTORS: ACHING;STABBING

## 2025-06-27 NOTE — PLAN OF CARE
Problem: Discharge Planning  Goal: Discharge to home or other facility with appropriate resources  Outcome: Progressing  Flowsheets (Taken 6/27/2025 0853)  Discharge to home or other facility with appropriate resources: Identify barriers to discharge with patient and caregiver     Problem: Safety - Adult  Goal: Free from fall injury  6/27/2025 1147 by Nini Harkins RN  Outcome: Progressing  Flowsheets (Taken 6/26/2025 2000 by Garrick Carranza, RN)  Free From Fall Injury: Instruct family/caregiver on patient safety  6/27/2025 0434 by Garrick Carranza RN  Outcome: Adequate for Discharge  Flowsheets (Taken 6/26/2025 2000)  Free From Fall Injury: Instruct family/caregiver on patient safety     Problem: Pain  Goal: Verbalizes/displays adequate comfort level or baseline comfort level  6/27/2025 1147 by Nini Harkins RN  Outcome: Progressing  Flowsheets (Taken 6/27/2025 0853)  Verbalizes/displays adequate comfort level or baseline comfort level: Encourage patient to monitor pain and request assistance  6/27/2025 0434 by Garrick Carranza RN  Outcome: Progressing

## 2025-06-27 NOTE — CARE COORDINATION
SW/Care Manager Note: Pt admitted for septic joint. Met with pt & spouse at bedside for coordination of care. Pt lives with spouse & independent with all adl's; works full time. ID consulted & ordered rocephin 2,000mg q24hrs for 28 days upon discharge. Per pts request referral completed to M[facility] @ [facility] & cost determined $292.00 per visit. Per pts requested completed referral to [facility], Ismael notified that out of pocket cost is $252.50 per week. Pt requesting to use  [facility] & receive IV med at home. Pt plan to self administer & spouse to assist. Per pts choice completed referral to  [facility] , accepted & confirmed pt has -0- cost for services. Pt needs a [facility] order for nursing prior to d/c. D/c anticipated on 6/30. Spouse to provide transport home. Assigned CM to follow. Electronically signed by BRIAN Maki on 6/27/2025 at 6:24 PM    Addendum: [facility] agreement needs completed and sent to [facility] prior to d/c. Electronically signed by BRIAN Maki on 6/27/2025 at 6:26 PM

## 2025-06-27 NOTE — ACP (ADVANCE CARE PLANNING)
Advance Care Planning   Healthcare Decision Maker:    Primary Decision Maker: MagdalenoDona S - [facility] - [phone]    Click here to complete Healthcare Decision Makers including selection of the Healthcare Decision Maker Relationship (ie \"Primary\").  Today we documented Decision Maker(s) consistent with Legal Next of Kin hierarchy.

## 2025-06-27 NOTE — PLAN OF CARE
Problem: Pain  Goal: Verbalizes/displays adequate comfort level or baseline comfort level  Outcome: Progressing     Problem: Safety - Adult  Goal: Free from fall injury  Outcome: Adequate for Discharge

## 2025-06-28 LAB
ALBUMIN SERPL-MCNC: 3.5 G/DL (ref 3.5–5.2)
ALP SERPL-CCNC: 70 U/L (ref 40–129)
ALT SERPL-CCNC: 22 U/L (ref 0–50)
ANION GAP SERPL CALCULATED.3IONS-SCNC: 10 MMOL/L (ref 7–16)
ASO AB SERPL-ACNC: 28 IU/ML (ref 0–200)
AST SERPL-CCNC: 23 U/L (ref 0–50)
BASOPHILS # BLD: 0.03 K/UL (ref 0–0.2)
BASOPHILS NFR BLD: 1 % (ref 0–2)
BILIRUB SERPL-MCNC: <0.2 MG/DL (ref 0–1.2)
BUN SERPL-MCNC: 16 MG/DL (ref 8–23)
CALCIUM SERPL-MCNC: 8.9 MG/DL (ref 8.8–10.2)
CHLORIDE SERPL-SCNC: 104 MMOL/L (ref 98–107)
CO2 SERPL-SCNC: 27 MMOL/L (ref 22–29)
CREAT SERPL-MCNC: 0.9 MG/DL (ref 0.7–1.2)
CRP SERPL HS-MCNC: 78.3 MG/L (ref 0–5)
EOSINOPHIL # BLD: 0.15 K/UL (ref 0.05–0.5)
EOSINOPHILS RELATIVE PERCENT: 3 % (ref 0–6)
ERYTHROCYTE [DISTWIDTH] IN BLOOD BY AUTOMATED COUNT: 14.6 % (ref 11.5–15)
ERYTHROCYTE [SEDIMENTATION RATE] IN BLOOD BY WESTERGREN METHOD: 47 MM/HR (ref 0–15)
GFR, ESTIMATED: >90 ML/MIN/1.73M2
GLUCOSE SERPL-MCNC: 81 MG/DL (ref 74–99)
HCT VFR BLD AUTO: 38.5 % (ref 37–54)
HGB BLD-MCNC: 12.2 G/DL (ref 12.5–16.5)
IMM GRANULOCYTES # BLD AUTO: <0.03 K/UL (ref 0–0.58)
IMM GRANULOCYTES NFR BLD: 0 % (ref 0–5)
LYMPHOCYTES NFR BLD: 1.58 K/UL (ref 1.5–4)
LYMPHOCYTES RELATIVE PERCENT: 27 % (ref 20–42)
MCH RBC QN AUTO: 27.5 PG (ref 26–35)
MCHC RBC AUTO-ENTMCNC: 31.7 G/DL (ref 32–34.5)
MCV RBC AUTO: 86.9 FL (ref 80–99.9)
MONOCYTES NFR BLD: 0.5 K/UL (ref 0.1–0.95)
MONOCYTES NFR BLD: 9 % (ref 2–12)
NEUTROPHILS NFR BLD: 61 % (ref 43–80)
NEUTS SEG NFR BLD: 3.62 K/UL (ref 1.8–7.3)
PLATELET # BLD AUTO: 209 K/UL (ref 130–450)
PMV BLD AUTO: 9.9 FL (ref 7–12)
POTASSIUM SERPL-SCNC: 4.2 MMOL/L (ref 3.5–5.1)
PROT SERPL-MCNC: 6.5 G/DL (ref 6.4–8.3)
RBC # BLD AUTO: 4.43 M/UL (ref 3.8–5.8)
SODIUM SERPL-SCNC: 141 MMOL/L (ref 136–145)
WBC OTHER # BLD: 5.9 K/UL (ref 4.5–11.5)

## 2025-06-28 PROCEDURE — 6370000000 HC RX 637 (ALT 250 FOR IP): Performed by: INTERNAL MEDICINE

## 2025-06-28 PROCEDURE — 85025 COMPLETE CBC W/AUTO DIFF WBC: CPT

## 2025-06-28 PROCEDURE — 80053 COMPREHEN METABOLIC PANEL: CPT

## 2025-06-28 PROCEDURE — 86063 ANTISTREPTOLYSIN O SCREEN: CPT

## 2025-06-28 PROCEDURE — 36415 COLL VENOUS BLD VENIPUNCTURE: CPT

## 2025-06-28 PROCEDURE — 2060000000 HC ICU INTERMEDIATE R&B

## 2025-06-28 PROCEDURE — 6370000000 HC RX 637 (ALT 250 FOR IP): Performed by: SPECIALIST

## 2025-06-28 PROCEDURE — 2500000003 HC RX 250 WO HCPCS: Performed by: INTERNAL MEDICINE

## 2025-06-28 PROCEDURE — 6360000002 HC RX W HCPCS: Performed by: INTERNAL MEDICINE

## 2025-06-28 PROCEDURE — 93005 ELECTROCARDIOGRAM TRACING: CPT | Performed by: SPECIALIST

## 2025-06-28 PROCEDURE — 86140 C-REACTIVE PROTEIN: CPT

## 2025-06-28 PROCEDURE — 6370000000 HC RX 637 (ALT 250 FOR IP)

## 2025-06-28 PROCEDURE — 85652 RBC SED RATE AUTOMATED: CPT

## 2025-06-28 RX ORDER — CEPHALEXIN 500 MG/1
500 CAPSULE ORAL 4 TIMES DAILY
Qty: 112 CAPSULE | Refills: 0 | Status: SHIPPED | OUTPATIENT
Start: 2025-06-28 | End: 2025-06-29

## 2025-06-28 RX ORDER — COLCHICINE 0.6 MG/1
1.2 TABLET ORAL ONCE
Status: COMPLETED | OUTPATIENT
Start: 2025-06-28 | End: 2025-06-28

## 2025-06-28 RX ORDER — DOXYCYCLINE 100 MG/1
100 CAPSULE ORAL EVERY 12 HOURS SCHEDULED
Qty: 28 CAPSULE | Refills: 0 | Status: SHIPPED | OUTPATIENT
Start: 2025-06-28 | End: 2025-06-29

## 2025-06-28 RX ORDER — MELOXICAM 7.5 MG/1
7.5 TABLET ORAL DAILY
Status: DISCONTINUED | OUTPATIENT
Start: 2025-06-28 | End: 2025-06-29 | Stop reason: HOSPADM

## 2025-06-28 RX ORDER — COLCHICINE 0.6 MG/1
0.6 TABLET ORAL DAILY
Status: DISCONTINUED | OUTPATIENT
Start: 2025-06-29 | End: 2025-06-29 | Stop reason: HOSPADM

## 2025-06-28 RX ORDER — DOXYCYCLINE 100 MG/1
100 CAPSULE ORAL EVERY 12 HOURS SCHEDULED
Status: DISCONTINUED | OUTPATIENT
Start: 2025-06-28 | End: 2025-06-29 | Stop reason: HOSPADM

## 2025-06-28 RX ORDER — GUAIFENESIN/DEXTROMETHORPHAN 100-10MG/5
10 SYRUP ORAL EVERY 4 HOURS PRN
Status: DISCONTINUED | OUTPATIENT
Start: 2025-06-28 | End: 2025-06-29 | Stop reason: HOSPADM

## 2025-06-28 RX ORDER — BENZONATATE 100 MG/1
100 CAPSULE ORAL 3 TIMES DAILY
Status: DISCONTINUED | OUTPATIENT
Start: 2025-06-28 | End: 2025-06-29 | Stop reason: HOSPADM

## 2025-06-28 RX ADMIN — CEPHALEXIN 500 MG: 250 CAPSULE ORAL at 23:50

## 2025-06-28 RX ADMIN — POLYETHYLENE GLYCOL 3350 17 G: 17 POWDER, FOR SOLUTION ORAL at 05:48

## 2025-06-28 RX ADMIN — CEFAZOLIN SODIUM 2000 MG: 1 POWDER, FOR SOLUTION INTRAMUSCULAR; INTRAVENOUS at 08:40

## 2025-06-28 RX ADMIN — LEVOTHYROXINE SODIUM 25 MCG: 0.03 TABLET ORAL at 05:48

## 2025-06-28 RX ADMIN — GUAIFENESIN AND DEXTROMETHORPHAN 10 ML: 100; 10 SYRUP ORAL at 13:18

## 2025-06-28 RX ADMIN — BENZONATATE 100 MG: 100 CAPSULE ORAL at 20:50

## 2025-06-28 RX ADMIN — DOXYCYCLINE HYCLATE 100 MG: 100 CAPSULE ORAL at 20:50

## 2025-06-28 RX ADMIN — Medication 10 ML: at 08:40

## 2025-06-28 RX ADMIN — HYDROCODONE BITARTRATE AND ACETAMINOPHEN 1 TABLET: 5; 325 TABLET ORAL at 23:50

## 2025-06-28 RX ADMIN — OXYCODONE 5 MG: 5 TABLET ORAL at 18:31

## 2025-06-28 RX ADMIN — PANTOPRAZOLE SODIUM 40 MG: 40 TABLET, DELAYED RELEASE ORAL at 05:48

## 2025-06-28 RX ADMIN — ACETAMINOPHEN 650 MG: 325 TABLET ORAL at 23:49

## 2025-06-28 RX ADMIN — COLCHICINE 1.2 MG: 0.6 TABLET, FILM COATED ORAL at 13:17

## 2025-06-28 RX ADMIN — OXYCODONE 5 MG: 5 TABLET ORAL at 05:47

## 2025-06-28 RX ADMIN — MORPHINE SULFATE 2 MG: 2 INJECTION, SOLUTION INTRAMUSCULAR; INTRAVENOUS at 20:49

## 2025-06-28 RX ADMIN — MELOXICAM 7.5 MG: 7.5 TABLET ORAL at 13:18

## 2025-06-28 RX ADMIN — METOPROLOL SUCCINATE 25 MG: 25 TABLET, EXTENDED RELEASE ORAL at 08:40

## 2025-06-28 RX ADMIN — ASPIRIN 325 MG: 325 TABLET, COATED ORAL at 08:39

## 2025-06-28 RX ADMIN — OXYCODONE 5 MG: 5 TABLET ORAL at 13:17

## 2025-06-28 RX ADMIN — Medication 10 ML: at 20:50

## 2025-06-28 RX ADMIN — CEPHALEXIN 500 MG: 250 CAPSULE ORAL at 16:56

## 2025-06-28 RX ADMIN — ACETAMINOPHEN 650 MG: 325 TABLET ORAL at 13:16

## 2025-06-28 RX ADMIN — HYDROCODONE BITARTRATE AND ACETAMINOPHEN 1 TABLET: 5; 325 TABLET ORAL at 16:56

## 2025-06-28 RX ADMIN — ACETAMINOPHEN 650 MG: 325 TABLET ORAL at 18:30

## 2025-06-28 RX ADMIN — ACETAMINOPHEN 650 MG: 325 TABLET ORAL at 05:47

## 2025-06-28 RX ADMIN — HYDROCODONE BITARTRATE AND ACETAMINOPHEN 1 TABLET: 5; 325 TABLET ORAL at 08:39

## 2025-06-28 RX ADMIN — BENZONATATE 100 MG: 100 CAPSULE ORAL at 13:17

## 2025-06-28 ASSESSMENT — PAIN DESCRIPTION - LOCATION
LOCATION: KNEE;LEG
LOCATION: HIP;KNEE;LEG
LOCATION: KNEE;LEG

## 2025-06-28 ASSESSMENT — PAIN DESCRIPTION - ORIENTATION
ORIENTATION: LEFT
ORIENTATION: LOWER;LEFT
ORIENTATION: LEFT

## 2025-06-28 ASSESSMENT — PAIN SCALES - GENERAL
PAINLEVEL_OUTOF10: 7
PAINLEVEL_OUTOF10: 6
PAINLEVEL_OUTOF10: 7
PAINLEVEL_OUTOF10: 7
PAINLEVEL_OUTOF10: 6
PAINLEVEL_OUTOF10: 7
PAINLEVEL_OUTOF10: 5
PAINLEVEL_OUTOF10: 7
PAINLEVEL_OUTOF10: 7
PAINLEVEL_OUTOF10: 6
PAINLEVEL_OUTOF10: 7

## 2025-06-28 ASSESSMENT — PAIN DESCRIPTION - DESCRIPTORS
DESCRIPTORS: ACHING
DESCRIPTORS: STABBING;ACHING;DISCOMFORT
DESCRIPTORS: ACHING

## 2025-06-28 ASSESSMENT — PAIN - FUNCTIONAL ASSESSMENT
PAIN_FUNCTIONAL_ASSESSMENT: ACTIVITIES ARE NOT PREVENTED
PAIN_FUNCTIONAL_ASSESSMENT: PREVENTS OR INTERFERES SOME ACTIVE ACTIVITIES AND ADLS

## 2025-06-28 NOTE — PLAN OF CARE
Problem: Discharge Planning  Goal: Discharge to home or other facility with appropriate resources  6/27/2025 2234 by Sharda Mckeon RN  Outcome: Progressing     Problem: Safety - Adult  Goal: Free from fall injury  6/27/2025 2234 by Sharda Mckeon RN  Outcome: Progressing     Problem: Pain  Goal: Verbalizes/displays adequate comfort level or baseline comfort level  6/27/2025 2234 by Sharda Mckeon RN  Outcome: Progressing

## 2025-06-28 NOTE — PLAN OF CARE
Problem: Discharge Planning  Goal: Discharge to home or other facility with appropriate resources  6/28/2025 1037 by Nini Harkins RN  Outcome: Progressing  Flowsheets (Taken 6/28/2025 0833)  Discharge to home or other facility with appropriate resources: Identify barriers to discharge with patient and caregiver  6/27/2025 2234 by Sharda Mckeon RN  Outcome: Progressing     Problem: Safety - Adult  Goal: Free from fall injury  6/28/2025 1037 by Nini Harkins RN  Outcome: Progressing  Flowsheets (Taken 6/28/2025 0830)  Free From Fall Injury: Instruct family/caregiver on patient safety  6/27/2025 2234 by Sharda Mckeon RN  Outcome: Progressing     Problem: Pain  Goal: Verbalizes/displays adequate comfort level or baseline comfort level  6/28/2025 1037 by Nini Harkins RN  Outcome: Progressing  Flowsheets (Taken 6/28/2025 0833)  Verbalizes/displays adequate comfort level or baseline comfort level: Encourage patient to monitor pain and request assistance  6/27/2025 2234 by Sharda Mckeon RN  Outcome: Progressing

## 2025-06-28 NOTE — CARE COORDINATION
SOCIAL WORK / DISCHARGE PLANNING:  Sw discussed case with Rashid Kincaid RN, pt will be changed to po atb for discharge. Dc plan for [facility] with [facility] is has changed. If should change again, notes indicate that [facility] service agreement has not been completed and HHC order is needed prior to dc.               Electronically signed by BRIAN Madrigal on 6/28/2025 at 4:59 PM

## 2025-06-29 VITALS
DIASTOLIC BLOOD PRESSURE: 76 MMHG | SYSTOLIC BLOOD PRESSURE: 130 MMHG | HEART RATE: 63 BPM | OXYGEN SATURATION: 97 % | TEMPERATURE: 97.5 F | RESPIRATION RATE: 16 BRPM

## 2025-06-29 LAB
CULTURE: NORMAL
DIRECT EXAM: NORMAL
MICROORGANISM SPEC CULT: NORMAL
SERVICE CMNT-IMP: NORMAL
SPECIMEN DESCRIPTION: NORMAL
SPECIMEN DESCRIPTION: NORMAL

## 2025-06-29 PROCEDURE — 6370000000 HC RX 637 (ALT 250 FOR IP): Performed by: INTERNAL MEDICINE

## 2025-06-29 PROCEDURE — 6370000000 HC RX 637 (ALT 250 FOR IP)

## 2025-06-29 PROCEDURE — 2500000003 HC RX 250 WO HCPCS: Performed by: INTERNAL MEDICINE

## 2025-06-29 PROCEDURE — 6370000000 HC RX 637 (ALT 250 FOR IP): Performed by: SPECIALIST

## 2025-06-29 RX ORDER — OXYCODONE AND ACETAMINOPHEN 5; 325 MG/1; MG/1
1 TABLET ORAL EVERY 6 HOURS PRN
Qty: 20 TABLET | Refills: 0 | Status: SHIPPED | OUTPATIENT
Start: 2025-06-29 | End: 2025-07-04

## 2025-06-29 RX ORDER — DOXYCYCLINE 100 MG/1
100 CAPSULE ORAL EVERY 12 HOURS SCHEDULED
Qty: 60 CAPSULE | Refills: 0 | Status: SHIPPED | OUTPATIENT
Start: 2025-06-29 | End: 2025-07-29

## 2025-06-29 RX ORDER — CEPHALEXIN 500 MG/1
500 CAPSULE ORAL 4 TIMES DAILY
Qty: 112 CAPSULE | Refills: 0 | Status: SHIPPED | OUTPATIENT
Start: 2025-06-29 | End: 2025-07-27

## 2025-06-29 RX ADMIN — ACETAMINOPHEN 650 MG: 325 TABLET ORAL at 11:55

## 2025-06-29 RX ADMIN — MELOXICAM 7.5 MG: 7.5 TABLET ORAL at 08:22

## 2025-06-29 RX ADMIN — ASPIRIN 325 MG: 325 TABLET, COATED ORAL at 08:22

## 2025-06-29 RX ADMIN — OXYCODONE 5 MG: 5 TABLET ORAL at 12:42

## 2025-06-29 RX ADMIN — OXYCODONE 5 MG: 5 TABLET ORAL at 06:31

## 2025-06-29 RX ADMIN — CEPHALEXIN 500 MG: 250 CAPSULE ORAL at 06:31

## 2025-06-29 RX ADMIN — DOXYCYCLINE HYCLATE 100 MG: 100 CAPSULE ORAL at 08:22

## 2025-06-29 RX ADMIN — Medication 10 ML: at 08:23

## 2025-06-29 RX ADMIN — METOPROLOL SUCCINATE 25 MG: 25 TABLET, EXTENDED RELEASE ORAL at 08:22

## 2025-06-29 RX ADMIN — CEPHALEXIN 500 MG: 250 CAPSULE ORAL at 11:55

## 2025-06-29 RX ADMIN — PANTOPRAZOLE SODIUM 40 MG: 40 TABLET, DELAYED RELEASE ORAL at 06:31

## 2025-06-29 RX ADMIN — COLCHICINE 0.6 MG: 0.6 TABLET, FILM COATED ORAL at 08:22

## 2025-06-29 RX ADMIN — LEVOTHYROXINE SODIUM 25 MCG: 0.03 TABLET ORAL at 06:31

## 2025-06-29 RX ADMIN — BENZONATATE 100 MG: 100 CAPSULE ORAL at 08:22

## 2025-06-29 RX ADMIN — ACETAMINOPHEN 650 MG: 325 TABLET ORAL at 06:31

## 2025-06-29 ASSESSMENT — PAIN DESCRIPTION - DESCRIPTORS
DESCRIPTORS: ACHING;TIGHTNESS
DESCRIPTORS: ACHING

## 2025-06-29 ASSESSMENT — PAIN SCALES - GENERAL
PAINLEVEL_OUTOF10: 6
PAINLEVEL_OUTOF10: 3
PAINLEVEL_OUTOF10: 7
PAINLEVEL_OUTOF10: 7

## 2025-06-29 ASSESSMENT — PAIN DESCRIPTION - LOCATION
LOCATION: KNEE;LEG
LOCATION: KNEE

## 2025-06-29 ASSESSMENT — PAIN DESCRIPTION - ORIENTATION
ORIENTATION: LEFT
ORIENTATION: LEFT

## 2025-06-29 NOTE — PLAN OF CARE
Problem: Discharge Planning  Goal: Discharge to home or other facility with appropriate resources  6/28/2025 2131 by Darlene Burciaga RN  Outcome: Progressing  6/28/2025 1037 by Nini Harkins RN  Outcome: Progressing  Flowsheets (Taken 6/28/2025 0833)  Discharge to home or other facility with appropriate resources: Identify barriers to discharge with patient and caregiver     Problem: Safety - Adult  Goal: Free from fall injury  6/28/2025 2131 by Darlene Burciaga RN  Outcome: Progressing  6/28/2025 1037 by Nini Harkins RN  Outcome: Progressing  Flowsheets (Taken 6/28/2025 0830)  Free From Fall Injury: Instruct family/caregiver on patient safety     Problem: Pain  Goal: Verbalizes/displays adequate comfort level or baseline comfort level  6/28/2025 2131 by Darlene Burciaga RN  Outcome: Progressing  6/28/2025 1037 by Nini Harkins RN  Outcome: Progressing  Flowsheets (Taken 6/28/2025 0833)  Verbalizes/displays adequate comfort level or baseline comfort level: Encourage patient to monitor pain and request assistance

## 2025-06-30 ENCOUNTER — OFFICE VISIT (OUTPATIENT)
Dept: ORTHOPEDIC SURGERY | Age: 70
End: 2025-06-30

## 2025-06-30 DIAGNOSIS — M00.9 PYOGENIC ARTHRITIS OF LEFT KNEE JOINT, DUE TO UNSPECIFIED ORGANISM (HCC): Primary | ICD-10-CM

## 2025-06-30 LAB
EKG ATRIAL RATE: 68 BPM
EKG P AXIS: 47 DEGREES
EKG P-R INTERVAL: 148 MS
EKG Q-T INTERVAL: 398 MS
EKG QRS DURATION: 102 MS
EKG QTC CALCULATION (BAZETT): 423 MS
EKG R AXIS: 53 DEGREES
EKG T AXIS: 57 DEGREES
EKG VENTRICULAR RATE: 68 BPM

## 2025-06-30 PROCEDURE — 93010 ELECTROCARDIOGRAM REPORT: CPT | Performed by: INTERNAL MEDICINE

## 2025-06-30 PROCEDURE — 99024 POSTOP FOLLOW-UP VISIT: CPT | Performed by: ORTHOPAEDIC SURGERY

## 2025-06-30 NOTE — PROGRESS NOTES
Physician Progress Note      PATIENT:               RAMOS AARON  CSN #:                  [id number]  :                       1955  ADMIT DATE:       2025 10:35 AM  DISCH DATE:        2025 1:39 PM  RESPONDING  PROVIDER #:        BRIANDA PARRA          QUERY TEXT:    Noted documentation of Sepsis on  by Infectious disease consultant.  Based   on your medical judgment, please clarify these findings and document if any   of the following are being evaluated and/or treated:    The clinical indicators include:  -H&P  Pyogenic arthritis of knee  -ID Consult  Admitted for Pyogenic arthritis of left knee joint, due to   unspecified organism  -Sepsis---Fever leukocytosis tachycardia--- Pyogenic   arthritis of left knee joint, due to unspecified organism  -VS findings  100.8 102 14 122/87  98.3 109 12 167/86  -Lab findings  WBC 13.1  -Incision and drainage  -IV ancef  Options provided:  -- sepsis confirmed present on admission  -- Defer to ID consultant documentation regarding sepsis  -- Other - I will add my own diagnosis  -- Disagree - Not applicable / Not valid  -- Disagree - Clinically unable to determine / Unknown  -- Refer to Clinical Documentation Reviewer    PROVIDER RESPONSE TEXT:    I defer to ID consultant regarding documentation of sepsis    Query created by: Viviana Villalobos on 2025 12:49 PM      Electronically signed by:  BRIANDA PARRA 2025 10:56 PM          
2G Ancef 0730 dose given in surgery.    
4 Eyes Skin Assessment     NAME:  Jason Dolan  YOB: 1955  MEDICAL RECORD NUMBER:  [id number]    The patient is being assessed for  Admission    I agree that at least one RN has performed a thorough Head to Toe Skin Assessment on the patient. ALL assessment sites listed below have been assessed.      Areas assessed by both nurses:    Head, Face, Ears, Shoulders, Back, Chest, Arms, Elbows, Hands, Sacrum. Buttock, Coccyx, Ischium, and Legs. Feet and Heels LLE medial shin area surgical wound.          Does the Patient have a Wound? Yes wound(s) were present on assessment. LDA wound assessment was Initiated and completed by RN       Anthony Prevention initiated by RN: No  Wound Care Orders initiated by RN: No    Pressure Injury (Stage 3,4, Unstageable, DTI, NWPT, and Complex wounds) if present, place Wound referral order by RN under : No    New Ostomies, if present place, Ostomy referral order under : No     Nurse 1 eSignature: Electronically signed by Sherman Metcalf RN on 6/25/25 at 2:57 PM EDT    **SHARE this note so that the co-signing nurse can place an eSignature**    Nurse 2 eSignature: Electronically signed by Samira Bray RN on 6/25/25 at 3:00 PM EDT   
Communion   
Department of Internal Medicine        CHIEF COMPLAINT:      Reason for Admission:      HISTORY OF PRESENT ILLNESS:      The patient is a 69 y.o. male who presents with having outpatient evaluation with orthopedics with the patient having a knee aspiration and was positive for WBCs and the patient was admitted in the hospital to have arthroscopic incision and drainage left knee fluid.  This was performed earlier this morning.  BUN/creatinine was 14/1.0 with CO2 electrolytes 18.  WBC 13.1 with open of 14.1..  Temperature 98 with heart rate 100 blood pressure 142/83.  O2 sat 97% room air at rest.  Patient has expected postop discomfort.  Patient denies any prior chest pain, abdominal pain, nausea vomiting or unusual shortness of breath.    6/27  Patient seen examined on PCU.  Patient has expected postop discomfort.  Patient denies any fever/chills, nausea/vomiting or unusual shortness of breath.  Temperature 98.3 with heart rate of 68 blood pressure 138/80.  O2 sat 100% on room air at rest.  Pending ID evaluation.  Patient medically stable for discharge when okay with ID/orthopedics.    6/28  Patient seen examined on PCU.  Patient complains of some discomfort in his left knee still.  Patient has a mild increase in his a.m. cough.  Scantly productive off-and-on.  Patient denies any fever/chills, chest or abdominal pain.  He is not unusually short of breath.  BUN/creatinine 16/0.9 with normal electrolytes or liver enzymes.  WBCs 5.9 hemoglobin 4.2.  Temperature is 98.6 with heart rate of 70 and blood pressure 149/96.  O2 sat 99% on room air at rest.  Nursing to notify orthopedics regarding patient's continued discomfort in his left knee.  Show 's note reviewed and patient is set up for discharge home on Monday with home IV antibiotics.  Add Tessalon Perles 100 mg 3 times daily and Robitussin DM 10 cc every 4 hours as needed.    6/29  Patient seen examined on PCU.  Patient has expected postop discomfort.  
Department of Internal Medicine        CHIEF COMPLAINT:      Reason for Admission:      HISTORY OF PRESENT ILLNESS:      The patient is a 69 y.o. male who presents with having outpatient evaluation with orthopedics with the patient having a knee aspiration and was positive for WBCs and the patient was admitted in the hospital to have arthroscopic incision and drainage left knee fluid.  This was performed earlier this morning.  BUN/creatinine was 14/1.0 with CO2 electrolytes 18.  WBC 13.1 with open of 14.1..  Temperature 98 with heart rate 100 blood pressure 142/83.  O2 sat 97% room air at rest.  Patient has expected postop discomfort.  Patient denies any prior chest pain, abdominal pain, nausea vomiting or unusual shortness of breath.    6/27  Patient seen examined on PCU.  Patient has expected postop discomfort.  Patient denies any fever/chills, nausea/vomiting or unusual shortness of breath.  Temperature 98.3 with heart rate of 68 blood pressure 138/80.  O2 sat 100% on room air at rest.  Pending ID evaluation.  Patient medically stable for discharge when okay with ID/orthopedics.    6/28  Patient seen examined on PCU.  Patient complains of some discomfort in his left knee still.  Patient has a mild increase in his a.m. cough.  Scantly productive off-and-on.  Patient denies any fever/chills, chest or abdominal pain.  He is not unusually short of breath.  BUN/creatinine 16/0.9 with normal electrolytes or liver enzymes.  WBCs 5.9 hemoglobin 4.2.  Temperature is 98.6 with heart rate of 70 and blood pressure 149/96.  O2 sat 99% on room air at rest.  Nursing to notify orthopedics regarding patient's continued discomfort in his left knee.  Show 's note reviewed and patient is set up for discharge home on Monday with home IV antibiotics.  Add Tessalon Perles 100 mg 3 times daily and Robitussin DM 10 cc every 4 hours as needed.    Past Medical History:    Past Medical History:   Diagnosis Date    Benign 
Department of Internal Medicine        CHIEF COMPLAINT:      Reason for Admission:      HISTORY OF PRESENT ILLNESS:      The patient is a 69 y.o. male who presents with having outpatient evaluation with orthopedics with the patient having a knee aspiration and was positive for WBCs and the patient was admitted in the hospital to have arthroscopic incision and drainage left knee fluid.  This was performed earlier this morning.  BUN/creatinine was 14/1.0 with CO2 electrolytes 18.  WBC 13.1 with open of 14.1..  Temperature 98 with heart rate 100 blood pressure 142/83.  O2 sat 97% room air at rest.  Patient has expected postop discomfort.  Patient denies any prior chest pain, abdominal pain, nausea vomiting or unusual shortness of breath.    6/27  Patient seen examined on PCU.  Patient has expected postop discomfort.  Patient denies any fever/chills, nausea/vomiting or unusual shortness of breath.  Temperature 98.3 with heart rate of 68 blood pressure 138/80.  O2 sat 100% on room air at rest.  Pending ID evaluation.  Patient medically stable for discharge when okay with ID/orthopedics.    Past Medical History:    Past Medical History:   Diagnosis Date    Benign gastrointestinal stromal tumor (GIST)     Cancer (HCC)     skin  squamos    Hyperlipidemia     Thyroid disease      Past Surgical History:    Past Surgical History:   Procedure Laterality Date    COLECTOMY N/A 5/18/2021    ROBOTIC  LAPAROSCOPIC ASSISTED VENTRAL HERNIA REPAIR WITH MESH AND LYSIS OF ADHESIONS performed by Lit Haynes MD at [facility] [place]    COLONOSCOPY      ENDOSCOPY, COLON, DIAGNOSTIC      HEEL SPUR SURGERY Left 7/1/2021    RESECTION RETROCALCANEAL EXOSTOSIS LEFT FOOT, REPAIR ACHILLES TENDON LEFT (CPT 01455) (ARTHREX) performed by Bonilla Vargas Jr., DPM at [facility] OR    HERNIA REPAIR  1998    KNEE ARTHROSCOPY Left 6/26/2025    ARTHROSCOPIC INCISION AND DRAINAGE LEFT KNEE performed by Ab Benites DO at [facility] OR    KNEE CARTILAGE SURGERY 
Department of Orthopedic Surgery  Resident Progress Note      SUBJECTIVE  Patient seen and examined sitting on the edge of the bed comfortably.  Patient reports pain is significantly improved.  He has just finished ambulating down the hallway.  Denies numbness tingling paresthesia.  Patient is eager for discharge home today.  All question concerns were addressed during this encounter.    OBJECTIVE    Physical    VITALS:  /76   Pulse 63   Temp 97.5 °F (36.4 °C) (Oral)   Resp 17   SpO2 97%     MUSCULOSKELETAL:   left lower extremity:  Dressing C/D/I  Compartments soft and compressible, calf non-tender  Palpable dorsalis pedis and posterior tibialis pulse, brisk cap refill to toes, foot warm and perfused  Sensation intact to light touch in sural/deep peroneal/superficial peroneal/saphenous/posterior tibial nerve distributions to foot/ankle.  Demonstrates active ankle plantar/dorsiflexion/great toe extension    Data    CBC:   Lab Results   Component Value Date/Time    WBC 5.9 06/28/2025 08:14 AM    WBC 47,370 06/24/2025 08:35 AM    RBC 4.43 06/28/2025 08:14 AM    HGB 12.2 06/28/2025 08:14 AM    HCT 38.5 06/28/2025 08:14 AM    MCV 86.9 06/28/2025 08:14 AM    MCH 27.5 06/28/2025 08:14 AM    MCHC 31.7 06/28/2025 08:14 AM    RDW 14.6 06/28/2025 08:14 AM     06/28/2025 08:14 AM    MPV 9.9 06/28/2025 08:14 AM     PT/INR:  No results found for: \"PROTIME\", \"INR\"    Labs  No results for input(s): \"BC\", \"BLOODCULT2\" in the last 72 hours.  No results for input(s): \"CXSURG\" in the last 72 hours.    Intraop Cultures: shows no growth    ASSESSMENT  POD 3 s/p left knee arthroscopic irrigation and debridement    PLAN    Weightbearing as tolerated left lower extremity  Deep venous thrombosis prophylaxis -   mg BID. early mobilization, and pneumatic compression device  Pain Control: P.o.  Med rec completed  D/C Plan: Home today.  Patient has a previous scheduled appointment with Dr. Benites tomorrow in office.  He 
Department of Orthopedic Surgery  Resident Progress Note    Patient seen and examined walking with physical therapy down the hallway complaining of mild pain to his left lower extremity this time.  Denies any fevers chills nausea vomiting.  Denies any increasing numbness tingling paresthesias tramadol left lower extremity left foot.    VITALS:  /80   Pulse 65   Temp 98 °F (36.7 °C) (Axillary)   Resp 18   SpO2 100%     General: Awake alert oriented x 3    MUSCULOSKELETAL:   left lower extremity:  Dressing C/D/I  Compartments soft and compressible  +PF/DF/EHL  +2/4 DP & PT pulses, Brisk Cap refill, Toes warm and perfused  Distal sensation grossly intact to Peroneals, Sural, Saphenous, and tibial nrs    CBC:   Lab Results   Component Value Date/Time    WBC 13.1 06/26/2025 10:05 AM    WBC 47,370 06/24/2025 08:35 AM    HGB 14.1 06/26/2025 10:05 AM    HCT 43.9 06/26/2025 10:05 AM     06/26/2025 10:05 AM     PT/INR:  No results found for: \"PROTIME\", \"INR\"    ASSESSMENT  S/P left knee I&D on 6/26/2025    PLAN    Plan discussed patient all patient's questions answered to his satisfaction  Cultures pending  ID recommendations appreciated  PT/OT as able  Medical management appreciated  Weightbearing as tolerated left lower extremity  Okay to start DVT prophylaxis today  Plan: Cleared for discharge from orthopedic standpoint awaiting final ID recommendations.  Once these recommendations are finalized please page us the discharge order will be placed  D/W attending  Electronically signed by Fili Gallo DO on 6/27/2025 at 12:09 PM   
OCCUPATIONAL THERAPY INITIAL EVALUATION    [facility]  [address] [place]        Date:2025                                                  Patient Name: Jason Dolan    MRN: [id number]    : 1955    Room: [address] [place] [address]      Evaluating OT: Raphael Ya OTR/L; #[id number]     Referring Provider and Specific Provider Orders/Date:      25 1100  OT eval and treat  Start:  25 1100,   End:  25 1100,   ONE TIME,   Standing Count:  1 Occurrences,   R         Arnol Marrero DO      Placement Recommendation: Home       Diagnosis:   1. Pyogenic arthritis of knee, due to unspecified organism, unspecified laterality (HCC)    2. Pyogenic arthritis of left knee joint, due to unspecified organism (HCC)         Surgery:   Procedure Summary         Date: 25 Room / Location: [address]     Anesthesia Start: 658 Anesthesia Stop: 808     Procedure: ARTHROSCOPIC INCISION AND DRAINAGE LEFT KNEE (Left: Knee) Diagnosis:       Pyogenic arthritis of left knee joint, due to unspecified organism (HCC)      (Pyogenic arthritis of left knee joint, due to unspecified organism (HCC) [M00.9])     Surgeons: Ab Benites DO Responsible Provider: Shadi Bryant DO     Anesthesia Type: general ASA Status: 3          Pertinent Medical History:       Past Medical History:   Diagnosis Date    Benign gastrointestinal stromal tumor (GIST)     Cancer (HCC)     skin  squamos    Hyperlipidemia     Thyroid disease          Past Surgical History:   Procedure Laterality Date    COLECTOMY N/A 2021    ROBOTIC  LAPAROSCOPIC ASSISTED VENTRAL HERNIA REPAIR WITH MESH AND LYSIS OF ADHESIONS performed [place] Lit Haynes [place] [facility] [place]    COLONOSCOPY      ENDOSCOPY, COLON, DIAGNOSTIC      HEEL SPUR SURGERY Left 2021    RESECTION RETROCALCANEAL EXOSTOSIS LEFT FOOT, REPAIR ACHILLES TENDON LEFT (CPT 67706) (ARTHREX) 
[facility]  [facility]  Progress Note  CC: septic arthritis   Face to face encounter   SUBJECTIVE:  6/27/2025  Patient is resting in bed- left knee feels better.   Has been afebrile.   Patient is tolerating medications. No reported adverse drug reactions.  No nausea, vomiting, diarrhea.    Medications:  Scheduled Meds:   aspirin  325 mg Oral Daily    levothyroxine  25 mcg Oral Daily    metoprolol succinate  25 mg Oral Daily    pantoprazole  40 mg Oral QAM AC    sodium chloride flush  5-40 mL IntraVENous 2 times per day    acetaminophen  650 mg Oral 4 times per day    Or    acetaminophen  650 mg Rectal 4 times per day    ceFAZolin  2,000 mg IntraVENous q8h     Continuous Infusions:   sodium chloride       PRN Meds:HYDROcodone-acetaminophen, sodium chloride flush, sodium chloride, potassium chloride **OR** potassium alternative oral replacement **OR** potassium chloride, magnesium sulfate, ondansetron **OR** ondansetron, polyethylene glycol, oxyCODONE, morphine  OBJECTIVE:  Patient Vitals for the past 24 hrs:   BP Temp Temp src Pulse Resp SpO2   06/27/25 0853 -- -- -- 65 18 --   06/27/25 0729 138/80 98 °F (36.7 °C) Axillary 68 18 100 %   06/27/25 0649 -- -- -- -- 18 --   06/27/25 0619 -- -- -- -- 18 --   06/27/25 0451 -- -- -- -- 18 --   06/27/25 0420 131/79 97.9 °F (36.6 °C) Oral 67 18 97 %   06/27/25 0001 -- -- -- -- 18 --   06/26/25 2335 (!) 149/79 98.1 °F (36.7 °C) Oral 66 -- 95 %   06/26/25 2331 -- -- -- -- 18 --   06/26/25 2050 -- -- -- -- 18 --   06/26/25 2020 -- -- -- -- 18 --   06/26/25 1926 119/68 98.2 °F (36.8 °C) Oral 73 -- 94 %     Constitutional: The patient is awake, alert, and oriented. Sitting up in bed.   Skin: Warm and dry. No rashes were noted.   Head: Eyes show round, and reactive pupils. No jaundice.   Mouth: Moist mucous membranes, no ulcerations, no thrush.   Neck: Supple to movements. No lymphadenopathy.   Chest: No use of accessory muscles to breathe. Symmetrical 
Patient off the floor for surgery.  
Physical Therapy  Physical Therapy Initial Evaluation/Plan of Care    Room #:  0539/0539-01  Patient Name: Jason Dolan  YOB: 1955  MRN: [id number]    Date of Service: 6/26/2025     Tentative placement recommendation: Home with Home Health Physical Therapy vs Home with no PT  Equipment recommendation: Patient has needed equipment       Evaluating Physical Therapist: Justen Rao, PT, DPT #[id number]      Specific Provider Orders/Date/Referring Provider :     06/26/25 1100    PT evaluation and treat  Start:  06/26/25 1100,   End:  06/26/25 1100,   ONE TIME,   Standing Count:  1 Occurrences,   R       Elida, Arnol A, DO Acknowledge New    Admitting Diagnosis:   Septic joint (HCC) [M00.9]  Pyogenic arthritis of knee, due to unspecified organism, unspecified laterality (HCC) [M00.9]      Surgery: I and D of L knee on 6/26/25      Patient Active Problem List   Diagnosis    Retrocalcaneal exostosis    Incarcerated ventral hernia    Achilles tendonosis of left lower extremity    Septic joint (HCC)        ASSESSMENT of Current Deficits Patient exhibits decreased strength, balance, and endurance impairing functional mobility, transfers, gait , gait distance, and tolerance to activity. Pt mildly unsteady with gait with cane with no LOB, dizziness, or SOB during session with fair+ tolerance for function. Pt agreeable to seated exercises sitting EOB following function with VC for technique.        PHYSICAL THERAPY  PLAN OF CARE       Physical therapy plan of care is established based on physician order,  patient diagnosis and clinical assessment    Current Treatment Recommendations:    -Bed Mobility: Lower and upper extremity exercises, and trunk control activities  -Sitting Balance: Incorporate reaching activities to activate trunk muscles , Hands on support to maintain midline , Facilitate active trunk muscle engagement , Facilitate postural control in all planes , and Engage in core activities to allow for 
[facility]  [facility]  Progress Note  CC: septic arthritis   Face to face encounter   SUBJECTIVE:  6/28/2025  In bed has some left knee pain dressed no f/c/n/v/d  6/27   Patient is resting in bed- left knee feels better.   Has been afebrile.   Patient is tolerating medications. No reported adverse drug reactions.  No nausea, vomiting, diarrhea.    Medications:  Scheduled Meds:   benzonatate  100 mg Oral TID    aspirin  325 mg Oral Daily    levothyroxine  25 mcg Oral Daily    metoprolol succinate  25 mg Oral Daily    pantoprazole  40 mg Oral QAM AC    sodium chloride flush  5-40 mL IntraVENous 2 times per day    acetaminophen  650 mg Oral 4 times per day    Or    acetaminophen  650 mg Rectal 4 times per day    ceFAZolin  2,000 mg IntraVENous q8h     Continuous Infusions:   sodium chloride       PRN Meds:guaiFENesin-dextromethorphan, HYDROcodone-acetaminophen, sodium chloride flush, sodium chloride, potassium chloride **OR** potassium alternative oral replacement **OR** potassium chloride, magnesium sulfate, ondansetron **OR** ondansetron, polyethylene glycol, oxyCODONE, morphine  OBJECTIVE:  Patient Vitals for the past 24 hrs:   BP Temp Temp src Pulse Resp SpO2   06/28/25 0909 -- -- -- -- 16 --   06/28/25 0833 (!) 149/96 98.6 °F (37 °C) Oral 70 18 99 %   06/28/25 0530 132/78 97.9 °F (36.6 °C) Oral 65 16 99 %   06/27/25 2145 (!) 146/79 97.3 °F (36.3 °C) Oral 64 18 97 %   06/27/25 1837 -- -- -- -- 18 --   06/27/25 1555 -- -- -- -- 16 --   06/27/25 1525 -- -- -- -- 18 --   06/27/25 1251 -- -- -- -- 16 --     Constitutional: The patient is awake, alert,    Skin:  No rashes were noted.   Head: at/nc   Mouth: Moist mucous membranes, no ulcerations, no thrush.   Neck: Supple to movements. No lymphadenopathy.   Chest: No use of accessory muscles to breathe. Symmetrical expansion. Auscultation reveals no wheezing, crackles, or rhonchi.   Cardiovascular: S1 and S2 are rhythmic and regular. 
Spiritual Health History and Assessment/Progress Note  [facility]    Spiritual/Emotional Needs,  ,  ,      Name: Jason Dolan MRN: [id number]    Age: 69 y.o.     Sex: male   Language: English   Yazdanism: Yarsani   Septic joint (HCC)     Date: 6/28/2025                           Spiritual Assessment began in [facility] PIC/ICU        Referral/Consult From: Rounding   Encounter Overview/Reason: Spiritual/Emotional Needs  Service Provided For: Patient    Yas, Belief, Meaning:   Patient identifies as spiritual and has beliefs or practices that help with coping during difficult times  Family/Friends No family/friends present      Importance and Influence:  Patient has spiritual/personal beliefs that influence decisions regarding their health  Family/Friends No family/friends present    Community:  Patient feels well-supported. Support system includes: Spouse/Partner  Family/Friends No family/friends present    Assessment and Plan of Care:     Patient Interventions include: Facilitated expression of thoughts and feelings, Explored spiritual coping/struggle/distress, and Affirmed coping skills/support systems  Family/Friends Interventions include: No family/friends present    Patient Plan of Care: Spiritual Care available upon further referral  Family/Friends Plan of Care: No family/friends present    Electronically signed by TONY Casiano on 6/28/2025 at 10:54 AM   
replaced.  At this time no acute orthopedic intervention required.  A thorough discussion regarding the patient left knee was had in terms of inflammatory arthropathy versus septic knee.  Clinically patient presented as septic with laboratory findings of 48,000 and white cell count with a neutrophil percentage >90.  Synovial fluid obtained in the surgical theater was atypical for a septic knee.  Patient was informed that due to the laboratory numbers he could have presented with septic knee with superimposed inflammatory arthropathy and because of that formal irrigation debridement was indicated.  At this time we can proceed with medical management to address the possible inflammatory nature.  Will recommend colchicine for the acute flare up and will continue for approximately 7 days.  Patient states that in the event he had a sudden flareup he would like to be referred to a rheumatologist.  Patient can to continue follow-up with Dr. Benites in office in 2 weeks for suture removal.  Bandage can be removed and/or replace with simple bandage on post op day 7.  Deep venous thrombosis prophylaxis -   mg BID. early mobilization, and pneumatic compression device  PT/OT  Pain Control: IV and PO  Monitor H&H  D/C Plan: Pending ID recommendations. Plan for DC tomorrow after seen by ID

## 2025-06-30 NOTE — PROGRESS NOTES
Subjective:        Jason Dolan is here for follow-up after left knee arthroscopy I&D 6/26/25.  Pain is controlled with current analgesics.  Medication(s) being used: Norco.. He denies fever, wound drainage, increasing redness, pus, increasing pain, increasing swelling. Post op problems reported: none.  He is ambulating with cane. He is currently on Keflex and Doxycycline.        Objective:           General :    alert, appears stated age, and cooperative   Gait:  Antalgic.   Sutures:   Sutures in place.   Incision:  healing well, no significant drainage, no dehiscence, no significant erythema   Tenderness:  mild   Flexion ROM:  full range of motion   Extension ROM:  full range of motion   Effusion:  mild   DVT Evaluation:  No evidence of DVT seen on physical exam.           Assessment:     Encounter Diagnosis   Name Primary?    Pyogenic arthritis of left knee joint, due to unspecified organism (HCC) Yes         Plan:      Surgical pictures from the surgery were reveiwed with the patient  HEP  Follow up: 1 week.   Continue ABX  Recommend to follow up with I&D

## 2025-07-01 LAB
MICROORGANISM SPEC CULT: NORMAL
MICROORGANISM SPEC CULT: NORMAL
MICROORGANISM/AGENT SPEC: NORMAL
SERVICE CMNT-IMP: NORMAL
SERVICE CMNT-IMP: NORMAL
SPECIMEN DESCRIPTION: NORMAL
SPECIMEN DESCRIPTION: NORMAL

## 2025-07-03 NOTE — DISCHARGE SUMMARY
Physician Discharge Summary     Patient ID:  Jason Dolan  [id number]  69 y.o.  1955    Admit date: 6/25/2025    Discharge date and time: 6/29/2025  1:39 PM     Admitting Physician: Ab Benites DO     Discharge Physician: Ab Benites DO    Admission Diagnoses: Septic joint (HCC) [M00.9]  Pyogenic arthritis of knee, due to unspecified organism, unspecified laterality (HCC) [M00.9]    Discharge Diagnoses: s/p left arthroscopic incision and drainage    Admission Condition: stable    Discharged Condition: stable    Hospital Course: The patient was admitted from the Emergency room. After examination, review of radiologic studies, and appropriate pre-operative risk assessment, it was recommended by Ab Benites DO to undergo left knee incision and drainage.  The patient underwent an uneventful course of left knee arthroscopic incision and drainage by Ab Benites DO on 6/25/2025. The patient was subsequently taken to the PACU and the floor in stable condition. The patient was continued on antibiotics for 24 hours post-operatively as they received a dose of antibiotics pre-operatively as well. The patient was started on DVT prophylaxis and physical therapy with instructions to be WBAT.  was consulted for d/c planning. On POD 3, after the patient had made appropriate gains in regaining independent function with physical therapy, the patient was discharged to home in stable condition.    Treatments: s/p left knee arthroscopic incision and drainage    Disposition: Patient was discharge to home in stable condition. The patient was provided instructions to continue DVT prophylaxis as instructed, pain medication as prescribed, and to continue daily dry sterile dressing changes until wound is dry. The pt was allowed to shower on POD 4. The patient was to follow up with Ab Benites DO on 6/30/2025 for wound evaluation.  suture (s) were to be removed on within 2 weeks.       Medication

## 2025-07-05 LAB
MICROORGANISM SPEC CULT: NORMAL
MICROORGANISM/AGENT SPEC: NORMAL
SERVICE CMNT-IMP: NORMAL
SPECIMEN DESCRIPTION: NORMAL

## 2025-07-07 DIAGNOSIS — G89.18 ACUTE POST-OPERATIVE PAIN: ICD-10-CM

## 2025-07-07 RX ORDER — OXYCODONE AND ACETAMINOPHEN 5; 325 MG/1; MG/1
1 TABLET ORAL EVERY 6 HOURS PRN
Qty: 20 TABLET | Refills: 0 | Status: SHIPPED | OUTPATIENT
Start: 2025-07-07 | End: 2025-07-14 | Stop reason: SDUPTHER

## 2025-07-08 ENCOUNTER — OFFICE VISIT (OUTPATIENT)
Dept: ORTHOPEDIC SURGERY | Age: 70
End: 2025-07-08

## 2025-07-08 DIAGNOSIS — M00.9 PYOGENIC ARTHRITIS OF LEFT KNEE JOINT, DUE TO UNSPECIFIED ORGANISM (HCC): Primary | ICD-10-CM

## 2025-07-08 PROCEDURE — 99024 POSTOP FOLLOW-UP VISIT: CPT | Performed by: ORTHOPAEDIC SURGERY

## 2025-07-08 NOTE — PROGRESS NOTES
Subjective:        Jason Dolan is here for follow-up after left knee arthroscopy I&D 6/26/25.  Pain is controlled with current analgesics.  Medication(s) being used: Norco.. He denies fever, wound drainage, increasing redness, pus, increasing pain, increasing swelling. Post op problems reported: none. He is currently on Keflex and Doxycycline.  He is doing well and slowly improving.       Objective:           General :    alert, appears stated age, and cooperative   Gait:  Antalgic.   Sutures:   Sutures in place.   Incision:  healing well, no significant drainage, no dehiscence, no significant erythema   Tenderness:  mild   Flexion ROM:  full range of motion   Extension ROM:  full range of motion   Effusion:  mild   DVT Evaluation:  No evidence of DVT seen on physical exam.           Assessment:     Encounter Diagnosis   Name Primary?    Pyogenic arthritis of left knee joint, due to unspecified organism (HCC) Yes           Plan:     He will continue with slowly resuming activity.  We will stay the course.  He can return to the dermatologist.  He will perform his HEP.

## 2025-07-14 ENCOUNTER — TELEPHONE (OUTPATIENT)
Dept: ORTHOPEDIC SURGERY | Age: 70
End: 2025-07-14

## 2025-07-14 DIAGNOSIS — G89.18 ACUTE POST-OPERATIVE PAIN: ICD-10-CM

## 2025-07-14 RX ORDER — OXYCODONE AND ACETAMINOPHEN 5; 325 MG/1; MG/1
1 TABLET ORAL EVERY 6 HOURS PRN
Qty: 20 TABLET | Refills: 0 | Status: SHIPPED | OUTPATIENT
Start: 2025-07-14 | End: 2025-07-19

## 2025-07-21 DIAGNOSIS — G89.18 ACUTE POST-OPERATIVE PAIN: ICD-10-CM

## 2025-07-21 RX ORDER — OXYCODONE AND ACETAMINOPHEN 5; 325 MG/1; MG/1
1 TABLET ORAL EVERY 6 HOURS PRN
Qty: 20 TABLET | Refills: 0 | Status: SHIPPED | OUTPATIENT
Start: 2025-07-21 | End: 2025-07-26

## 2025-07-21 NOTE — TELEPHONE ENCOUNTER
.Last appointment 7/8/2025  Next appointment   Future Appointments   Date Time Provider Department Center   7/22/2025 11:00 AM Myla Luevano, APRN - CNP [facility]   8/11/2025  9:20 AM Ab Benites DO [facility]      Last refill 07/14/2025  DOS: 06/25/2025      Patient called in requesting refill of :    oxyCODONE-acetaminophen (PERCOCET) 5-325 MG per tablet     [facility] [address] [place] [address] [address] - P [phone] - F [phone]

## 2025-07-26 LAB
MICROORGANISM SPEC CULT: NORMAL
MICROORGANISM SPEC CULT: NORMAL
MICROORGANISM/AGENT SPEC: NORMAL
MICROORGANISM/AGENT SPEC: NORMAL
SERVICE CMNT-IMP: NORMAL
SERVICE CMNT-IMP: NORMAL
SPECIMEN DESCRIPTION: NORMAL
SPECIMEN DESCRIPTION: NORMAL

## 2025-07-28 DIAGNOSIS — G89.18 ACUTE POST-OPERATIVE PAIN: Primary | ICD-10-CM

## 2025-07-28 RX ORDER — OXYCODONE AND ACETAMINOPHEN 10; 325 MG/1; MG/1
1 TABLET ORAL EVERY 6 HOURS PRN
Qty: 28 TABLET | Refills: 0 | Status: SHIPPED | OUTPATIENT
Start: 2025-07-28 | End: 2025-08-04

## 2025-08-06 DIAGNOSIS — G89.18 ACUTE POST-OPERATIVE PAIN: ICD-10-CM

## 2025-08-06 RX ORDER — OXYCODONE AND ACETAMINOPHEN 10; 325 MG/1; MG/1
1 TABLET ORAL EVERY 8 HOURS PRN
Qty: 21 TABLET | Refills: 0 | Status: SHIPPED | OUTPATIENT
Start: 2025-08-06 | End: 2025-08-13

## 2025-08-11 ENCOUNTER — OFFICE VISIT (OUTPATIENT)
Dept: ORTHOPEDIC SURGERY | Age: 70
End: 2025-08-11

## 2025-08-11 DIAGNOSIS — M17.12 PRIMARY OSTEOARTHRITIS OF LEFT KNEE: ICD-10-CM

## 2025-08-11 DIAGNOSIS — M17.11 PRIMARY OSTEOARTHRITIS OF RIGHT KNEE: ICD-10-CM

## 2025-08-11 DIAGNOSIS — M00.9 PYOGENIC ARTHRITIS OF LEFT KNEE JOINT, DUE TO UNSPECIFIED ORGANISM (HCC): Primary | ICD-10-CM

## 2025-08-11 PROCEDURE — 99024 POSTOP FOLLOW-UP VISIT: CPT | Performed by: ORTHOPAEDIC SURGERY

## 2025-08-11 RX ORDER — OXYCODONE AND ACETAMINOPHEN 10; 325 MG/1; MG/1
1 TABLET ORAL EVERY 8 HOURS PRN
Qty: 21 TABLET | Refills: 0 | Status: SHIPPED | OUTPATIENT
Start: 2025-08-11 | End: 2025-08-18

## 2025-08-11 RX ORDER — METHYLPREDNISOLONE 4 MG/1
TABLET ORAL
Qty: 1 KIT | Refills: 0 | Status: SHIPPED | OUTPATIENT
Start: 2025-08-11 | End: 2025-08-17

## 2025-08-19 ENCOUNTER — TELEPHONE (OUTPATIENT)
Dept: ORTHOPEDIC SURGERY | Age: 70
End: 2025-08-19

## 2025-08-19 DIAGNOSIS — M00.9 PYOGENIC ARTHRITIS OF LEFT KNEE JOINT, DUE TO UNSPECIFIED ORGANISM (HCC): ICD-10-CM

## 2025-08-19 RX ORDER — OXYCODONE AND ACETAMINOPHEN 10; 325 MG/1; MG/1
1 TABLET ORAL EVERY 8 HOURS PRN
Qty: 21 TABLET | Refills: 0 | Status: SHIPPED | OUTPATIENT
Start: 2025-08-19 | End: 2025-08-26

## 2025-08-26 DIAGNOSIS — M00.9 PYOGENIC ARTHRITIS OF LEFT KNEE JOINT, DUE TO UNSPECIFIED ORGANISM (HCC): ICD-10-CM

## 2025-08-26 RX ORDER — OXYCODONE AND ACETAMINOPHEN 10; 325 MG/1; MG/1
1 TABLET ORAL 2 TIMES DAILY PRN
Qty: 14 TABLET | Refills: 0 | Status: SHIPPED | OUTPATIENT
Start: 2025-08-26 | End: 2025-09-02

## 2025-09-02 DIAGNOSIS — M00.9 PYOGENIC ARTHRITIS OF LEFT KNEE JOINT, DUE TO UNSPECIFIED ORGANISM (HCC): ICD-10-CM

## 2025-09-02 RX ORDER — OXYCODONE AND ACETAMINOPHEN 10; 325 MG/1; MG/1
1 TABLET ORAL 2 TIMES DAILY PRN
Qty: 14 TABLET | Refills: 0 | Status: SHIPPED | OUTPATIENT
Start: 2025-09-02 | End: 2025-09-09

## (undated) DEVICE — SET INSTRUMENT LAP I

## (undated) DEVICE — MARKER,SKIN,WI/RULER AND LABELS: Brand: MEDLINE

## (undated) DEVICE — SPONGE LAP W18XL18IN WHT COT 4 PLY FLD STRUNG RADPQ DISP ST

## (undated) DEVICE — BANDAGE,GAUZE,BULKEE II,4.5"X4.1YD,STRL: Brand: MEDLINE

## (undated) DEVICE — GLOVE SURG SZ 85 L12IN FNGR THK94MIL STD WHT LTX FREE

## (undated) DEVICE — PAD,ABDOMINAL,8"X10",ST,LF: Brand: MEDLINE

## (undated) DEVICE — TIP COVER ACCESSORY

## (undated) DEVICE — 12 ML SYRINGE,LUER-LOCK TIP: Brand: MONOJECT

## (undated) DEVICE — NEEDLE LAP VERES 14 GAX120 MM M0305] COOPER SURGICAL]

## (undated) DEVICE — PRECISION THIN (9.0 X 0.38 X 25.0MM)

## (undated) DEVICE — PAD PT POS 36 IN SURGYPAD DISP

## (undated) DEVICE — SET INSTRUMENT LAP II

## (undated) DEVICE — BANDAGE COMPR M W6INXL10YD WHT BGE VELC E MTRX HK AND LOOP

## (undated) DEVICE — TOWEL OR BLUEE 16X26IN ST 8 PACK ORB08 16X26ORTWL

## (undated) DEVICE — SOLUTION IV IRRIG WATER 1000ML POUR BRL 2F7114

## (undated) DEVICE — GOWN,SIRUS,FABRNF,XL,20/CS: Brand: MEDLINE

## (undated) DEVICE — SET INST DAVINCI ACCESSORIES

## (undated) DEVICE — SYRINGE 20ML LL S/C 50

## (undated) DEVICE — ELECTROSURGICAL PENCIL BUTTON SWITCH NON COATED BLADE ELECTRODE 10 FT (3 M) CORD HOLSTER: Brand: MEGADYNE

## (undated) DEVICE — PADDING,UNDERCAST,COTTON, 4"X4YD STERILE: Brand: MEDLINE

## (undated) DEVICE — SOL IRR SOD CHL 0.9% TITAN XL CNTNR 3000ML

## (undated) DEVICE — DOUBLE BASIN SET: Brand: MEDLINE INDUSTRIES, INC.

## (undated) DEVICE — Z CONVERTED USE 2275207 CLOTH PREP W7.5XL7.5IN 2% CHG SKIN ALC AND RNS FREE

## (undated) DEVICE — BANDAGE COMPR W4INXL10YD WHITE/BEIGE E MTRX HK LOOP CLSR

## (undated) DEVICE — NEEDLE HYPO 25GA L1.5IN BLU POLYPR HUB S STL REG BVL STR

## (undated) DEVICE — SEAL

## (undated) DEVICE — GAUZE,SPONGE,4"X4",16PLY,STRL,LF,10/TRAY: Brand: MEDLINE

## (undated) DEVICE — SET ENDO INSTR LAPAROSCOPIC STGENLAP

## (undated) DEVICE — INTENDED FOR TISSUE SEPARATION, AND OTHER PROCEDURES THAT REQUIRE A SHARP SURGICAL BLADE TO PUNCTURE OR CUT.: Brand: BARD-PARKER ® STAINLESS STEEL BLADES

## (undated) DEVICE — TOTAL TRAY, 16FR 10ML SIL FOLEY, URN: Brand: MEDLINE

## (undated) DEVICE — CANNULA SEAL

## (undated) DEVICE — TROCAR: Brand: KII FIOS FIRST ENTRY

## (undated) DEVICE — BANDAGE COMPR FOAM 5 YDX6 IN TAN STRL COFLX

## (undated) DEVICE — TUBING, SUCTION, 1/4" X 12', STRAIGHT: Brand: MEDLINE

## (undated) DEVICE — 3M™ STERI-DRAPE™ U-DRAPE 1015: Brand: STERI-DRAPE™

## (undated) DEVICE — TIBURON EXTREMITY SHEET: Brand: CONVERTORS

## (undated) DEVICE — APPLICATOR MEDICATED 26 CC SOLUTION HI LT ORNG CHLORAPREP

## (undated) DEVICE — BLADELESS OBTURATOR: Brand: WECK VISTA

## (undated) DEVICE — HYPODERMIC SAFETY NEEDLE: Brand: MAGELLAN

## (undated) DEVICE — CLOTH SURG PREP PREOPERATIVE CHLORHEXIDINE GLUC 2% READYPREP

## (undated) DEVICE — TUBING, SUCTION, 1/4" X 10', STRAIGHT: Brand: MEDLINE

## (undated) DEVICE — SET INST DAVINCI LAP

## (undated) DEVICE — SURGICAL PROCEDURE PACK ROBOTIC

## (undated) DEVICE — SCOPE DAVINCI XI 30 DEG W/CORD

## (undated) DEVICE — TUBING PMP L16FT MAIN DISP FOR AR-6400 AR-6475 Â€“ ORDER MULTIPLES OF 10 EACH

## (undated) DEVICE — BLADE CLIPPER GEN PURP NS

## (undated) DEVICE — SOLUTION IV IRRIG POUR BRL 0.9% SODIUM CHL 2F7124

## (undated) DEVICE — SYRINGE BLB 50CC IRRIG PLIABLE FNGR FLNG GRAD FLSK DISP

## (undated) DEVICE — BLADE,STAINLESS-STEEL,11,STRL,DISPOSABLE: Brand: MEDLINE

## (undated) DEVICE — GENERAL SCOPES AND LIGHT CORD 5 AND 10MM

## (undated) DEVICE — SURGICAL PROCEDURE PACK BASIC

## (undated) DEVICE — NEEDLE CLOSURE OMNICLOSE

## (undated) DEVICE — BLADE SAW LONG WIDE 34.5X16.5X.38MM

## (undated) DEVICE — ZIMMER® STERILE DISPOSABLE TOURNIQUET CUFF WITH PROTECTIVE SLEEVE AND PLC, DUAL PORT, SINGLE BLADDER, 30 IN. (76 CM)

## (undated) DEVICE — 4-PORT MANIFOLD: Brand: NEPTUNE 2

## (undated) DEVICE — ADHESIVE SKIN CLOSURE TOP 36 CC HI VISC DERMBND MINI

## (undated) DEVICE — GLOVE ORTHO 8   MSG9480

## (undated) DEVICE — COLUMN DRAPE

## (undated) DEVICE — SYRINGE MED 10ML LUERLOCK TIP W/O SFTY DISP

## (undated) DEVICE — GAUZE,SPONGE,4"X4",16PLY,XRAY,STRL,LF: Brand: MEDLINE

## (undated) DEVICE — DRESSING HYDROFIBER AQUACEL AG ADVANTAGE 3.5X12 IN

## (undated) DEVICE — 3M™ STERI-STRIP™ REINFORCED ADHESIVE SKIN CLOSURES, R1541, 1/4 IN X 3 IN (6 MM X 75 MM), 3 STRIPS/ENVELOPE: Brand: 3M™ STERI-STRIP™

## (undated) DEVICE — FOOT SWITCH DRAPE: Brand: UNBRANDED

## (undated) DEVICE — DRESSING PETRO W3XL8IN OIL EMUL N ADH GZ KNIT IMPREG CELOS

## (undated) DEVICE — NEEDLE SPNL L3.5IN PNK HUB S STL REG WALL FIT STYL W/ QNCKE

## (undated) DEVICE — TOWEL,OR,DSP,ST,BLUE,STD,6/PK,12PK/CS: Brand: MEDLINE

## (undated) DEVICE — MEDI-VAC NON-CONDUCTIVE SUCTION TUBING: Brand: CARDINAL HEALTH

## (undated) DEVICE — ARM DRAPE

## (undated) DEVICE — SHEET,DRAPE,40X58,STERILE: Brand: MEDLINE

## (undated) DEVICE — TIBURON GENERAL ENDOSCOPY DRAPE: Brand: CONVERTORS

## (undated) DEVICE — PACK,AURORA,LAVH: Brand: MEDLINE

## (undated) DEVICE — PATIENT RETURN ELECTRODE, SINGLE-USE, CONTACT QUALITY MONITORING, ADULT, WITH 9FT CORD, FOR PATIENTS WEIGING OVER 33LBS. (15KG): Brand: MEGADYNE

## (undated) DEVICE — DRAPE,EXTREMITY,89X128,STERILE: Brand: MEDLINE

## (undated) DEVICE — STAPLER INT DIA5MM 25 ABSRB STRP FIX DISP FOR HERN MESH

## (undated) DEVICE — WIPES SKIN CLOTH READYPREP 9 X 10.5 IN 2% CHLORHEX GLUCONATE CHG PREOP

## (undated) DEVICE — MASTISOL ADHESIVE LIQ 2/3ML

## (undated) DEVICE — GLOVE ORANGE PI 7 1/2   MSG9075

## (undated) DEVICE — BASIC SINGLE BASIN 1-LF: Brand: MEDLINE INDUSTRIES, INC.

## (undated) DEVICE — GOWN,SIRUS,POLYRNF,RAGLAN,XL,ST,30/CS: Brand: MEDLINE

## (undated) DEVICE — GLOVE,SURG,SENSICARE,ALOE,LF,PF,7: Brand: MEDLINE

## (undated) DEVICE — Z INACTIVE USE 2660664 SOLUTION IRRIG 3000ML 0.9% SOD CHL USP UROMATIC PLAS CONT

## (undated) DEVICE — SCISSORS SURG DIA8MM MPLR CRV ENDOWRIST

## (undated) DEVICE — PEN: MARKING STD 100/CS: Brand: MEDICAL ACTION INDUSTRIES

## (undated) DEVICE — GLOVE ORANGE PI 8   MSG9080

## (undated) DEVICE — NEEDLE HYPO 18GA L1.5IN PNK POLYPR HUB S STL THN WALL FILL

## (undated) DEVICE — REDUCER: Brand: ENDOWRIST

## (undated) DEVICE — POUCH, INSTRUMENT, 3POCKET, INVISISHIELD: Brand: MEDLINE

## (undated) DEVICE — GARMENT,MEDLINE,DVT,INT,CALF,MED, GEN2: Brand: MEDLINE

## (undated) DEVICE — COVER,LIGHT HANDLE,FLX,1/PK: Brand: MEDLINE INDUSTRIES, INC.

## (undated) DEVICE — NEEDLE HYPO 21GA L1.5IN GRN POLYPR HUB S STL REG BVL STR

## (undated) DEVICE — GLOVE SURG SZ 8 L12IN FNGR THK79MIL GRN LTX FREE

## (undated) DEVICE — GOWN,SIRUS,NON REINFRCD,LARGE,SET IN SL: Brand: MEDLINE

## (undated) DEVICE — 1810 FOAM BLOCK NEEDLE COUNTER: Brand: DEVON

## (undated) DEVICE — BANDAGE COMPR W6INXL12FT SMOOTH FOR LIMB EXSANG ESMARCH

## (undated) DEVICE — NDL CNTR 40CT FM MAG: Brand: MEDLINE INDUSTRIES, INC.

## (undated) DEVICE — PACK,EXTREMITY,ORTHOMAX,5/CS: Brand: MEDLINE

## (undated) DEVICE — CHLORAPREP 26ML ORANGE

## (undated) DEVICE — PUMP SUC IRR TBNG L10FT W/ HNDPC ASSEMB STRYKEFLOW 2

## (undated) DEVICE — DRESSING TRNSPAR W4XL55IN ACRYL SUP FLM W ADH WTRPRF OPSITE

## (undated) DEVICE — BASIC DOUBLE BASIN 2-LF: Brand: MEDLINE INDUSTRIES, INC.

## (undated) DEVICE — LARGE NEEDLE DRIVER: Brand: ENDOWRIST